# Patient Record
Sex: FEMALE | Race: WHITE | NOT HISPANIC OR LATINO | Employment: OTHER | URBAN - METROPOLITAN AREA
[De-identification: names, ages, dates, MRNs, and addresses within clinical notes are randomized per-mention and may not be internally consistent; named-entity substitution may affect disease eponyms.]

---

## 2024-04-17 ENCOUNTER — HOSPITAL ENCOUNTER (INPATIENT)
Facility: HOSPITAL | Age: 89
LOS: 2 days | Discharge: STILL ADMITTED OR ADMINISTRATIVE | DRG: 242 | End: 2024-04-19
Attending: EMERGENCY MEDICINE | Admitting: INTERNAL MEDICINE
Payer: MEDICARE

## 2024-04-17 ENCOUNTER — APPOINTMENT (OUTPATIENT)
Dept: CARDIOLOGY | Facility: HOSPITAL | Age: 89
DRG: 242 | End: 2024-04-17
Payer: MEDICARE

## 2024-04-17 ENCOUNTER — APPOINTMENT (OUTPATIENT)
Dept: RADIOLOGY | Facility: HOSPITAL | Age: 89
DRG: 242 | End: 2024-04-17
Payer: MEDICARE

## 2024-04-17 DIAGNOSIS — I38 SYSTOLIC CONGESTIVE HEART FAILURE DUE TO VALVULAR DISEASE (MULTI): ICD-10-CM

## 2024-04-17 DIAGNOSIS — I50.9 ACUTE CONGESTIVE HEART FAILURE, UNSPECIFIED HEART FAILURE TYPE (MULTI): ICD-10-CM

## 2024-04-17 DIAGNOSIS — I48.91 ATRIAL FIBRILLATION WITH SLOW VENTRICULAR RESPONSE (MULTI): ICD-10-CM

## 2024-04-17 DIAGNOSIS — I50.20 SYSTOLIC CONGESTIVE HEART FAILURE DUE TO VALVULAR DISEASE (MULTI): ICD-10-CM

## 2024-04-17 DIAGNOSIS — R00.1 BRADYCARDIA: Primary | ICD-10-CM

## 2024-04-17 DIAGNOSIS — I10 HYPERTENSION, UNSPECIFIED TYPE: ICD-10-CM

## 2024-04-17 LAB
ALBUMIN SERPL-MCNC: 4 G/DL (ref 3.5–5)
ALP BLD-CCNC: 80 U/L (ref 35–125)
ALT SERPL-CCNC: 20 U/L (ref 5–40)
ANION GAP SERPL CALC-SCNC: 10 MMOL/L
AORTIC VALVE MEAN GRADIENT: 5 MMHG
AORTIC VALVE PEAK VELOCITY: 1.62 M/S
APPEARANCE UR: CLEAR
APTT PPP: 34.2 SECONDS (ref 22–32.5)
AST SERPL-CCNC: 29 U/L (ref 5–40)
AV PEAK GRADIENT: 10.5 MMHG
AVA (PEAK VEL): 1.98 CM2
AVA (VTI): 2.19 CM2
BACTERIA #/AREA URNS AUTO: ABNORMAL /HPF
BASOPHILS # BLD AUTO: 0.05 X10*3/UL (ref 0–0.1)
BASOPHILS NFR BLD AUTO: 0.6 %
BILIRUB DIRECT SERPL-MCNC: 0.3 MG/DL (ref 0–0.2)
BILIRUB SERPL-MCNC: 0.9 MG/DL (ref 0.1–1.2)
BILIRUB UR STRIP.AUTO-MCNC: NEGATIVE MG/DL
BUN SERPL-MCNC: 26 MG/DL (ref 8–25)
CALCIUM SERPL-MCNC: 9.3 MG/DL (ref 8.5–10.4)
CHLORIDE SERPL-SCNC: 106 MMOL/L (ref 97–107)
CO2 SERPL-SCNC: 25 MMOL/L (ref 24–31)
COLOR UR: ABNORMAL
CREAT SERPL-MCNC: 1.1 MG/DL (ref 0.4–1.6)
EGFRCR SERPLBLD CKD-EPI 2021: 47 ML/MIN/1.73M*2
EJECTION FRACTION APICAL 4 CHAMBER: 56.2
EOSINOPHIL # BLD AUTO: 0.18 X10*3/UL (ref 0–0.4)
EOSINOPHIL NFR BLD AUTO: 2.2 %
ERYTHROCYTE [DISTWIDTH] IN BLOOD BY AUTOMATED COUNT: 14.6 % (ref 11.5–14.5)
GLUCOSE BLD MANUAL STRIP-MCNC: 114 MG/DL (ref 74–99)
GLUCOSE BLD MANUAL STRIP-MCNC: 119 MG/DL (ref 74–99)
GLUCOSE BLD MANUAL STRIP-MCNC: 97 MG/DL (ref 74–99)
GLUCOSE SERPL-MCNC: 103 MG/DL (ref 65–99)
GLUCOSE UR STRIP.AUTO-MCNC: NORMAL MG/DL
HCT VFR BLD AUTO: 37.9 % (ref 36–46)
HGB BLD-MCNC: 12.4 G/DL (ref 12–16)
IMM GRANULOCYTES # BLD AUTO: 0.02 X10*3/UL (ref 0–0.5)
IMM GRANULOCYTES NFR BLD AUTO: 0.2 % (ref 0–0.9)
INR PPP: 2.2 (ref 0.9–1.2)
KETONES UR STRIP.AUTO-MCNC: NEGATIVE MG/DL
LEFT ATRIUM VOLUME AREA LENGTH INDEX BSA: 100.1 ML/M2
LEFT VENTRICLE INTERNAL DIMENSION DIASTOLE: 5.67 CM (ref 3.5–6)
LEFT VENTRICULAR OUTFLOW TRACT DIAMETER: 2 CM
LEUKOCYTE ESTERASE UR QL STRIP.AUTO: ABNORMAL
LYMPHOCYTES # BLD AUTO: 2.58 X10*3/UL (ref 0.8–3)
LYMPHOCYTES NFR BLD AUTO: 31.8 %
MAGNESIUM SERPL-MCNC: 1.7 MG/DL (ref 1.6–3.1)
MCH RBC QN AUTO: 31.6 PG (ref 26–34)
MCHC RBC AUTO-ENTMCNC: 32.7 G/DL (ref 32–36)
MCV RBC AUTO: 97 FL (ref 80–100)
MONOCYTES # BLD AUTO: 0.71 X10*3/UL (ref 0.05–0.8)
MONOCYTES NFR BLD AUTO: 8.8 %
MUCOUS THREADS #/AREA URNS AUTO: ABNORMAL /LPF
NEUTROPHILS # BLD AUTO: 4.57 X10*3/UL (ref 1.6–5.5)
NEUTROPHILS NFR BLD AUTO: 56.4 %
NITRITE UR QL STRIP.AUTO: NEGATIVE
NRBC BLD-RTO: 0 /100 WBCS (ref 0–0)
NT-PROBNP SERPL-MCNC: 2806 PG/ML (ref 0–624)
NT-PROBNP SERPL-MCNC: 2813 PG/ML (ref 0–624)
PH UR STRIP.AUTO: 6 [PH]
PLATELET # BLD AUTO: 186 X10*3/UL (ref 150–450)
POTASSIUM SERPL-SCNC: 3.9 MMOL/L (ref 3.4–5.1)
PROT SERPL-MCNC: 7.3 G/DL (ref 5.9–7.9)
PROT UR STRIP.AUTO-MCNC: ABNORMAL MG/DL
PROTHROMBIN TIME: 21.8 SECONDS (ref 9.3–12.7)
RBC # BLD AUTO: 3.92 X10*6/UL (ref 4–5.2)
RBC # UR STRIP.AUTO: NEGATIVE /UL
RBC #/AREA URNS AUTO: ABNORMAL /HPF
RIGHT VENTRICLE FREE WALL PEAK S': 7.18 CM/S
RIGHT VENTRICLE PEAK SYSTOLIC PRESSURE: 54.6 MMHG
SODIUM SERPL-SCNC: 141 MMOL/L (ref 133–145)
SP GR UR STRIP.AUTO: 1.01
SQUAMOUS #/AREA URNS AUTO: ABNORMAL /HPF
TRICUSPID ANNULAR PLANE SYSTOLIC EXCURSION: 2.1 CM
TROPONIN T SERPL-MCNC: 24 NG/L
TROPONIN T SERPL-MCNC: 24 NG/L
TROPONIN T SERPL-MCNC: 29 NG/L
TSH SERPL DL<=0.05 MIU/L-ACNC: 1.57 MIU/L (ref 0.27–4.2)
UROBILINOGEN UR STRIP.AUTO-MCNC: NORMAL MG/DL
WBC # BLD AUTO: 8.1 X10*3/UL (ref 4.4–11.3)
WBC #/AREA URNS AUTO: ABNORMAL /HPF

## 2024-04-17 PROCEDURE — 36415 COLL VENOUS BLD VENIPUNCTURE: CPT | Performed by: INTERNAL MEDICINE

## 2024-04-17 PROCEDURE — 2500000001 HC RX 250 WO HCPCS SELF ADMINISTERED DRUGS (ALT 637 FOR MEDICARE OP): Performed by: EMERGENCY MEDICINE

## 2024-04-17 PROCEDURE — 71045 X-RAY EXAM CHEST 1 VIEW: CPT

## 2024-04-17 PROCEDURE — 81001 URINALYSIS AUTO W/SCOPE: CPT | Performed by: EMERGENCY MEDICINE

## 2024-04-17 PROCEDURE — 2500000004 HC RX 250 GENERAL PHARMACY W/ HCPCS (ALT 636 FOR OP/ED): Performed by: EMERGENCY MEDICINE

## 2024-04-17 PROCEDURE — 99291 CRITICAL CARE FIRST HOUR: CPT

## 2024-04-17 PROCEDURE — 96374 THER/PROPH/DIAG INJ IV PUSH: CPT

## 2024-04-17 PROCEDURE — 83735 ASSAY OF MAGNESIUM: CPT | Performed by: EMERGENCY MEDICINE

## 2024-04-17 PROCEDURE — 2500000006 HC RX 250 W HCPCS SELF ADMINISTERED DRUGS (ALT 637 FOR ALL PAYERS): Performed by: INTERNAL MEDICINE

## 2024-04-17 PROCEDURE — 2500000006 HC RX 250 W HCPCS SELF ADMINISTERED DRUGS (ALT 637 FOR ALL PAYERS): Mod: MUE | Performed by: INTERNAL MEDICINE

## 2024-04-17 PROCEDURE — 93005 ELECTROCARDIOGRAM TRACING: CPT

## 2024-04-17 PROCEDURE — 85025 COMPLETE CBC W/AUTO DIFF WBC: CPT | Performed by: EMERGENCY MEDICINE

## 2024-04-17 PROCEDURE — 87086 URINE CULTURE/COLONY COUNT: CPT | Mod: TRILAB,WESLAB,91 | Performed by: INTERNAL MEDICINE

## 2024-04-17 PROCEDURE — 2500000001 HC RX 250 WO HCPCS SELF ADMINISTERED DRUGS (ALT 637 FOR MEDICARE OP): Performed by: INTERNAL MEDICINE

## 2024-04-17 PROCEDURE — 36415 COLL VENOUS BLD VENIPUNCTURE: CPT | Performed by: EMERGENCY MEDICINE

## 2024-04-17 PROCEDURE — 2500000004 HC RX 250 GENERAL PHARMACY W/ HCPCS (ALT 636 FOR OP/ED): Performed by: INTERNAL MEDICINE

## 2024-04-17 PROCEDURE — 2500000001 HC RX 250 WO HCPCS SELF ADMINISTERED DRUGS (ALT 637 FOR MEDICARE OP)

## 2024-04-17 PROCEDURE — 84484 ASSAY OF TROPONIN QUANT: CPT | Performed by: EMERGENCY MEDICINE

## 2024-04-17 PROCEDURE — 83880 ASSAY OF NATRIURETIC PEPTIDE: CPT | Mod: 91 | Performed by: INTERNAL MEDICINE

## 2024-04-17 PROCEDURE — 80053 COMPREHEN METABOLIC PANEL: CPT | Performed by: EMERGENCY MEDICINE

## 2024-04-17 PROCEDURE — 83880 ASSAY OF NATRIURETIC PEPTIDE: CPT | Performed by: EMERGENCY MEDICINE

## 2024-04-17 PROCEDURE — 87086 URINE CULTURE/COLONY COUNT: CPT | Mod: TRILAB,WESLAB | Performed by: EMERGENCY MEDICINE

## 2024-04-17 PROCEDURE — 85610 PROTHROMBIN TIME: CPT | Performed by: EMERGENCY MEDICINE

## 2024-04-17 PROCEDURE — 82248 BILIRUBIN DIRECT: CPT | Performed by: EMERGENCY MEDICINE

## 2024-04-17 PROCEDURE — 85730 THROMBOPLASTIN TIME PARTIAL: CPT | Performed by: EMERGENCY MEDICINE

## 2024-04-17 PROCEDURE — 2060000001 HC INTERMEDIATE ICU ROOM DAILY

## 2024-04-17 PROCEDURE — 96375 TX/PRO/DX INJ NEW DRUG ADDON: CPT

## 2024-04-17 PROCEDURE — 93306 TTE W/DOPPLER COMPLETE: CPT

## 2024-04-17 PROCEDURE — 84443 ASSAY THYROID STIM HORMONE: CPT | Performed by: EMERGENCY MEDICINE

## 2024-04-17 PROCEDURE — 82947 ASSAY GLUCOSE BLOOD QUANT: CPT | Mod: 91

## 2024-04-17 PROCEDURE — 71045 X-RAY EXAM CHEST 1 VIEW: CPT | Performed by: STUDENT IN AN ORGANIZED HEALTH CARE EDUCATION/TRAINING PROGRAM

## 2024-04-17 PROCEDURE — 97162 PT EVAL MOD COMPLEX 30 MIN: CPT | Mod: GP

## 2024-04-17 PROCEDURE — 97165 OT EVAL LOW COMPLEX 30 MIN: CPT | Mod: GO

## 2024-04-17 RX ORDER — LOSARTAN POTASSIUM 50 MG/1
50 TABLET ORAL ONCE
Status: COMPLETED | OUTPATIENT
Start: 2024-04-17 | End: 2024-04-17

## 2024-04-17 RX ORDER — METFORMIN HYDROCHLORIDE 500 MG/1
500 TABLET ORAL
COMMUNITY
End: 2024-04-19 | Stop reason: HOSPADM

## 2024-04-17 RX ORDER — CEFTRIAXONE 1 G/50ML
1 INJECTION, SOLUTION INTRAVENOUS EVERY 24 HOURS
Status: DISCONTINUED | OUTPATIENT
Start: 2024-04-17 | End: 2024-04-19 | Stop reason: HOSPADM

## 2024-04-17 RX ORDER — FERROUS SULFATE, DRIED 160(50) MG
1 TABLET, EXTENDED RELEASE ORAL DAILY
Status: DISCONTINUED | OUTPATIENT
Start: 2024-04-17 | End: 2024-04-18

## 2024-04-17 RX ORDER — ALLOPURINOL 100 MG/1
100 TABLET ORAL DAILY
COMMUNITY

## 2024-04-17 RX ORDER — METOPROLOL SUCCINATE 100 MG/1
100 TABLET, EXTENDED RELEASE ORAL DAILY
COMMUNITY
End: 2024-04-19 | Stop reason: HOSPADM

## 2024-04-17 RX ORDER — ACETAMINOPHEN 325 MG/1
650 TABLET ORAL EVERY 4 HOURS PRN
Status: DISCONTINUED | OUTPATIENT
Start: 2024-04-17 | End: 2024-04-19 | Stop reason: HOSPADM

## 2024-04-17 RX ORDER — TALC
3 POWDER (GRAM) TOPICAL NIGHTLY PRN
Status: DISCONTINUED | OUTPATIENT
Start: 2024-04-17 | End: 2024-04-19 | Stop reason: HOSPADM

## 2024-04-17 RX ORDER — PANTOPRAZOLE SODIUM 40 MG/10ML
40 INJECTION, POWDER, LYOPHILIZED, FOR SOLUTION INTRAVENOUS
Status: DISCONTINUED | OUTPATIENT
Start: 2024-04-17 | End: 2024-04-19 | Stop reason: HOSPADM

## 2024-04-17 RX ORDER — POLYETHYLENE GLYCOL 3350 17 G/17G
17 POWDER, FOR SOLUTION ORAL DAILY
Status: DISCONTINUED | OUTPATIENT
Start: 2024-04-17 | End: 2024-04-18

## 2024-04-17 RX ORDER — WARFARIN SODIUM 5 MG/1
5 TABLET ORAL DAILY
Status: DISCONTINUED | OUTPATIENT
Start: 2024-04-17 | End: 2024-04-19 | Stop reason: HOSPADM

## 2024-04-17 RX ORDER — ACETAMINOPHEN 160 MG/5ML
650 SOLUTION ORAL EVERY 4 HOURS PRN
Status: DISCONTINUED | OUTPATIENT
Start: 2024-04-17 | End: 2024-04-19 | Stop reason: HOSPADM

## 2024-04-17 RX ORDER — DEXTROSE 50 % IN WATER (D50W) INTRAVENOUS SYRINGE
12.5
Status: DISCONTINUED | OUTPATIENT
Start: 2024-04-17 | End: 2024-04-19 | Stop reason: HOSPADM

## 2024-04-17 RX ORDER — ALLOPURINOL 100 MG/1
100 TABLET ORAL DAILY
Status: DISCONTINUED | OUTPATIENT
Start: 2024-04-17 | End: 2024-04-17

## 2024-04-17 RX ORDER — WARFARIN SODIUM 5 MG/1
5 TABLET ORAL
COMMUNITY
End: 2024-04-19 | Stop reason: HOSPADM

## 2024-04-17 RX ORDER — DEXTROSE 50 % IN WATER (D50W) INTRAVENOUS SYRINGE
25
Status: DISCONTINUED | OUTPATIENT
Start: 2024-04-17 | End: 2024-04-19 | Stop reason: HOSPADM

## 2024-04-17 RX ORDER — PANTOPRAZOLE SODIUM 40 MG/1
40 TABLET, DELAYED RELEASE ORAL
Status: DISCONTINUED | OUTPATIENT
Start: 2024-04-17 | End: 2024-04-19 | Stop reason: HOSPADM

## 2024-04-17 RX ORDER — INSULIN LISPRO 100 [IU]/ML
0-10 INJECTION, SOLUTION INTRAVENOUS; SUBCUTANEOUS
Status: DISCONTINUED | OUTPATIENT
Start: 2024-04-17 | End: 2024-04-19 | Stop reason: HOSPADM

## 2024-04-17 RX ORDER — FERROUS SULFATE, DRIED 160(50) MG
1 TABLET, EXTENDED RELEASE ORAL DAILY
COMMUNITY

## 2024-04-17 RX ORDER — POTASSIUM CHLORIDE 20 MEQ/1
20 TABLET, EXTENDED RELEASE ORAL ONCE
Status: COMPLETED | OUTPATIENT
Start: 2024-04-17 | End: 2024-04-17

## 2024-04-17 RX ORDER — ATROPINE SULFATE 0.1 MG/ML
0.5 INJECTION INTRAVENOUS ONCE
Status: DISCONTINUED | OUTPATIENT
Start: 2024-04-17 | End: 2024-04-19 | Stop reason: HOSPADM

## 2024-04-17 RX ORDER — ALLOPURINOL 100 MG/1
100 TABLET ORAL DAILY
Status: DISCONTINUED | OUTPATIENT
Start: 2024-04-17 | End: 2024-04-19 | Stop reason: HOSPADM

## 2024-04-17 RX ORDER — FUROSEMIDE 10 MG/ML
20 INJECTION INTRAMUSCULAR; INTRAVENOUS ONCE
Status: COMPLETED | OUTPATIENT
Start: 2024-04-17 | End: 2024-04-17

## 2024-04-17 RX ORDER — ATROPINE SULFATE 0.4 MG/ML
1 INJECTION, SOLUTION ENDOTRACHEAL; INTRAMEDULLARY; INTRAMUSCULAR; INTRAVENOUS; SUBCUTANEOUS ONCE
Status: COMPLETED | OUTPATIENT
Start: 2024-04-17 | End: 2024-04-17

## 2024-04-17 RX ORDER — LOSARTAN POTASSIUM AND HYDROCHLOROTHIAZIDE 12.5; 5 MG/1; MG/1
1 TABLET ORAL DAILY
COMMUNITY
End: 2024-04-19 | Stop reason: HOSPADM

## 2024-04-17 RX ORDER — ACETAMINOPHEN 650 MG/1
650 SUPPOSITORY RECTAL EVERY 4 HOURS PRN
Status: DISCONTINUED | OUTPATIENT
Start: 2024-04-17 | End: 2024-04-19 | Stop reason: HOSPADM

## 2024-04-17 RX ORDER — ACETAMINOPHEN 325 MG/1
975 TABLET ORAL ONCE
Status: COMPLETED | OUTPATIENT
Start: 2024-04-17 | End: 2024-04-17

## 2024-04-17 RX ORDER — LOSARTAN POTASSIUM 50 MG/1
50 TABLET ORAL DAILY
Status: DISCONTINUED | OUTPATIENT
Start: 2024-04-18 | End: 2024-04-19 | Stop reason: HOSPADM

## 2024-04-17 RX ORDER — MAGNESIUM SULFATE HEPTAHYDRATE 40 MG/ML
2 INJECTION, SOLUTION INTRAVENOUS ONCE
Status: COMPLETED | OUTPATIENT
Start: 2024-04-17 | End: 2024-04-17

## 2024-04-17 RX ORDER — FUROSEMIDE 20 MG/1
20 TABLET ORAL ONCE
Status: COMPLETED | OUTPATIENT
Start: 2024-04-17 | End: 2024-04-17

## 2024-04-17 RX ORDER — LOSARTAN POTASSIUM AND HYDROCHLOROTHIAZIDE 12.5; 1 MG/1; MG/1
1 TABLET ORAL DAILY
COMMUNITY
End: 2024-04-19 | Stop reason: HOSPADM

## 2024-04-17 RX ORDER — DOPAMINE HYDROCHLORIDE 160 MG/100ML
1 INJECTION, SOLUTION INTRAVENOUS CONTINUOUS
Status: DISCONTINUED | OUTPATIENT
Start: 2024-04-18 | End: 2024-04-19

## 2024-04-17 RX ADMIN — LOSARTAN POTASSIUM 50 MG: 50 TABLET, FILM COATED ORAL at 07:06

## 2024-04-17 RX ADMIN — Medication 3 MG: at 21:28

## 2024-04-17 RX ADMIN — FUROSEMIDE 20 MG: 20 TABLET ORAL at 21:27

## 2024-04-17 RX ADMIN — CEFTRIAXONE SODIUM 1 G: 1 INJECTION, SOLUTION INTRAVENOUS at 14:18

## 2024-04-17 RX ADMIN — POTASSIUM CHLORIDE 20 MEQ: 1500 TABLET, EXTENDED RELEASE ORAL at 21:27

## 2024-04-17 RX ADMIN — WARFARIN SODIUM 5 MG: 5 TABLET ORAL at 21:28

## 2024-04-17 RX ADMIN — ATROPINE SULFATE 1 MG: 0.4 INJECTION INTRAMUSCULAR; INTRAVENOUS; SUBCUTANEOUS at 07:00

## 2024-04-17 RX ADMIN — Medication 1 TABLET: at 10:57

## 2024-04-17 RX ADMIN — FUROSEMIDE 20 MG: 10 INJECTION, SOLUTION INTRAMUSCULAR; INTRAVENOUS at 07:18

## 2024-04-17 RX ADMIN — ACETAMINOPHEN 975 MG: 325 TABLET ORAL at 07:57

## 2024-04-17 RX ADMIN — LOSARTAN POTASSIUM: 100 TABLET, FILM COATED ORAL at 10:56

## 2024-04-17 RX ADMIN — MAGNESIUM SULFATE HEPTAHYDRATE 2 G: 40 INJECTION, SOLUTION INTRAVENOUS at 21:45

## 2024-04-17 RX ADMIN — ACETAMINOPHEN 650 MG: 325 TABLET ORAL at 12:35

## 2024-04-17 RX ADMIN — ALLOPURINOL 100 MG: 100 TABLET ORAL at 21:28

## 2024-04-17 RX ADMIN — DOPAMINE HYDROCHLORIDE 2 MCG/KG/MIN: 160 INJECTION, SOLUTION INTRAVENOUS at 23:53

## 2024-04-17 SDOH — ECONOMIC STABILITY: INCOME INSECURITY: IN THE LAST 12 MONTHS, WAS THERE A TIME WHEN YOU WERE NOT ABLE TO PAY THE MORTGAGE OR RENT ON TIME?: NO

## 2024-04-17 SDOH — SOCIAL STABILITY: SOCIAL NETWORK: HOW OFTEN DO YOU GET TOGETHER WITH FRIENDS OR RELATIVES?: MORE THAN THREE TIMES A WEEK

## 2024-04-17 SDOH — SOCIAL STABILITY: SOCIAL NETWORK: HOW OFTEN DO YOU ATTEND CHURCH OR RELIGIOUS SERVICES?: NEVER

## 2024-04-17 SDOH — SOCIAL STABILITY: SOCIAL INSECURITY: HAVE YOU HAD ANY THOUGHTS OF HARMING ANYONE ELSE?: NO

## 2024-04-17 SDOH — SOCIAL STABILITY: SOCIAL INSECURITY: WERE YOU ABLE TO COMPLETE ALL THE BEHAVIORAL HEALTH SCREENINGS?: YES

## 2024-04-17 SDOH — SOCIAL STABILITY: SOCIAL INSECURITY: WITHIN THE LAST YEAR, HAVE YOU BEEN HUMILIATED OR EMOTIONALLY ABUSED IN OTHER WAYS BY YOUR PARTNER OR EX-PARTNER?: NO

## 2024-04-17 SDOH — HEALTH STABILITY: MENTAL HEALTH: HOW OFTEN DO YOU HAVE A DRINK CONTAINING ALCOHOL?: NEVER

## 2024-04-17 SDOH — SOCIAL STABILITY: SOCIAL INSECURITY
WITHIN THE LAST YEAR, HAVE TO BEEN RAPED OR FORCED TO HAVE ANY KIND OF SEXUAL ACTIVITY BY YOUR PARTNER OR EX-PARTNER?: NO

## 2024-04-17 SDOH — ECONOMIC STABILITY: FOOD INSECURITY: WITHIN THE PAST 12 MONTHS, THE FOOD YOU BOUGHT JUST DIDN'T LAST AND YOU DIDN'T HAVE MONEY TO GET MORE.: NEVER TRUE

## 2024-04-17 SDOH — SOCIAL STABILITY: SOCIAL NETWORK
DO YOU BELONG TO ANY CLUBS OR ORGANIZATIONS SUCH AS CHURCH GROUPS UNIONS, FRATERNAL OR ATHLETIC GROUPS, OR SCHOOL GROUPS?: YES

## 2024-04-17 SDOH — ECONOMIC STABILITY: INCOME INSECURITY: HOW HARD IS IT FOR YOU TO PAY FOR THE VERY BASICS LIKE FOOD, HOUSING, MEDICAL CARE, AND HEATING?: NOT HARD AT ALL

## 2024-04-17 SDOH — HEALTH STABILITY: MENTAL HEALTH: HOW OFTEN DO YOU HAVE 6 OR MORE DRINKS ON ONE OCCASION?: NEVER

## 2024-04-17 SDOH — ECONOMIC STABILITY: HOUSING INSECURITY
IN THE LAST 12 MONTHS, WAS THERE A TIME WHEN YOU DID NOT HAVE A STEADY PLACE TO SLEEP OR SLEPT IN A SHELTER (INCLUDING NOW)?: NO

## 2024-04-17 SDOH — ECONOMIC STABILITY: FOOD INSECURITY: WITHIN THE PAST 12 MONTHS, YOU WORRIED THAT YOUR FOOD WOULD RUN OUT BEFORE YOU GOT MONEY TO BUY MORE.: NEVER TRUE

## 2024-04-17 SDOH — ECONOMIC STABILITY: TRANSPORTATION INSECURITY
IN THE PAST 12 MONTHS, HAS LACK OF TRANSPORTATION KEPT YOU FROM MEETINGS, WORK, OR FROM GETTING THINGS NEEDED FOR DAILY LIVING?: NO

## 2024-04-17 SDOH — SOCIAL STABILITY: SOCIAL INSECURITY: HAS ANYONE EVER THREATENED TO HURT YOUR FAMILY OR YOUR PETS?: NO

## 2024-04-17 SDOH — SOCIAL STABILITY: SOCIAL INSECURITY: DO YOU FEEL ANYONE HAS EXPLOITED OR TAKEN ADVANTAGE OF YOU FINANCIALLY OR OF YOUR PERSONAL PROPERTY?: NO

## 2024-04-17 SDOH — HEALTH STABILITY: PHYSICAL HEALTH: ON AVERAGE, HOW MANY DAYS PER WEEK DO YOU ENGAGE IN MODERATE TO STRENUOUS EXERCISE (LIKE A BRISK WALK)?: 7 DAYS

## 2024-04-17 SDOH — HEALTH STABILITY: MENTAL HEALTH
STRESS IS WHEN SOMEONE FEELS TENSE, NERVOUS, ANXIOUS, OR CAN'T SLEEP AT NIGHT BECAUSE THEIR MIND IS TROUBLED. HOW STRESSED ARE YOU?: NOT AT ALL

## 2024-04-17 SDOH — SOCIAL STABILITY: SOCIAL INSECURITY: WITHIN THE LAST YEAR, HAVE YOU BEEN AFRAID OF YOUR PARTNER OR EX-PARTNER?: NO

## 2024-04-17 SDOH — ECONOMIC STABILITY: INCOME INSECURITY: IN THE PAST 12 MONTHS, HAS THE ELECTRIC, GAS, OIL, OR WATER COMPANY THREATENED TO SHUT OFF SERVICE IN YOUR HOME?: NO

## 2024-04-17 SDOH — SOCIAL STABILITY: SOCIAL INSECURITY: HAVE YOU HAD THOUGHTS OF HARMING ANYONE ELSE?: NO

## 2024-04-17 SDOH — HEALTH STABILITY: MENTAL HEALTH
HOW OFTEN DO YOU NEED TO HAVE SOMEONE HELP YOU WHEN YOU READ INSTRUCTIONS, PAMPHLETS, OR OTHER WRITTEN MATERIAL FROM YOUR DOCTOR OR PHARMACY?: NEVER

## 2024-04-17 SDOH — HEALTH STABILITY: MENTAL HEALTH: HOW MANY STANDARD DRINKS CONTAINING ALCOHOL DO YOU HAVE ON A TYPICAL DAY?: PATIENT DOES NOT DRINK

## 2024-04-17 SDOH — SOCIAL STABILITY: SOCIAL NETWORK: HOW OFTEN DO YOU ATTENT MEETINGS OF THE CLUB OR ORGANIZATION YOU BELONG TO?: MORE THAN 4 TIMES PER YEAR

## 2024-04-17 SDOH — SOCIAL STABILITY: SOCIAL NETWORK
IN A TYPICAL WEEK, HOW MANY TIMES DO YOU TALK ON THE PHONE WITH FAMILY, FRIENDS, OR NEIGHBORS?: MORE THAN THREE TIMES A WEEK

## 2024-04-17 SDOH — SOCIAL STABILITY: SOCIAL INSECURITY: DO YOU FEEL UNSAFE GOING BACK TO THE PLACE WHERE YOU ARE LIVING?: NO

## 2024-04-17 SDOH — SOCIAL STABILITY: SOCIAL INSECURITY
WITHIN THE LAST YEAR, HAVE YOU BEEN KICKED, HIT, SLAPPED, OR OTHERWISE PHYSICALLY HURT BY YOUR PARTNER OR EX-PARTNER?: NO

## 2024-04-17 SDOH — SOCIAL STABILITY: SOCIAL NETWORK: ARE YOU MARRIED, WIDOWED, DIVORCED, SEPARATED, NEVER MARRIED, OR LIVING WITH A PARTNER?: WIDOWED

## 2024-04-17 SDOH — SOCIAL STABILITY: SOCIAL INSECURITY: ABUSE: ADULT

## 2024-04-17 SDOH — SOCIAL STABILITY: SOCIAL INSECURITY: ARE THERE ANY APPARENT SIGNS OF INJURIES/BEHAVIORS THAT COULD BE RELATED TO ABUSE/NEGLECT?: NO

## 2024-04-17 SDOH — ECONOMIC STABILITY: TRANSPORTATION INSECURITY
IN THE PAST 12 MONTHS, HAS THE LACK OF TRANSPORTATION KEPT YOU FROM MEDICAL APPOINTMENTS OR FROM GETTING MEDICATIONS?: NO

## 2024-04-17 SDOH — SOCIAL STABILITY: SOCIAL INSECURITY: ARE YOU OR HAVE YOU BEEN THREATENED OR ABUSED PHYSICALLY, EMOTIONALLY, OR SEXUALLY BY ANYONE?: NO

## 2024-04-17 SDOH — SOCIAL STABILITY: SOCIAL INSECURITY: DOES ANYONE TRY TO KEEP YOU FROM HAVING/CONTACTING OTHER FRIENDS OR DOING THINGS OUTSIDE YOUR HOME?: NO

## 2024-04-17 ASSESSMENT — PAIN - FUNCTIONAL ASSESSMENT
PAIN_FUNCTIONAL_ASSESSMENT: 0-10

## 2024-04-17 ASSESSMENT — COGNITIVE AND FUNCTIONAL STATUS - GENERAL
TOILETING: A LOT
CLIMB 3 TO 5 STEPS WITH RAILING: A LITTLE
STANDING UP FROM CHAIR USING ARMS: A LITTLE
DRESSING REGULAR LOWER BODY CLOTHING: A LOT
MOVING TO AND FROM BED TO CHAIR: TOTAL
EATING MEALS: A LITTLE
TOILETING: A LOT
DRESSING REGULAR LOWER BODY CLOTHING: A LOT
MOBILITY SCORE: 10
DRESSING REGULAR UPPER BODY CLOTHING: A LITTLE
STANDING UP FROM CHAIR USING ARMS: TOTAL
MOVING FROM LYING ON BACK TO SITTING ON SIDE OF FLAT BED WITH BEDRAILS: A LITTLE
DAILY ACTIVITIY SCORE: 24
WALKING IN HOSPITAL ROOM: TOTAL
TURNING FROM BACK TO SIDE WHILE IN FLAT BAD: A LITTLE
EATING MEALS: A LITTLE
WALKING IN HOSPITAL ROOM: TOTAL
CLIMB 3 TO 5 STEPS WITH RAILING: TOTAL
PATIENT BASELINE BEDBOUND: NO
WALKING IN HOSPITAL ROOM: A LITTLE
TURNING FROM BACK TO SIDE WHILE IN FLAT BAD: A LITTLE
DAILY ACTIVITIY SCORE: 15
CLIMB 3 TO 5 STEPS WITH RAILING: TOTAL
MOVING TO AND FROM BED TO CHAIR: TOTAL
DAILY ACTIVITIY SCORE: 15
MOBILITY SCORE: 21
PERSONAL GROOMING: A LITTLE
PERSONAL GROOMING: A LITTLE
DRESSING REGULAR UPPER BODY CLOTHING: A LITTLE
MOBILITY SCORE: 10
STANDING UP FROM CHAIR USING ARMS: TOTAL
MOVING FROM LYING ON BACK TO SITTING ON SIDE OF FLAT BED WITH BEDRAILS: A LITTLE
HELP NEEDED FOR BATHING: A LOT
HELP NEEDED FOR BATHING: A LOT

## 2024-04-17 ASSESSMENT — COLUMBIA-SUICIDE SEVERITY RATING SCALE - C-SSRS
6. HAVE YOU EVER DONE ANYTHING, STARTED TO DO ANYTHING, OR PREPARED TO DO ANYTHING TO END YOUR LIFE?: NO
2. HAVE YOU ACTUALLY HAD ANY THOUGHTS OF KILLING YOURSELF?: NO
1. IN THE PAST MONTH, HAVE YOU WISHED YOU WERE DEAD OR WISHED YOU COULD GO TO SLEEP AND NOT WAKE UP?: NO

## 2024-04-17 ASSESSMENT — PAIN SCALES - GENERAL
PAINLEVEL_OUTOF10: 0 - NO PAIN
PAINLEVEL_OUTOF10: 0 - NO PAIN
PAINLEVEL_OUTOF10: 2
PAINLEVEL_OUTOF10: 3
PAINLEVEL_OUTOF10: 0 - NO PAIN
PAINLEVEL_OUTOF10: 0 - NO PAIN

## 2024-04-17 ASSESSMENT — ENCOUNTER SYMPTOMS
RESPIRATORY NEGATIVE: 1
GASTROINTESTINAL NEGATIVE: 1
CARDIOVASCULAR NEGATIVE: 1
ALLERGIC/IMMUNOLOGIC NEGATIVE: 1
MUSCULOSKELETAL NEGATIVE: 1
ENDOCRINE NEGATIVE: 1
NEUROLOGICAL NEGATIVE: 1
EYES NEGATIVE: 1
CONSTITUTIONAL NEGATIVE: 1
PSYCHIATRIC NEGATIVE: 1
HEMATOLOGIC/LYMPHATIC NEGATIVE: 1

## 2024-04-17 ASSESSMENT — PAIN DESCRIPTION - LOCATION
LOCATION: BACK
LOCATION: BACK

## 2024-04-17 ASSESSMENT — PAIN DESCRIPTION - PAIN TYPE: TYPE: ACUTE PAIN

## 2024-04-17 ASSESSMENT — ACTIVITIES OF DAILY LIVING (ADL)
PATIENT'S MEMORY ADEQUATE TO SAFELY COMPLETE DAILY ACTIVITIES?: YES
DRESSING YOURSELF: INDEPENDENT
ADEQUATE_TO_COMPLETE_ADL: YES
JUDGMENT_ADEQUATE_SAFELY_COMPLETE_DAILY_ACTIVITIES: YES
GROOMING: INDEPENDENT
WALKS IN HOME: INDEPENDENT
BATHING: INDEPENDENT
LACK_OF_TRANSPORTATION: NO
ADL_ASSISTANCE: INDEPENDENT
FEEDING YOURSELF: INDEPENDENT
TOILETING: INDEPENDENT
BATHING_ASSISTANCE: MODERATE
LACK_OF_TRANSPORTATION: NO
HEARING - RIGHT EAR: HEARING AID
ADL_ASSISTANCE: INDEPENDENT
HEARING - LEFT EAR: HEARING AID

## 2024-04-17 ASSESSMENT — LIFESTYLE VARIABLES
SKIP TO QUESTIONS 9-10: 1
HOW OFTEN DO YOU HAVE 6 OR MORE DRINKS ON ONE OCCASION: NEVER
AUDIT-C TOTAL SCORE: 0
HOW OFTEN DO YOU HAVE A DRINK CONTAINING ALCOHOL: NEVER
SKIP TO QUESTIONS 9-10: 1
AUDIT-C TOTAL SCORE: 0
HOW MANY STANDARD DRINKS CONTAINING ALCOHOL DO YOU HAVE ON A TYPICAL DAY: PATIENT DOES NOT DRINK
AUDIT-C TOTAL SCORE: 0

## 2024-04-17 ASSESSMENT — PATIENT HEALTH QUESTIONNAIRE - PHQ9
2. FEELING DOWN, DEPRESSED OR HOPELESS: NOT AT ALL
SUM OF ALL RESPONSES TO PHQ9 QUESTIONS 1 & 2: 1
1. LITTLE INTEREST OR PLEASURE IN DOING THINGS: SEVERAL DAYS

## 2024-04-17 NOTE — ED PROVIDER NOTES
"HPI   Chief Complaint   Patient presents with    Illness     Patient called EMS after waking up and stating she is not feeling right. Patient family states she has had a fever over the past week and has not been feeling good        89-year-old female presents for generalized malaise and fatigue.  States that she has felt unwell over the past 1 week.  States that she feels \"agitated\" at night making it difficult for her to sleep.  States this initially started about 1 week ago and she has never had it before.  States that she only gets about 2 hours of sleep due to this and then feels tired throughout the day.  States that she would like to do things but feels \"in capable.\"  Denies any other ill symptoms such as cough, URI symptoms, abdominal pain, vomiting diarrhea.  No chest pain or shortness of breath.  Does have a history of A-fib on metoprolol and Coumadin but does not have a cardiologist.  States that she saw her PCP about 1 month ago no medication changes and everything was fine at that time.  She does report 1 week ago when symptoms started that she may have had a temperature.  She does note she has had some increased ankle swelling that is unusual for her in the mornings.      History provided by:  EMS personnel, relative and patient                      Juan Coma Scale Score: 15                     Patient History   No past medical history on file.  No past surgical history on file.  No family history on file.  Social History     Tobacco Use    Smoking status: Not on file    Smokeless tobacco: Not on file   Substance Use Topics    Alcohol use: Not on file    Drug use: Not on file       Physical Exam   ED Triage Vitals [04/17/24 0534]   Temperature Heart Rate Respirations BP   36.6 °C (97.9 °F) 70 18 (!) 177/114      Pulse Ox Temp Source Heart Rate Source Patient Position   (!) 90 % Oral Monitor Lying      BP Location FiO2 (%)     Right arm --       Physical Exam  Vitals and nursing note reviewed. "     General: Vitals reviewed. Awake, alert, well-developed, well-nourished, NAD  HEENT: NC/AT, PERRL, MMM  Neck: Supple, trachea midline  Respiratory: No respiratory distress, speaking full sentences but 90% on room air lungs clear to auscultation bilaterally, no wheezes, rhonchi, or rales  CV: Bradycardic rate and irregularly irregular rhythm, no murmur/gallop/rubs  Abdomen/GI: Soft, non-tender, non-distended, no rebound, guarding, or rigidity, normal bowel sounds  Extremities: Moving all extremities, no deformities, 1+ bilateral ankle edema, no calf tenderness or palpable cords  Neuro: AAOx3, cranial nerves intact, strength 5/5 x 4 extremities, sensation diffusely intact, no ataxia  Skin: Warm, dry. No rashes identified    ED Course & MDM   ED Course as of 04/17/24 0749   Wed Apr 17, 2024   0530 My EKG interpretation:  Atrial fibrillation 40 bpm QTc 383 no ectopy or acute ischemic changes noted [KW]   0552 I SAW THIS PATIENT VERY BRIEFLY TO ESTABLISH ACUITY AND DEVELOPED A BASIC PLAN OF CARE.  A MORE THOROUGH HISTORY AND PHYSICAL EXAM WILL BE DOCUMENTED BY THE TREATING PHYSICIAN AND OR ANGIE.    HPI:  89-year-old female fairly poor historian presents because she did not feel well.  She is not very specific as to any other complaints.  She does deny any pain.    Physical exam:  General:  Awake, alert, no acute distress.  Head: Normocephalic, Atraumatic  Neck: Supple, trachea midline, no stridor  Skin: Warm and dry, no rashes   Lungs: Clear to auscultation bilaterally no acute respiratory distress, speaking in full sentences without difficulty  CV: Bradycardic rate, irregularly irregular rhythm with no obvious murmurs gallops rubs noted, no jugular venous distention, no pedal edema   Abdomen: Soft, nontender, nondistended, positive bowel sounds, no peritoneal signs  Neuro:  No gross focal neurologic deficits, NIH is 0  Musculoskeletal:  Full range of motion in all 4 extremities  Psychiatric:  Alert oriented x 3, Good  insight into condition.    MDM:  I evaluated the patient because her heart rate was fluctuating between the 30s to 50s she is in atrial fibrillation.  Her blood pressure is stable.  Appropriate laboratory studies are ordered.  The patient was placed on the monitor and pacer pads.  At this point was not administered any medication to improve her heart rate as her blood pressure is stable and her heart rate is fluctuating.  Patient will be endorsed to the oncoming physician. [KW]   0600 EKG on my independent interpretation: Atrial fibrillation with slow ventricular response at 40 bpm, left axis deviation, other than OH normal intervals, somewhat low voltage with diffuse T wave flattening which is new compaired to prior EKG, atrial fibrillation is unchanged now with slow ventricular response [JACINTO]   0724 Consulted Dr. Begum, cardiology, who feels holding metoprolol appropriate and ok to stay here.   [JACINTO]   0726 EKG on my independent interpretation: Atrial fibrillation with slow ventricular response at 54 bpm, left axis deviation, other than OH normal intervals, nonspecific diffuse T wave flattening [JACINTO]   0737 Discussed presentation, workup, plan of care with Dr. Medel, hospitalist who accepts to his service for admission [JACINTO]      ED Course User Index  [JACINTO] Chelly Suero MD  [KW] Young Blankenship, DO         Diagnoses as of 04/17/24 0749   Atrial fibrillation with slow ventricular response (Multi)   Acute congestive heart failure, unspecified heart failure type (Multi)   Hypertension, unspecified type       Medical Decision Making  89-year-old female presents for generalized malaise and fatigue.  No other specific symptoms.  Found to be in A-fib with slow ventricular response heart rate dipping into the 30s at times suspect this may be contributory however also considered underlying etiologies such as infectious, metabolic abnormality among others.  Consider CHF due to low heart rate as patient  borderline hypoxic at 90% on room air and has mild ankle edema.  No history or physical exam findings to strongly suggest pulmonary embolism as she denies feeling short of breath or having any chest pain, no calf pain or tenderness, recent travel, surgery, or immobilization and she is fully anticoagulated on Coumadin.  Patient given dose of atropine heart rate improved to the 50s.  She is on metoprolol which may be contributory.  She has hypertensive but did not take her antihypertensive today given dose of losartan.  Labs obtained without significant abnormality.  Electrolytes normal.  BNP is elevated at 2806 and troponin mildly elevated at 24 will trend somewhat suggestive of type II demand/CHF.  Considered ACS and again will trend troponins although patient not having any chest pain or ACS equivalent symptoms.  Chest x-ray on my independent interpretation with small bilateral pleural effusions and pulmonary edema suggestive of CHF therefore patient given low-dose of IV Lasix.  Consult cardiologist will hold metoprolol and admit for further workup and management.    CRITICAL CARE NOTE:    Upon my evaluation, this patient had a high probability of imminent or life-threatening deterioration due to acute bradycardia arrhythmia, which required my direct attention, intervention, and personal management    31 total minutes of critical care were personally provided which excludes and was non concurrent with other providers and all other billable procedures.  This was for time at the bedside, re-evaluations, interpretation of lab and imaging results, discussions with consultants, and monitoring for potential decompensation.  Intervention were performed as documented above.    Amount and/or Complexity of Data Reviewed  External Data Reviewed: notes.     Details: 3/6/2024 office notes reviewed from internal medicine  Labs: ordered. Decision-making details documented in ED Course.  Radiology: ordered and independent  interpretation performed. Decision-making details documented in ED Course.  ECG/medicine tests: ordered and independent interpretation performed. Decision-making details documented in ED Course.  Discussion of management or test interpretation with external provider(s): Discussed with cardiologist, Dr. Pickett as well as hospitalist, Dr. Medel        Procedure  Procedures     hCelly Suero MD  04/17/24 0725

## 2024-04-17 NOTE — PROGRESS NOTES
Physical Therapy Evaluation    Patient Name: Audelia Virgen  MRN: [id number]  Today's Date: 4/17/2024   Time Calculation  Start Time: 0946  Stop Time: 1005  Time Calculation (min): 19 min    89 year old female admits from home with bradycardia, CHF, Malaise, DB, and HTN. Per Pt and family x2 present, is very active and ind at home prior to admission. BP is found to be 192/71 today--RN notified and further activity deferred. Strength checked at EOB only. Family+Pt present do not anticipate significant PT needs upon discharge however PT was given report by nursing staff that Pt has required assist x2 for transfers while here.  With this, PT will initiate an acute care POC to continue to follow up with patient. As medical status allows, will plan to make DC recommendations at next follow up session. For  now, DC recommendations are TBD    Assessment/Plan   PT Assessment  PT Assessment Results: Decreased strength, Decreased endurance, Decreased mobility, Impaired balance, Decreased cognition, Impaired judgement, Decreased safety awareness  Rehab Prognosis: Good  Evaluation/Treatment Tolerance: Treatment limited secondary to medical complications (Comment) (Vitals)  Medical Staff Made Aware: Yes  End of Session Communication: Bedside nurse, PCT/NA/CTA  End of Session Patient Position: Bed, 3 rail up, Alarm on  IP OR SWING BED PT PLAN  Inpatient or Swing Bed: Inpatient  PT Plan  Treatment/Interventions: Bed mobility, Transfer training, Gait training, Stair training, Balance training, Strengthening, Endurance training, Range of motion, Therapeutic exercise, Therapeutic activity, Home exercise program  PT Plan: Skilled PT  PT Frequency: 5 times per week  PT Discharge Recommendations:  (TBD)  Equipment Recommended upon Discharge:  (TBD)  PT Recommended Transfer Status:  (TBD)  PT - OK to Discharge: Yes      Subjective   General Visit Information:  General  Reason for Referral: Impaired Mobility  Referred By:   Lizy  Past Medical History Relevant to Rehab: CHF, DB, HTN  Family/Caregiver Present: Yes  Caregiver Feedback: Family x2 present  Prior to Session Communication: Bedside nurse, PCT/NA/CTA  Patient Position Received: Bed, 3 rail up, Alarm on  Preferred Learning Style: verbal  General Comment: 89 year old female admits with Bradycardia, CHF, Malaise, DB, and HTN. By nursing report, has needed assist x2 while here for transfers.  Home Living:  Home Living  Type of Home: [place]  Lives With: Alone  Home Adaptive Equipment: Cane  Home Layout: One level  Home Access: Stairs to enter with rails  Entrance Stairs-Rails: Both  Entrance Stairs-Number of Steps: 2  Bathroom Shower/Tub: Walk-in shower  Bathroom Equipment: Grab bars in shower, Built-in shower seat  Prior Level of Function:  Prior Function Per Pt/Caregiver Report  Level of [place]: Independent with ADLs and functional transfers, Independent with homemaking with ambulation  Receives Help From:  (Family very involved as needed)  ADL Assistance: Independent  Homemaking Assistance: Independent  Ambulatory Assistance: Independent  Prior Function Comments: +Drives, denies falls, + Furniture walking at home  Precautions:  Precautions  Hearing/Visual Limitations: Very [place]--has hearing aids but not in at the moment. + Glasses  Medical Precautions: Fall precautions  Vital Signs:  Vital Signs  BP: (!) 192/71  BP Location: Right arm  BP Method: Automatic  Patient Position: Lying    Objective   Pain:  Pain Assessment  Pain Assessment: 0-10  Pain Score: 0 - No pain  Cognition:  Cognition  Overall Cognitive Status: Within Functional Limits  Orientation Level: Oriented X4    General Assessments:  Activity Tolerance  Endurance: Decreased tolerance for upright activites (See vitals)    Sensation  Light Touch: No apparent deficits    Strength  Strength Comments: Grossly at least 4/5 on BLEs  Functional Assessments:  Bed Mobility  Bed Mobility: Yes  Bed Mobility 1  Bed  Mobility 1: Supine to sitting  Level of Assistance 1: Close supervision  Bed Mobility Comments 1: slow, laborous with HOB elevated but self completed  Bed Mobility 2  Bed Mobility  2: Sitting to supine  Level of Assistance 2: Close supervision  Bed Mobility Comments 2: slow, laborous with HOB elevated but self completed    Transfers  Transfer: No (Defer at this time due to vitals)    Outcome Measures:  [facility] Basic Mobility  Turning from your back to your side while in a flat bed without using bedrails: A little  Moving from lying on your back to sitting on the side of a flat bed without using bedrails: A little  Moving to and from bed to chair (including a wheelchair): Total  Standing up from a chair using your arms (e.g. wheelchair or bedside chair): Total  To walk in hospital room: Total  Climbing 3-5 steps with railing: Total  Basic Mobility - Total Score: 10    Encounter Problems       Encounter Problems (Active)       Balance       Sitting Balance (Progressing)       Start:  04/17/24    Expected End:  05/01/24       Pt will demonstrate good sitting balance to promote safe engagement with out of bed activities           Standing Balance (Progressing)       Start:  04/17/24    Expected End:  05/01/24       Pt will demonstrate good static standing balance to promote safe participation with out of bed activity, transfers, and mobility              Mobility       Ambulation (Progressing)       Start:  04/17/24    Expected End:  05/01/24       Pt will ambulate 50' independent assist with no device to promote safe home mobility           Entry Stair Negotiation (Progressing)       Start:  04/17/24    Expected End:  05/01/24       Pt will ascend/descend 2 stairs with rail(s) on B and modified independent assist to promote safe entry and exit in home environment                PT Transfers       Supine to sit (Progressing)       Start:  04/17/24    Expected End:  05/01/24       Pt will transfer supine to sitting at edge  of bed with independent assist to promote acute care out of bed activity           Sit to stand (Progressing)       Start:  04/17/24    Expected End:  05/01/24       Pt will transfer sit to standing position with independent assist and no device to promote safe out of bed activity           Bed to chair (Progressing)       Start:  04/17/24    Expected End:  05/01/24       Pt will transfer from sitting edge of bed to the chair with independent assist and no device to promote out of bed activity and reduce the risks of prolonged acute care bedrest              Pain - Adult          Safety       Safe Mobility Techniques (Progressing)       Start:  04/17/24    Expected End:  05/01/24       Pt will correctly identify and demonstrate safe mobility techniques to reduce their risks for falls during their acute care stay                   Education Documentation  Mobility Training, taught by Isidro Silva, PT at 4/17/2024 11:44 AM.  Learner: Family, Patient  Readiness: Acceptance  Method: Explanation, Demonstration  Response: Needs Reinforcement  Comment: safe mobility techniques    Education Comments  No comments found.

## 2024-04-17 NOTE — PROGRESS NOTES
04/17/24 1733   Discharge Planning   Living Arrangements Alone   Support Systems Children;Family members   Assistance Needed independent with care   Type of Residence Private residence  (one level condo)   Number of Stairs to Enter Residence 2   Number of Stairs Within Residence 0   Do you have animals or pets at home? No   Who is requesting discharge planning? Patient   Home or Post Acute Services None   Patient expects to be discharged to: home with no needs, daughter will stay with patient for awhile   Does the patient need discharge transport arranged? No   Financial Resource Strain   How hard is it for you to pay for the very basics like food, housing, medical care, and heating? Not hard   Housing Stability   In the last 12 months, was there a time when you were not able to pay the mortgage or rent on time? N   In the last 12 months, was there a time when you did not have a steady place to sleep or slept in a shelter (including now)? N   Transportation Needs   In the past 12 months, has lack of transportation kept you from medical appointments or from getting medications? no  (patient drives short distance s)   In the past 12 months, has lack of transportation kept you from meetings, work, or from getting things needed for daily living? No     Audelia Virgen is a 89 y.o. female presenting with not feeling well.  This patient woke up in the middle of the night not feeling well without being able to really explain.  After debating her mind between calling her daughter and 911 she called the ambulance.  Emergency room she was found to be in mild pulmonary edema and bradycardic.      Patient resides at home alone, in a one level condo, still able to drive short distance. Independent with care, able to do iADLs. Still able to exercise and walks daily. Has no outside services.   Patient will return home with no needs, daughter will be checking up on her.

## 2024-04-17 NOTE — PROGRESS NOTES
04/17/24 9478   Physical Activity   On average, how many days per week do you engage in moderate to strenuous exercise (like a brisk walk)? 7 days  (does walking, leg and arm exercises)   Financial Resource Strain   How hard is it for you to pay for the very basics like food, housing, medical care, and heating? Not hard   Housing Stability   In the last 12 months, was there a time when you were not able to pay the mortgage or rent on time? N   In the last 12 months, was there a time when you did not have a steady place to sleep or slept in a shelter (including now)? N   Transportation Needs   In the past 12 months, has lack of transportation kept you from medical appointments or from getting medications? no   In the past 12 months, has lack of transportation kept you from meetings, work, or from getting things needed for daily living? No   Food Insecurity   Within the past 12 months, you worried that your food would run out before you got the money to buy more. Never true   Within the past 12 months, the food you bought just didn't last and you didn't have money to get more. Never true   Stress   Do you feel stress - tense, restless, nervous, or anxious, or unable to sleep at night because your mind is troubled all the time - these days? Not at all   Social Connections   In a typical week, how many times do you talk on the phone with family, friends, or neighbors? More than 3   How often do you get together with friends or relatives? More than 3   How often do you attend Scientology or Shinto services? Never   Do you belong to any clubs or organizations such as Scientology groups, unions, fraternal or athletic groups, or school groups? Yes  (belongs to [employer] clubs)   How often do you attend meetings of the clubs or organizations you belong to? More than 4   Are you , , , , never , or living with a partner?    Intimate Partner Violence   Within the last year, have you been  afraid of your partner or ex-partner? No   Within the last year, have you been humiliated or emotionally abused in other ways by your partner or ex-partner? No   Within the last year, have you been kicked, hit, slapped, or otherwise physically hurt by your partner or ex-partner? No   Within the last year, have you been raped or forced to have any kind of sexual activity by your partner or ex-partner? No   Alcohol Use   Q1: How often do you have a drink containing alcohol? Never   Q2: How many drinks containing alcohol do you have on a typical day when you are drinking? None   Q3: How often do you have six or more drinks on one occasion? Never   Utilities   In the past 12 months has the electric, gas, oil, or water company threatened to shut off services in your home? No   Health Literacy   How often do you need to have someone help you when you read instructions, pamphlets, or other written material from your doctor or pharmacy? Never

## 2024-04-17 NOTE — NURSING NOTE
Patient heart rate down to 35, hospitalist notified, put in atropine orders and call cardiologist. Cardiologist notified states hold off on atropine, patient asymptomatic will be in this evening.

## 2024-04-17 NOTE — CARE PLAN
The patient's goals for the shift include  monitor heart rate    The clinical goals for the shift include monitor heart rate and bp    Problem: Safety - Adult  Goal: Free from fall injury  Outcome: Progressing

## 2024-04-17 NOTE — PROGRESS NOTES
Evaluation    Patient Name: Audelia Virgen  MRN: [id number]  Today's Date: 4/17/2024  Time Calculation  Start Time: 1139  Stop Time: 1154  Time Calculation (min): 15 min    Assessment  IP OT Assessment  OT Assessment: 89 yo female who presented to the ED with generalized weakness and fatigue demonstrates a decline in function d/t medical status restricting mobility and negatively impacting activity tolerance as well as general strength.  Pt would benefit from OT services in-house in order to maximize functional capacity and safety with mobility, potentially after discharge as well pending progress.  Will monitor same and update recommendations upon further evaluation.  Prognosis: Good  Barriers to Discharge: None  End of Session Communication: Bedside nurse  End of Session Patient Position: Bed, 3 rail up, Alarm on    Plan:  Treatment Interventions: ADL retraining, Functional transfer training, UE strengthening/ROM, Endurance training, Equipment evaluation/education, Compensatory technique education, Patient/family training, Continued evaluation  OT Frequency: 3 times per week  OT Discharge Recommendations: Other (Comment) (TBD)        Subjective   Current Problem:  1. Acute congestive heart failure, unspecified heart failure type (Multi)  Transthoracic Echo (TTE) Complete    Transthoracic Echo (TTE) Complete      2. Atrial fibrillation with slow ventricular response (Multi)        3. Hypertension, unspecified type        4. Systolic congestive heart failure due to valvular disease (Multi)  Transthoracic Echo (TTE) Complete    Transthoracic Echo (TTE) Complete        General:  General  Reason for Referral: impaired ADLs  Referred By: Dr Ibarra  Past Medical History Relevant to Rehab: HTN, DM  Family/Caregiver Present: No  Prior to Session Communication: Bedside nurse  Patient Position Received: Bed, 3 rail up, Alarm on  Preferred Learning Style: verbal, visual  General Comment: 89 yo female admited with a-fib and  "possible CHF was cleared by nursing for bedside eval only d/t hypertension and agreeable to same.     Precautions:  Hearing/Visual Limitations: +corrective lenses and bilateral hearing aides  Medical Precautions: Fall precautions, Oxygen therapy device and L/min, Other (comment) (2L via NC)    Pain:  Pain Assessment  Pain Assessment: 0-10  Pain Score: 0 - No pain        Objective     Cognition:  Overall Cognitive Status: Within Functional Limits  Orientation Level: Oriented X4    Home Living:  Type of Home: [place]  Lives With: Alone  Home Adaptive Equipment: Cane  Home Layout: One level  Home Access: Stairs to enter with rails  Entrance Stairs-Rails: Both  Entrance Stairs-Number of Steps: 2  Bathroom Shower/Tub: Walk-in shower  Bathroom Toilet: Standard  Bathroom Equipment: Grab bars in shower, Built-in shower seat, Long handled sponge     Prior Function:  Level of [place]: Independent with ADLs and functional transfers, Independent with homemaking with ambulation  Receives Help From: Family (Children local)  ADL Assistance: Independent  Homemaking Assistance: Independent (no outside assist but states \"I cook when I want to cook\" and \"I don't clean, other than my kitchen\")  Ambulatory Assistance: Independent (furniture walker)  Leisure: enjoys going to the [employer] and playing bridge  Prior Function Comments:  (+drives)    ADL:  Eating Assistance: Stand by  Eating Deficit: Setup  Grooming Assistance: Stand by  Grooming Deficit: Setup  Bathing Assistance: Moderate  Bathing Deficit: Buttocks, Right lower leg including foot, Left lower leg including foot  UE Dressing Assistance: Stand by  UE Dressing Deficit: Setup  LE Dressing Assistance: Moderate  LE Dressing Deficit: Don/doff R sock, Don/doff L sock, Don/doff R shoe, Don/doff L shoe  Toileting Assistance with Device: Maximal  ADL Comments: all ADLs projected this date d/t medical issues    Activity Tolerance:  Endurance: Decreased tolerance for " "upright activites    Bed Mobility/Transfers: Bed Mobility  Bed Mobility: No (deferred per nursing's request:  supervision supine to/from sit with PT earlier this date)    Transfers  Transfer: No (2 person assist bed to/from BSC per nursing this date)    Sensation:  Light Touch: No apparent deficits  Sensation Comment: denies tingling/numbness    Strength:  Strength Comments: BUEs = ~4-/5 grossly    Hand Function:  Hand Function  Gross Grasp: Functional  Coordination: Functional    Extremities: RUE   RUE : Within Functional Limits (difficulty with shoulder flexion bilaterally but WFL:  \"I'm working on it\") and MARJORIE AMADOR: Within Functional Limits    Outcome Measures: [facility] Daily Activity  Putting on and taking off regular lower body clothing: A lot  Bathing (including washing, rinsing, drying): A lot  Putting on and taking off regular upper body clothing: A little  Toileting, which includes using toilet, bedpan or urinal: A lot  Taking care of personal grooming such as brushing teeth: A little  Eating Meals: A little  Daily Activity - Total Score: 15    Goals:   Encounter Problems       Encounter Problems (Active)       OT Goals       ADLs (Progressing)       Start:  04/17/24    Expected End:  05/01/24       Pt will complete bathing, dressing, and toileting tasks independently using adaptive aides and with increased time as needed.         Functional transfers (Progressing)       Start:  04/17/24    Expected End:  05/01/24       Pt will complete toilet, bed, and chair transfers independently from elevated seat heights and using bilateral arm supports as needed.         Activity tolerance (Progressing)       Start:  04/17/24    Expected End:  05/01/24       Pt will tolerate 25 minutes of therapeutic activity in order to increase functional endurance needed for I/ADLs.                   "

## 2024-04-17 NOTE — PROGRESS NOTES
04/17/24 1733   [facility] Disability Status   Are you deaf or do you have serious difficulty hearing? Y   Are you blind or do you have serious difficulty seeing, even when wearing glasses? N   Because of a physical, mental, or emotional condition, do you have serious difficulty concentrating, remembering, or making decisions? (5 years old or older) N   Do you have serious difficulty walking or climbing stairs? N   Do you have serious difficulty dressing or bathing? N   Because of a physical, mental, or emotional condition, do you have serious difficulty doing errands alone such as visiting the doctor? N

## 2024-04-17 NOTE — H&P
History Of Present Illness  Audelia Virgen is a 89 y.o. female presenting with not feeling well.  This patient woke up in the middle of the night not feeling well without being able to really explain.  After debating her mind between calling her daughter and 911 she called the ambulance.  Emergency room she was found to be in mild pulmonary edema and bradycardic.  Her metoprolol was held she was given atropine Lasix ER physician discussed the case with cardiology Dr. Woodall who recommended admission here and hold metoprolol.  The patient feels much better now that feeling of not feeling well has resolved she denies currently any shortness of breath chest pain abdominal pain or any other acute symptoms.  Her daughter was at bedside tells me the patient lives alone and she is very active and independent with daily activities she even drives     Past Medical History  She has no past medical history on file.  Atrial fibrillation on warfarin diabetes hypertension  Surgical History  She has no past surgical history on file.  Reviewed  Social History  She has no history on file for tobacco use, alcohol use, and drug use.  Reviewed  Family History  No family history on file.     Allergies  Patient has no known allergies.    ROS  Review of Systems   Constitutional: Negative.    HENT: Negative.     Eyes: Negative.    Respiratory: Negative.     Cardiovascular: Negative.    Gastrointestinal: Negative.    Endocrine: Negative.    Genitourinary: Negative.    Musculoskeletal: Negative.    Skin: Negative.    Allergic/Immunologic: Negative.    Neurological: Negative.    Hematological: Negative.    Psychiatric/Behavioral: Negative.     All other systems reviewed and are negative.       Last Recorded Vitals  BP (!) 188/74   Pulse 52   Temp 36.6 °C (97.9 °F) (Oral)   Resp (!) 24   Wt 62.3 kg (137 lb 5.6 oz)   SpO2 95%     Physical Exam  Assessed patient emergency room 14 the presence of her daughter and her son-in-law  Alert oriented  x 3 cooperative no distress  Normocephalic atraumatic EOMI PERRLA  Chest fairly clear  Heart irregular heart rate is currently 60 blood pressure is 180/90  Abdomen soft nontender benign exam no rebound no guarding positive bowel sounds  Extremities nonperforating good pedal pulses  Neurologic examination gross muscles are nonfocal  Skin warm dry no rash    Relevant Results  Chest x-ray showing CHF troponin 24 INR 2.2 proBNP 2000    ASSESSMENT/PLAN  Assessment/Plan   Principal Problem:    Atrial fibrillation with slow ventricular response (Multi)    Impression is for:  1.  Bradycardia A-fib hold metoprolol monitor in stepdown cardiology consultation May need repeat atropine  2.  Congestive heart failure likely related to bradycardic A-fib agree with Lasix and atropine monitor response  3.  Feeling of malaise now resolved most likely related to congestive heart failure but will observe for any additional issues  4.  Diabetes mellitus type 2  5.  Continue warfarin  6.  Hypertension monitor for now adjust meds if needed  7.  CODE STATUS will complete discussion when able       Sergo Medel MD

## 2024-04-18 ENCOUNTER — APPOINTMENT (OUTPATIENT)
Dept: CARDIOLOGY | Facility: HOSPITAL | Age: 89
DRG: 242 | End: 2024-04-18
Payer: MEDICARE

## 2024-04-18 PROBLEM — R00.1 BRADYCARDIA: Status: ACTIVE | Noted: 2024-04-17

## 2024-04-18 LAB
ALBUMIN SERPL-MCNC: 3.5 G/DL (ref 3.5–5)
ALP BLD-CCNC: 75 U/L (ref 35–125)
ALT SERPL-CCNC: 18 U/L (ref 5–40)
ANION GAP SERPL CALC-SCNC: 10 MMOL/L
AST SERPL-CCNC: 24 U/L (ref 5–40)
BACTERIA UR CULT: NORMAL
BILIRUB SERPL-MCNC: 0.7 MG/DL (ref 0.1–1.2)
BUN SERPL-MCNC: 30 MG/DL (ref 8–25)
CALCIUM SERPL-MCNC: 9.4 MG/DL (ref 8.5–10.4)
CHLORIDE SERPL-SCNC: 103 MMOL/L (ref 97–107)
CO2 SERPL-SCNC: 27 MMOL/L (ref 24–31)
CREAT SERPL-MCNC: 1.3 MG/DL (ref 0.4–1.6)
EGFRCR SERPLBLD CKD-EPI 2021: 38 ML/MIN/1.73M*2
ERYTHROCYTE [DISTWIDTH] IN BLOOD BY AUTOMATED COUNT: 14.5 % (ref 11.5–14.5)
GLUCOSE BLD MANUAL STRIP-MCNC: 101 MG/DL (ref 74–99)
GLUCOSE BLD MANUAL STRIP-MCNC: 106 MG/DL (ref 74–99)
GLUCOSE BLD MANUAL STRIP-MCNC: 124 MG/DL (ref 74–99)
GLUCOSE BLD MANUAL STRIP-MCNC: 146 MG/DL (ref 74–99)
GLUCOSE SERPL-MCNC: 116 MG/DL (ref 65–99)
HCT VFR BLD AUTO: 37.4 % (ref 36–46)
HGB BLD-MCNC: 12.4 G/DL (ref 12–16)
INR PPP: 2.1 (ref 0.9–1.2)
MCH RBC QN AUTO: 32.3 PG (ref 26–34)
MCHC RBC AUTO-ENTMCNC: 33.2 G/DL (ref 32–36)
MCV RBC AUTO: 97 FL (ref 80–100)
NRBC BLD-RTO: ABNORMAL /100{WBCS}
PLATELET # BLD AUTO: 173 X10*3/UL (ref 150–450)
POTASSIUM SERPL-SCNC: 3.9 MMOL/L (ref 3.4–5.1)
PROT SERPL-MCNC: 6.7 G/DL (ref 5.9–7.9)
PROTHROMBIN TIME: 21.2 SECONDS (ref 9.3–12.7)
RBC # BLD AUTO: 3.84 X10*6/UL (ref 4–5.2)
SODIUM SERPL-SCNC: 140 MMOL/L (ref 133–145)
WBC # BLD AUTO: 10.8 X10*3/UL (ref 4.4–11.3)

## 2024-04-18 PROCEDURE — 97110 THERAPEUTIC EXERCISES: CPT | Mod: GP,CQ

## 2024-04-18 PROCEDURE — 93010 ELECTROCARDIOGRAM REPORT: CPT | Performed by: INTERNAL MEDICINE

## 2024-04-18 PROCEDURE — 2500000004 HC RX 250 GENERAL PHARMACY W/ HCPCS (ALT 636 FOR OP/ED): Performed by: INTERNAL MEDICINE

## 2024-04-18 PROCEDURE — 85610 PROTHROMBIN TIME: CPT | Performed by: INTERNAL MEDICINE

## 2024-04-18 PROCEDURE — 2500000001 HC RX 250 WO HCPCS SELF ADMINISTERED DRUGS (ALT 637 FOR MEDICARE OP): Performed by: INTERNAL MEDICINE

## 2024-04-18 PROCEDURE — 97535 SELF CARE MNGMENT TRAINING: CPT | Mod: GO,CO

## 2024-04-18 PROCEDURE — 85027 COMPLETE CBC AUTOMATED: CPT | Performed by: INTERNAL MEDICINE

## 2024-04-18 PROCEDURE — 82947 ASSAY GLUCOSE BLOOD QUANT: CPT

## 2024-04-18 PROCEDURE — 36415 COLL VENOUS BLD VENIPUNCTURE: CPT | Performed by: INTERNAL MEDICINE

## 2024-04-18 PROCEDURE — 80053 COMPREHEN METABOLIC PANEL: CPT | Performed by: INTERNAL MEDICINE

## 2024-04-18 PROCEDURE — C9113 INJ PANTOPRAZOLE SODIUM, VIA: HCPCS | Performed by: INTERNAL MEDICINE

## 2024-04-18 PROCEDURE — 97116 GAIT TRAINING THERAPY: CPT | Mod: GP,CQ

## 2024-04-18 PROCEDURE — 93005 ELECTROCARDIOGRAM TRACING: CPT

## 2024-04-18 PROCEDURE — 2060000001 HC INTERMEDIATE ICU ROOM DAILY

## 2024-04-18 PROCEDURE — 2500000001 HC RX 250 WO HCPCS SELF ADMINISTERED DRUGS (ALT 637 FOR MEDICARE OP)

## 2024-04-18 RX ORDER — POLYETHYLENE GLYCOL 3350 17 G/17G
17 POWDER, FOR SOLUTION ORAL DAILY
Status: DISCONTINUED | OUTPATIENT
Start: 2024-04-18 | End: 2024-04-19 | Stop reason: HOSPADM

## 2024-04-18 RX ORDER — LOSARTAN POTASSIUM 50 MG/1
50 TABLET ORAL DAILY
Start: 2024-04-19

## 2024-04-18 RX ORDER — FERROUS SULFATE, DRIED 160(50) MG
1 TABLET, EXTENDED RELEASE ORAL DAILY
Status: DISCONTINUED | OUTPATIENT
Start: 2024-04-18 | End: 2024-04-19 | Stop reason: HOSPADM

## 2024-04-18 RX ORDER — PANTOPRAZOLE SODIUM 40 MG/1
40 TABLET, DELAYED RELEASE ORAL
Start: 2024-04-19

## 2024-04-18 RX ORDER — CEFTRIAXONE 1 G/50ML
1 INJECTION, SOLUTION INTRAVENOUS EVERY 24 HOURS
Start: 2024-04-19 | End: 2024-04-22 | Stop reason: HOSPADM

## 2024-04-18 RX ORDER — DOPAMINE HYDROCHLORIDE 160 MG/100ML
3 INJECTION, SOLUTION INTRAVENOUS CONTINUOUS
Start: 2024-04-18 | End: 2024-04-22 | Stop reason: HOSPADM

## 2024-04-18 RX ORDER — TALC
3 POWDER (GRAM) TOPICAL NIGHTLY PRN
Start: 2024-04-18

## 2024-04-18 RX ORDER — INSULIN LISPRO 100 [IU]/ML
0-10 INJECTION, SOLUTION INTRAVENOUS; SUBCUTANEOUS
Start: 2024-04-18 | End: 2024-04-22 | Stop reason: HOSPADM

## 2024-04-18 RX ADMIN — ACETAMINOPHEN 650 MG: 325 TABLET ORAL at 18:45

## 2024-04-18 RX ADMIN — Medication 1 TABLET: at 12:23

## 2024-04-18 RX ADMIN — PANTOPRAZOLE SODIUM 40 MG: 40 INJECTION, POWDER, FOR SOLUTION INTRAVENOUS at 05:08

## 2024-04-18 RX ADMIN — LOSARTAN POTASSIUM 50 MG: 50 TABLET, FILM COATED ORAL at 09:18

## 2024-04-18 RX ADMIN — ALLOPURINOL 100 MG: 100 TABLET ORAL at 21:03

## 2024-04-18 RX ADMIN — CEFTRIAXONE SODIUM 1 G: 1 INJECTION, SOLUTION INTRAVENOUS at 12:24

## 2024-04-18 ASSESSMENT — COGNITIVE AND FUNCTIONAL STATUS - GENERAL
WALKING IN HOSPITAL ROOM: A LITTLE
STANDING UP FROM CHAIR USING ARMS: A LITTLE
MOBILITY SCORE: 17
DRESSING REGULAR UPPER BODY CLOTHING: A LITTLE
HELP NEEDED FOR BATHING: A LOT
PERSONAL GROOMING: A LITTLE
TOILETING: A LITTLE
MOBILITY SCORE: 17
MOVING FROM LYING ON BACK TO SITTING ON SIDE OF FLAT BED WITH BEDRAILS: A LITTLE
HELP NEEDED FOR BATHING: A LOT
DAILY ACTIVITIY SCORE: 18
TOILETING: A LITTLE
DRESSING REGULAR LOWER BODY CLOTHING: A LITTLE
DRESSING REGULAR LOWER BODY CLOTHING: A LITTLE
MOVING TO AND FROM BED TO CHAIR: A LITTLE
WALKING IN HOSPITAL ROOM: A LITTLE
MOVING TO AND FROM BED TO CHAIR: A LITTLE
PERSONAL GROOMING: A LITTLE
TURNING FROM BACK TO SIDE WHILE IN FLAT BAD: A LITTLE
STANDING UP FROM CHAIR USING ARMS: A LITTLE
CLIMB 3 TO 5 STEPS WITH RAILING: A LOT
DRESSING REGULAR UPPER BODY CLOTHING: A LITTLE
DAILY ACTIVITIY SCORE: 18
MOVING FROM LYING ON BACK TO SITTING ON SIDE OF FLAT BED WITH BEDRAILS: A LITTLE
CLIMB 3 TO 5 STEPS WITH RAILING: A LOT
TURNING FROM BACK TO SIDE WHILE IN FLAT BAD: A LITTLE

## 2024-04-18 ASSESSMENT — PAIN - FUNCTIONAL ASSESSMENT
PAIN_FUNCTIONAL_ASSESSMENT: 0-10

## 2024-04-18 ASSESSMENT — PAIN DESCRIPTION - LOCATION: LOCATION: LEG

## 2024-04-18 ASSESSMENT — ACTIVITIES OF DAILY LIVING (ADL)
BATHING_LEVEL_OF_ASSISTANCE: MAXIMUM ASSISTANCE
BATHING_WHERE_ASSESSED: STANDING SINKSIDE

## 2024-04-18 ASSESSMENT — PAIN SCALES - GENERAL
PAINLEVEL_OUTOF10: 9
PAINLEVEL_OUTOF10: 0 - NO PAIN

## 2024-04-18 ASSESSMENT — PAIN DESCRIPTION - ORIENTATION: ORIENTATION: RIGHT;LEFT

## 2024-04-18 NOTE — NURSING NOTE
Pt heart rate 30s 40s occasionally dropping to 28. Hospitals notified. Dopamine drip rate increased to 5mcg/kg/min

## 2024-04-18 NOTE — CONSULTS
Inpatient consult to Cardiology  Consult performed by: JAIMIE Boateng-CNP  Consult ordered by: Sergo Medel MD  Reason for consult: Atrial Fibrillation slow ventricular response        History Of Present Illness:     Audelia Virgen is a 89 y.o. female who presented with a 3-day history of of worsening fatigue, not feeling well.  The patient herself called 911.  The patient denied any near-syncope or syncope but describes intermittent lightheadedness.  The patient has a history of permanent atrial fibrillation longstanding metoprolol extended release 100 mg nightly, and warfarin for stroke prevention, type 2 diabetes on metformin, gout, and osteoarthritis.  She has no documented history of coronary artery disease, cardiomyopathy, prior incidence of bradycardia or breast cancer.  The daughter states her mother feels well, lives alone, drives and enjoys playing cards.  In the emergency room patient's heart rate was in the 30s.  She was treated with atropine with some improvement with diuretic for mild pulmonary edema.  Laboratory studies were essentially unremarkable her urine was positive and begun on antibiotics so there was no fever or leukocytosis.  Since admission she has been mildly hypertensive with continued rates in the 30s, 4/18 at 4 AM with rates in the 20s started on dopamine, currently at 3 mcg/kg/min with heart rates in the 40s.  She is able to lie flat and only complains of leg restlessness with a history of restless leg syndrome.  The electrophysiology service was asked to evaluate for whether permanent pacing was indicated.      Last Recorded Vitals:  Vitals:    04/18/24 0847 04/18/24 0944 04/18/24 1101 04/18/24 1501   BP: 153/51 157/66 149/56 140/78   BP Location: Right arm Right arm Right arm Right arm   Patient Position: Lying Lying Lying Sitting   Pulse: (!) 47 51 (!) 38 54   Resp: 22 23 22 20   Temp: 36.7 °C (98.1 °F) 36.7 °C (98.1 °F) 36.6 °C (97.9 °F) 36.4 °C (97.5 °F)   TempSrc:  Oral Oral Oral Oral   SpO2: 97% 96% 96% 96%   Weight:       Height:           Last Labs:  CBC - 4/18/2024:  4:35 AM  10.8 12.4 173    37.4      CMP - 4/18/2024:  4:35 AM  9.4 6.7 24 --- 0.7   _ 3.5 18 75      PTT - 4/17/2024:  5:41 AM  2.1   21.2 34.2     Hemoglobin A1C   Date/Time Value Ref Range Status   03/06/2024 10:15 AM 5.9 (H) 4.3 - 5.6 % Final     Comment:     American Diabetes Association guidelines indicate that patients with HgbA1c in the range 5.7-6.4% are at increased risk for development of diabetes, and intervention by lifestyle modification may be beneficial. HgbA1c greater or equal to 6.5% is considered diagnostic of diabetes.   10/10/2023 08:51 AM 6.1 (H) 4.3 - 5.6 % Final     Comment:     American Diabetes Association guidelines indicate that patients with HgbA1c in the range 5.7-6.4% are at increased risk for development of diabetes, and intervention by lifestyle modification may be beneficial. HgbA1c greater or equal to 6.5% is considered diagnostic of diabetes.     LDL Calculated   Date/Time Value Ref Range Status   04/21/2022 11:05 AM 48 (L) 65 - 130 MG/DL Final   12/14/2021 10:15 AM 55 (L) 65 - 130 MG/DL Final   06/08/2021 11:09 AM 46 (L) 65 - 130 MG/DL Final      Last I/O:  I/O last 3 completed shifts:  In: 175.5 (2.8 mL/kg) [I.V.:75.5 (1.2 mL/kg); IV Piggyback:100]  Out: 350 (5.6 mL/kg) [Urine:350 (0.2 mL/kg/hr)]  Weight: 62.3 kg     Past Cardiology Tests (Last 3 Years):  EKG:  Electrocardiogram, 12-lead PRN ACS symptoms 04/18/2024 (Preliminary)      ECG 12 lead 04/17/2024 (Preliminary)      ECG 12 lead 04/17/2024 (Preliminary)    Echo:  Transthoracic Echo (TTE) Complete 04/17/2024    Ejection Fractions:    Cath:  No results found for this or any previous visit from the past 1095 days.    Stress Test:  No results found for this or any previous visit from the past 1095 days.    Cardiac Imaging:  No results found for this or any previous visit from the past 1095 days.      Past Medical  History:  She has a past medical history of Diabetes mellitus (Multi) and Hypertension.    Past Surgical History:  She has a past surgical history that includes Mastectomy and Hysterectomy.      Social History:  She reports that she has never smoked. She has never used smokeless tobacco. She reports that she does not currently use alcohol. She reports that she does not use drugs.    Family History:  Family History   Problem Relation Name Age of Onset    No Known Problems Mother      No Known Problems Father          Allergies:  Patient has no known allergies.    Inpatient Medications:  Scheduled medications   Medication Dose Route Frequency    allopurinol  100 mg oral Daily    atropine  0.5 mg intravenous Once    calcium carbonate-vitamin D3  1 tablet oral Daily    cefTRIAXone  1 g intravenous q24h    insulin lispro  0-10 Units subcutaneous TID with meals    losartan  50 mg oral Daily    pantoprazole  40 mg oral Daily before breakfast    Or    pantoprazole  40 mg intravenous Daily before breakfast    polyethylene glycol  17 g oral Daily    [Held by provider] warfarin  5 mg oral Daily     PRN medications   Medication    acetaminophen    Or    acetaminophen    Or    acetaminophen    dextrose    dextrose    glucagon    glucagon    melatonin     Continuous Medications   Medication Dose Last Rate    DOPamine  3 mcg/kg/min 3 mcg/kg/min (04/18/24 0427)     Outpatient Medications:  Current Outpatient Medications   Medication Instructions    allopurinol (ZYLOPRIM) 100 mg, oral, Daily    calcium carbonate-vitamin D3 500 mg-5 mcg (200 unit) tablet 1 tablet, oral, Daily    [START ON 4/19/2024] cefTRIAXone (Rocephin) 1 gram/50 mL IVPB 1 g, intravenous, Every 24 hours    DOPamine 1,600 mcg/mL infusion 3 mcg/kg/min, intravenous, Continuous    insulin lispro (HUMALOG) 0-10 Units, subcutaneous, 3 times daily with meals, Take as directed per insulin instructions.    [START ON 4/19/2024] losartan (COZAAR) 50 mg, oral, Daily     losartan-hydrochlorothiazide (Hyzaar) 100-12.5 mg tablet 1 tablet, oral, Daily, Family will clarify dose    losartan-hydrochlorothiazide (Hyzaar) 50-12.5 mg tablet 1 tablet, oral, Daily    melatonin 3 mg, oral, Nightly PRN    metFORMIN (GLUCOPHAGE) 500 mg, oral, Daily with breakfast    metoprolol succinate XL (TOPROL-XL) 100 mg, oral, Daily, Do not crush or chew.    [START ON 4/19/2024] pantoprazole (PROTONIX) 40 mg, oral, Daily before breakfast, Do not crush, chew, or split.    warfarin (COUMADIN) 5 mg, oral, Take as directed per After Visit Summary.       Physical Exam:   Gen: A+O x 3, no acute distress  HEENT: Normocephalic/atraumatic pupils equal react light  Neck: No JVD, upstrokes and volumes nl, no bruits   Lung: CTA, nl AP diameter  CV:  nl sounding S1, S2, no murmur, PMI nondisplaced  Abdomen: soft, non tender, + BS x 4   Extremities: warm to touch, palpable pulses bilaterally, no edema   Neuro: no neurologic deficits            Assessment/Plan   89-year-old female with longstanding permanent atrial fibrillation, hypertension maintained on antihypertensive with high dose of metoprolol now with marked symptomatic bradycardia   -the patient was on a large dose of metoprolol  mg every day which could take 48 to 72 hours for complete washout though near 48 hours with continued slow rates, last evening even requiring tiny dose of dopamine to maintain 40 bpm and above   -The patient has been hemodynamically stable and mentating appropriately  -Found to have a UTI initiated on antibiotics, no fever or white count  -Most likely will require device implant would only need to be a single-chamber device either a Micra or single-chamber standard pacemaker though would reevaluate heart rates in the a.m. and assess chronotropic response when up and about  -INR today 2.1 will hold warfarin, n.p.o. after midnight  -Echocardiogram with preserved left ventricular function no elevated RV systolic pressure, MR and  TR  -When resume medications for hypertension would use ARB for afterload reduction along with diuretic for LV stiffness with diabetes and propensity to mild volume overload with A-fib  -Could consider nitrate for right sided evaded pressures    Discussed with Dr. Mason and Dr. LISANDRO Patricia who will place device    Peripheral IV 04/17/24 20 G Proximal;Right;Ventral Forearm (Active)   Site Assessment Clean;Dry;Intact 04/18/24 0900   Dressing Status Clean;Dry 04/18/24 0900   Number of days: 1       Peripheral IV 04/17/24 22 G Distal;Right Forearm (Active)   Site Assessment Clean;Dry;Intact 04/18/24 0900   Dressing Status Clean;Dry 04/18/24 0900   Number of days: 1       Code Status:  Full Code    I spent 60 minutes in the professional and overall care of this patient.        JAIMIE Boategn-CNP

## 2024-04-18 NOTE — PROGRESS NOTES
04/18/24 1538   Discharge Planning   Living Arrangements Alone   Support Systems Children;Family members   Assistance Needed independent with care   Type of Residence Private residence   Patient expects to be discharged to: Home, the patient recommended pacemaker she is being transferred to [facility] for that purpose.   Does the patient need discharge transport arranged? Yes   RoundTrip coordination needed? Yes     Mild UTI  Heart failure echo results pending  She was admitted initially with the plan to stop her metoprolol will improve her heart rates but this did not happen electrophysiology evaluated the patient recommended pacemaker she is being transferred to [facility] for that purpose.

## 2024-04-18 NOTE — PROGRESS NOTES
Occupational Therapy    OT Treatment    Patient Name: Audelia Virgen  MRN: [id number]  Today's Date: 4/18/2024  Time Calculation  Start Time: 1359  Stop Time: 1445  Time Calculation (min): 46 min         Assessment:  OT Assessment: Pt presents with fair balance, fair activity tolerance, good standing tolerance and is functioning at supervision levels, CGA for safety  End of Session Communication: PCT/NA/CTA  End of Session Patient Position: Up in chair (daughter in room, all needs in reach.)  OT Assessment Results: Decreased ADL status, Decreased endurance  Plan:  Treatment Interventions: ADL retraining, Functional transfer training, Endurance training  OT Frequency: 3 times per week  OT Discharge Recommendations: Moderate intensity level of continued care  Equipment Recommended upon Discharge: Wheeled walker  OT - OK to Discharge: Yes  Treatment Interventions: ADL retraining, Functional transfer training, Endurance training    Subjective   Previous Visit Info:  OT Last Visit  OT Received On: 04/18/24  General:  General  Referred By: Dr Ibarra  Past Medical History Relevant to Rehab: HTN, DM  Patient Position Received: Up in chair  Precautions:  Medical Precautions: Fall precautions, Oxygen therapy device and L/min (3Lnc)  Pain:  Pain Assessment  Pain Assessment: 0-10  Pain Score: 0 - No pain    Objective       Activities of Daily Living: Grooming  Grooming Level of Assistance: Close supervision  Grooming Where Assessed: Standing sinkside  Grooming Comments: Pt washed face, brushed teeth, combed hair,  in stance    UE Bathing  UE Bathing Level of Assistance: Close supervision  UE Bathing Where Assessed: Standing sinkside  UE Bathing Comments: sponge bathing    LE Bathing  LE Bathing Level of Assistance: Maximum assistance  LE Bathing Where Assessed: Standing sinkside  LE Bathing Comments: Assist for jessie area in stance, BLE/feet, seated    LE Dressing  Sock Level of Assistance: Maximum assistance  LE Dressing Where  Assessed: Chair  LE Dressing Comments: brief management only during jessie area bathing       Functional Standing Tolerance:  Time: 15 minutes  Activity: self care  Functional Standing Tolerance Comments: fair balance, good standing tolerance  Bed Mobility/Transfers:      Transfer 1  Transfer From 1: Chair with arms to  Transfer to 1: Chair without arms  Technique 1: Sit to stand, Stand to sit  Transfer Device 1: Walker  Transfer Level of Assistance 1: Contact guard  Trials/Comments 1: cues for safe hand placement, backing up fully to chair  Transfers 2  Transfer From 2: Chair without arms to  Transfer to 2: Chair with arms  Technique 2: To right  Transfer Device 2: Walker  Transfer Level of Assistance 2: Contact guard  Trials/Comments 2: cues for safe hand placement.      Functional Mobility:  Functional Mobility 1  Surface 1: Level tile  Device 1: Rolling walker  Assistance 1: Contact guard  Quality of Functional Mobility 1:  (reciprocal gait, steady pace)  Comments 1: 15 feet x2  Outcome Measures:[facility] Daily Activity  Putting on and taking off regular lower body clothing: A little  Bathing (including washing, rinsing, drying): A lot  Putting on and taking off regular upper body clothing: A little  Toileting, which includes using toilet, bedpan or urinal: A little  Taking care of personal grooming such as brushing teeth: A little  Eating Meals: None  Daily Activity - Total Score: 18        Education Documentation  ADL Training, taught by CHERRI Mauricio at 4/18/2024  3:03 PM.  Learner: Patient  Readiness: Acceptance  Method: Explanation, Demonstration  Response: Verbalizes Understanding, Demonstrated Understanding, Needs Reinforcement    Education Comments  No comments found.         Goals:  Encounter Problems       Encounter Problems (Active)       OT Goals       ADLs (Progressing)       Start:  04/17/24    Expected End:  05/01/24       Pt will complete bathing, dressing, and toileting tasks independently using  adaptive aides and with increased time as needed.         Functional transfers (Progressing)       Start:  04/17/24    Expected End:  05/01/24       Pt will complete toilet, bed, and chair transfers independently from elevated seat heights and using bilateral arm supports as needed.         Activity tolerance (Progressing)       Start:  04/17/24    Expected End:  05/01/24       Pt will tolerate 25 minutes of therapeutic activity in order to increase functional endurance needed for I/ADLs.

## 2024-04-18 NOTE — CONSULTS
Consults  History Of Present Illness:    Audelia Virgen is a 89 y.o. female presenting with not feeling well.  This patient woke up in the middle of the night not feeling well without being able to really explain.  After debating her mind between calling her daughter and 911 she called the ambulance.  Emergency room she was found to be in mild pulmonary edema and bradycardic.  Her metoprolol was held she was given atropine Lasix ER physician discussed the case with cardiology Dr. Woodall who recommended admission here and hold metoprolol.  The patient feels much better now that feeling of not feeling well has resolved she denies currently any shortness of breath chest pain abdominal pain or any other acute symptoms.  Her daughter was at bedside tells me the patient lives alone and she is very active and independent with daily activities she even drives.       Last Recorded Vitals:  Vitals:    04/17/24 1100 04/17/24 1423 04/17/24 1614 04/1935   BP: 170/64 159/56 178/60 163/60   BP Location:   Right arm Right arm   Patient Position:   Lying Lying   Pulse: (!) 37 (!) 35 (!) 41    Resp:   14 15   Temp:   37 °C (98.6 °F) 36.8 °C (98.2 °F)   TempSrc:   Oral Oral   SpO2:   94% 94%   Weight:       Height:           Last Labs:  CBC - 4/17/2024:  5:41 AM  8.1 12.4 186    37.9      CMP - 4/17/2024:  5:41 AM  9.3 7.3 29 --- 0.9   _ 4.0 20 80      PTT - 4/17/2024:  5:41 AM  2.2   21.8 34.2     Hemoglobin A1C   Date/Time Value Ref Range Status   03/06/2024 10:15 AM 5.9 (H) 4.3 - 5.6 % Final     Comment:     American Diabetes Association guidelines indicate that patients with HgbA1c in the range 5.7-6.4% are at increased risk for development of diabetes, and intervention by lifestyle modification may be beneficial. HgbA1c greater or equal to 6.5% is considered diagnostic of diabetes.   10/10/2023 08:51 AM 6.1 (H) 4.3 - 5.6 % Final     Comment:     American Diabetes Association guidelines indicate that patients with HgbA1c in  "the range 5.7-6.4% are at increased risk for development of diabetes, and intervention by lifestyle modification may be beneficial. HgbA1c greater or equal to 6.5% is considered diagnostic of diabetes.     LDL Calculated   Date/Time Value Ref Range Status   04/21/2022 11:05 AM 48 (L) 65 - 130 MG/DL Final   12/14/2021 10:15 AM 55 (L) 65 - 130 MG/DL Final   06/08/2021 11:09 AM 46 (L) 65 - 130 MG/DL Final      Last I/O:  No intake/output data recorded.    Past Cardiology Tests (Last 3 Years):  EKG:  ECG 12 lead 04/17/2024 (Preliminary)      ECG 12 lead 04/17/2024 (Preliminary)    Echo:  Transthoracic Echo (TTE) Complete 04/17/2024    Ejection Fractions:  No results found for: \"EF\"  Cath:  No results found for this or any previous visit from the past 1095 days.    Stress Test:  No results found for this or any previous visit from the past 1095 days.    Cardiac Imaging:  No results found for this or any previous visit from the past 1095 days.      Past Medical History:  She has a past medical history of Diabetes mellitus (Multi) and Hypertension.    Past Surgical History:  She has a past surgical history that includes Mastectomy and Hysterectomy.      Social History:  She reports that she has never smoked. She has never used smokeless tobacco. She reports that she does not currently use alcohol. She reports that she does not use drugs.    Family History:  Family History   Problem Relation Name Age of Onset    No Known Problems Mother      No Known Problems Father          Allergies:  Patient has no known allergies.    Inpatient Medications:  Scheduled medications   Medication Dose Route Frequency    allopurinol  100 mg oral Daily    atropine  0.5 mg intravenous Once    calcium carbonate-vitamin D3  1 tablet oral Daily    cefTRIAXone  1 g intravenous q24h    insulin lispro  0-10 Units subcutaneous TID with meals    [START ON 4/18/2024] losartan  50 mg oral Daily    pantoprazole  40 mg oral Daily before breakfast    Or    " pantoprazole  40 mg intravenous Daily before breakfast    polyethylene glycol  17 g oral Daily    warfarin  5 mg oral Daily     PRN medications   Medication    acetaminophen    Or    acetaminophen    Or    acetaminophen    dextrose    dextrose    glucagon    glucagon    melatonin     Continuous Medications   Medication Dose Last Rate     Outpatient Medications:  Current Outpatient Medications   Medication Instructions    allopurinol (ZYLOPRIM) 100 mg, oral, Daily    calcium carbonate-vitamin D3 500 mg-5 mcg (200 unit) tablet 1 tablet, oral, Daily    losartan-hydrochlorothiazide (Hyzaar) 100-12.5 mg tablet 1 tablet, oral, Daily, Family will clarify dose    losartan-hydrochlorothiazide (Hyzaar) 50-12.5 mg tablet 1 tablet, oral, Daily    metFORMIN (GLUCOPHAGE) 500 mg, oral, Daily with breakfast    metoprolol succinate XL (TOPROL-XL) 100 mg, oral, Daily, Do not crush or chew.    warfarin (COUMADIN) 5 mg, oral, Take as directed per After Visit Summary.       Physical Exam:  HEENT JADYN neck supple lungs-no rales appreciated at the bases posteriorly heart S1-S2 present with systolic murmur best heard in the mitral area abdomen is soft and nontender extremity shows evidence of trace edema in both lower extremities.  Assessment/Plan   1 symptoms of shortness of breath and dizziness secondary to chronotropic incompetence from underlying sick sinus syndrome superimposed on fact the patient was taking long-acting Toprol-XL prior to the hospitalization for recurrent of atrial fibrillation.  Patient admits that she has taken the Toprol-XL last night before dinner which usually takes and around 4 AM she became short of breath with symptoms of dizziness and then presented to the emergency room.  Will continue to observe over the next 12 hours patient is clearly symptomatic at the time of my evaluation with no symptoms of dizziness.  Her resting heart rate telemetry was 46 bpm.  Informed the patient's daughter Olga over the  telephone.  2. HTN-was on losartan hydrochlorothiazide Prior to the hospitalization which will be continued.  3.  Diabetes mellitus type II for which she remains on metformin  Peripheral IV 04/17/24 20 G Proximal;Right;Ventral Forearm (Active)   Site Assessment Clean;Dry;Intact 04/17/24 0900   Dressing Status Clean;Dry 04/17/24 0900   Number of days: 0       Code Status:  Full Code    I spent 35 minutes in the professional and overall care of this patient.        Jose C Pickett MD

## 2024-04-18 NOTE — PROGRESS NOTES
Physical Therapy    Physical Therapy Treatment    Patient Name: Audelia Virgen  MRN: [id number]  Today's Date: 4/18/2024  Time Calculation  Start Time: 1321  Stop Time: 1354  Time Calculation (min): 33 min       Assessment/Plan   PT Assessment  Rehab Prognosis: Good  Assessment Comment: Pt pleasant and cooperative. Motivated to move and wanting to walk. BP WNL today, SpO2 90's with activity and HR 45-71bpm during visit.  End of Session Patient Position: Up in chair, Alarm off, not on at start of session, Alarm off, caregiver present     PT Plan  Treatment/Interventions: Transfer training, Gait training, Therapeutic exercise, Strengthening, Endurance training  PT Plan: Skilled PT  PT Frequency: 5 times per week  PT Discharge Recommendations: Moderate intensity level of continued care  Equipment Recommended upon Discharge: Wheeled walker  PT Recommended Transfer Status: Assist x1, Assistive device  PT - OK to Discharge: Yes      General Visit Information:   PT  Visit  PT Received On: 04/18/24  General  Family/Caregiver Present: Yes  Caregiver Feedback: Daughter present.  Prior to Session Communication: Bedside nurse  Patient Position Received: Up in chair, Alarm off, not on at start of session, Alarm off, caregiver present  Preferred Learning Style: verbal  General Comment: Pt agreeable to therapy    Subjective   Precautions:  Precautions  Hearing/Visual Limitations: +corrective lenses and bilateral hearing aides  Medical Precautions: Fall precautions, Oxygen therapy device and 3L/min, Other (comment)  Vital Signs:   HR 45-71 during visit  BP WNL      Objective   Pain:  Pain Assessment  Pain Assessment: 0-10  Pain Score: 0 - No pain  Cognition:  Cognition  Overall Cognitive Status: Within Functional Limits  Orientation Level: Oriented X4  Postural Control:     Extremity/Trunk Assessments:    Activity Tolerance:     Treatments:  Therapeutic Exercise  Therapeutic Exercise Performed: Yes  Therapeutic Exercise Activity 1:  Pt performed seated bilat LE ankle pumps, heel raises, LAQ, hip flexion, madeline hip adduction and resisted hip abduction 2x10 reps each.    Ambulation/Gait Training  Ambulation/Gait Training Performed: Yes  Ambulation/Gait Training 1  Surface 1: Level tile  Device 1: Rolling walker  Assistance 1: Minimum assistance  Quality of Gait 1: Inconsistent stride length, Decreased step length  Comments/Distance (ft) 1: Pt ambulated with RW ~40' x1 and 45' x1 min assist of 1. Pt demonstrated slow, reciprocal gait. Decreased bilat step height and length. Mild unsteadiness but no actual LOB.  Transfers  Transfer: Yes  Transfer 1  Transfer From 1: Chair with arms to  Transfer to 1: Stand  Technique 1: Sit to stand  Transfer Level of Assistance 1: Contact guard  Transfers 2  Transfer From 2: Stand to  Transfer to 2: Sit, Chair with arms  Technique 2: Stand to sit  Transfer Level of Assistance 2: Contact guard, Minimal verbal cues  Trials/Comments 2: Verbal cues for fully backing up to chair and to reach back for chair arms.    Outcome Measures:  [facility] Basic Mobility  Turning from your back to your side while in a flat bed without using bedrails: A little  Moving from lying on your back to sitting on the side of a flat bed without using bedrails: A little  Moving to and from bed to chair (including a wheelchair): A little  Standing up from a chair using your arms (e.g. wheelchair or bedside chair): A little  To walk in hospital room: A little  Climbing 3-5 steps with railing: A lot  Basic Mobility - Total Score: 17    Education Documentation  Handouts, taught by Sarah Gutierrez PTA at 4/18/2024  2:41 PM.  Learner: Patient  Readiness: Acceptance  Method: Explanation  Response: Needs Reinforcement    Precautions, taught by Sarah Gtuierrez PTA at 4/18/2024  2:41 PM.  Learner: Patient  Readiness: Acceptance  Method: Explanation  Response: Needs Reinforcement    Body Mechanics, taught by Sarah Gutierrez PTA at 4/18/2024  2:41 PM.  Learner:  Patient  Readiness: Acceptance  Method: Explanation  Response: Needs Reinforcement    Home Exercise Program, taught by Sarah Gutierrez PTA at 4/18/2024  2:41 PM.  Learner: Patient  Readiness: Acceptance  Method: Explanation  Response: Needs Reinforcement    Mobility Training, taught by Sarah Gutierrez PTA at 4/18/2024  2:41 PM.  Learner: Patient  Readiness: Acceptance  Method: Explanation  Response: Needs Reinforcement    Education Comments  No comments found.        OP EDUCATION:       Encounter Problems       Encounter Problems (Active)       Balance       Sitting Balance (Progressing)       Start:  04/17/24    Expected End:  05/01/24       Pt will demonstrate good sitting balance to promote safe engagement with out of bed activities           Standing Balance (Progressing)       Start:  04/17/24    Expected End:  05/01/24       Pt will demonstrate good static standing balance to promote safe participation with out of bed activity, transfers, and mobility              Mobility       Ambulation (Progressing)       Start:  04/17/24    Expected End:  05/01/24       Pt will ambulate 50' independent assist with no device to promote safe home mobility           Entry Stair Negotiation (Progressing)       Start:  04/17/24    Expected End:  05/01/24       Pt will ascend/descend 2 stairs with rail(s) on B and modified independent assist to promote safe entry and exit in home environment                PT Transfers       Supine to sit (Progressing)       Start:  04/17/24    Expected End:  05/01/24       Pt will transfer supine to sitting at edge of bed with independent assist to promote acute care out of bed activity           Sit to stand (Progressing)       Start:  04/17/24    Expected End:  05/01/24       Pt will transfer sit to standing position with independent assist and no device to promote safe out of bed activity           Bed to chair (Progressing)       Start:  04/17/24    Expected End:  05/01/24       Pt will  transfer from sitting edge of bed to the chair with independent assist and no device to promote out of bed activity and reduce the risks of prolonged acute care bedrest              Pain - Adult          Safety       Safe Mobility Techniques (Progressing)       Start:  04/17/24    Expected End:  05/01/24       Pt will correctly identify and demonstrate safe mobility techniques to reduce their risks for falls during their acute care stay

## 2024-04-18 NOTE — PROGRESS NOTES
Audelia Virgen is a 89 y.o. female on day 1 of admission presenting with Atrial fibrillation with slow ventricular response (Multi).      Subjective   She was started on low-dose dopamine drip for excessive bradycardia in the 20s  Currently resting comfortably heart rate is anywhere between upper 20s and 60 blood pressure has been stable she does not have any acute shortness of breath or chest pain she has not been up       Objective     Last Recorded Vitals  /53 (BP Location: Right arm, Patient Position: Lying)   Pulse (!) 37   Temp 36.7 °C (98.1 °F) (Oral)   Resp 19   Wt 62.3 kg (137 lb 5.6 oz)   SpO2 95%   Intake/Output last 3 Shifts:    Intake/Output Summary (Last 24 hours) at 4/18/2024 0754  Last data filed at 4/18/2024 0347  Gross per 24 hour   Intake 175.46 ml   Output 350 ml   Net -174.54 ml       Physical Exam  Alert oriented to self place no distress  Chest clear  Heart irregular bradycardic  Abdomen soft nontender  Extremities no edema  Neurologic exam gross nonfocal  Relevant Results  Reviewed  Assessment/Plan     Principal Problem:    Atrial fibrillation with slow ventricular response (Multi)      April 18:  Discussed with electrophysiology who wants me to keep patient n.p.o. and plan on seeing her shortly to consider permanent pacemaker placement continue dopamine at low-dose for now no need to titrate up  Mild UTI  Heart failure echo results pending  Case was also discussed with Dr. Woodall this morning who recommended electrophysiology evaluation and I spoke with Dr. Mason's nurse practitioner Coleen Medel MD

## 2024-04-18 NOTE — DISCHARGE SUMMARY
Discharge Diagnosis  Patient Active Problem List   Diagnosis    Atrial fibrillation with slow ventricular response (Multi)         Issues Requiring Follow-Up  Transferred to [facility] for pacemaker placement    Discharge Meds     Your medication list        START taking these medications        Instructions Last Dose Given Next Dose Due   cefTRIAXone 1 gram/50 mL IVPB  Commonly known as: Rocephin  Start taking on: April 19, 2024      Infuse 50 mL (1 g) at 100 mL/hr over 30 minutes into a venous catheter once every 24 hours.       DOPamine 1,600 mcg/mL infusion      Infuse 186.9 mcg/min at 7.01 mL/hr into a venous catheter continuously.       insulin lispro 100 unit/mL injection  Commonly known as: HumaLOG      Inject 0-0.1 mL (0-10 Units) under the skin 3 times a day with meals. Take as directed per insulin instructions.       losartan 50 mg tablet  Commonly known as: Cozaar  Start taking on: April 19, 2024      Take 1 tablet (50 mg) by mouth once daily.       melatonin 3 mg tablet      Take 1 tablet (3 mg) by mouth as needed at bedtime for sleep.       pantoprazole 40 mg EC tablet  Commonly known as: ProtoNix  Start taking on: April 19, 2024      Take 1 tablet (40 mg) by mouth once daily in the morning. Take before meals. Do not crush, chew, or split.              CONTINUE taking these medications        Instructions Last Dose Given Next Dose Due   allopurinol 100 mg tablet  Commonly known as: Zyloprim           calcium carbonate-vitamin D3 500 mg-5 mcg (200 unit) tablet                  STOP taking these medications      losartan-hydrochlorothiazide 100-12.5 mg tablet  Commonly known as: Hyzaar        losartan-hydrochlorothiazide 50-12.5 mg tablet  Commonly known as: Hyzaar        metFORMIN 500 mg tablet  Commonly known as: Glucophage        metoprolol succinate  mg 24 hr tablet  Commonly known as: Toprol-XL        warfarin 5 mg tablet  Commonly known as: Coumadin                  Where to Get Your  Medications        Information about where to get these medications is not yet available    Ask your nurse or doctor about these medications  cefTRIAXone 1 gram/50 mL IVPB  DOPamine 1,600 mcg/mL infusion  insulin lispro 100 unit/mL injection  losartan 50 mg tablet  melatonin 3 mg tablet  pantoprazole 40 mg EC tablet         Test Results Pending At Discharge  Pending Labs       Order Current Status    Extra Urine Gray Tube Collected (04/17/24 1597)    Urinalysis with Reflex Culture and Microscopic In process    Urine Culture In process    Urine culture In process            Hospital Course  Patient admitted with poorly described symptoms of anxiety for which she got an ambulance found to be in heart failure with very slow ventricular rate in the setting of chronic A-fib on metoprolol.  She was admitted initially with the plan to stop her metoprolol will improve her heart rates but this did not happen electrophysiology evaluated the patient recommended pacemaker she is being transferred to [facility] for that purpose.  I signed out to Dr. Hinds hospitalist.  Awaiting bed for transfer now    Pertinent Physical Exam At Time of Discharge  See my note from earlier today    Outpatient Follow-Up  To be determined    Time spent on discharge arrangement:  45 minutes    Sergo Medel MD

## 2024-04-18 NOTE — NURSING NOTE
Pt resting in bed EKG completed as ordered Hr in the 40's at this time ,dopamine drip running as ordered

## 2024-04-18 NOTE — PROGRESS NOTES
Music Therapy Note    Audelia Virgen   Therapy Session  Referral Type: New referral this admission  Visit Type: New visit  Session Start Time: 1340  Intervention Delivery: In-person  Conflict of Service: Working with other staff               Treatment/Interventions            Narrative  Follow-up: MT will follow-up if Pt remains admitted.    Education Documentation  No documentation found.

## 2024-04-19 ENCOUNTER — HOSPITAL ENCOUNTER (INPATIENT)
Facility: HOSPITAL | Age: 89
LOS: 3 days | Discharge: HOME | DRG: 242 | End: 2024-04-22
Attending: INTERNAL MEDICINE | Admitting: INTERNAL MEDICINE
Payer: MEDICARE

## 2024-04-19 ENCOUNTER — HOSPITAL ENCOUNTER (OUTPATIENT)
Dept: CARDIOLOGY | Facility: HOSPITAL | Age: 89
Discharge: HOME | End: 2024-04-19
Payer: MEDICARE

## 2024-04-19 VITALS
TEMPERATURE: 98.1 F | BODY MASS INDEX: 23.45 KG/M2 | SYSTOLIC BLOOD PRESSURE: 195 MMHG | RESPIRATION RATE: 17 BRPM | HEART RATE: 62 BPM | WEIGHT: 137.35 LBS | DIASTOLIC BLOOD PRESSURE: 75 MMHG | HEIGHT: 64 IN | OXYGEN SATURATION: 99 %

## 2024-04-19 DIAGNOSIS — R29.898 MUSCULAR DECONDITIONING: ICD-10-CM

## 2024-04-19 DIAGNOSIS — I48.91 ATRIAL FIBRILLATION WITH SLOW VENTRICULAR RESPONSE (MULTI): Primary | ICD-10-CM

## 2024-04-19 DIAGNOSIS — H10.509 BLEPHAROCONJUNCTIVITIS, UNSPECIFIED BLEPHAROCONJUNCTIVITIS TYPE, UNSPECIFIED LATERALITY: ICD-10-CM

## 2024-04-19 DIAGNOSIS — R00.1 BRADYCARDIA: ICD-10-CM

## 2024-04-19 DIAGNOSIS — I48.91 ATRIAL FIBRILLATION BY ELECTROCARDIOGRAM (MULTI): ICD-10-CM

## 2024-04-19 LAB
ALBUMIN SERPL-MCNC: 3.5 G/DL (ref 3.5–5)
ALP BLD-CCNC: 68 U/L (ref 35–125)
ALT SERPL-CCNC: 15 U/L (ref 5–40)
ANION GAP SERPL CALC-SCNC: 6 MMOL/L
AST SERPL-CCNC: 18 U/L (ref 5–40)
ATRIAL RATE: 340 BPM
ATRIAL RATE: 35 BPM
ATRIAL RATE: 36 BPM
BASOPHILS # BLD AUTO: 0.03 X10*3/UL (ref 0–0.1)
BASOPHILS NFR BLD AUTO: 0.4 %
BILIRUB SERPL-MCNC: 0.7 MG/DL (ref 0.1–1.2)
BUN SERPL-MCNC: 29 MG/DL (ref 8–25)
CALCIUM SERPL-MCNC: 9 MG/DL (ref 8.5–10.4)
CHLORIDE SERPL-SCNC: 105 MMOL/L (ref 97–107)
CO2 SERPL-SCNC: 28 MMOL/L (ref 24–31)
CREAT SERPL-MCNC: 1 MG/DL (ref 0.4–1.6)
EGFRCR SERPLBLD CKD-EPI 2021: 52 ML/MIN/1.73M*2
EOSINOPHIL # BLD AUTO: 0.16 X10*3/UL (ref 0–0.4)
EOSINOPHIL NFR BLD AUTO: 2.2 %
ERYTHROCYTE [DISTWIDTH] IN BLOOD BY AUTOMATED COUNT: 14.2 % (ref 11.5–14.5)
GLUCOSE BLD MANUAL STRIP-MCNC: 105 MG/DL (ref 74–99)
GLUCOSE BLD MANUAL STRIP-MCNC: 99 MG/DL (ref 74–99)
GLUCOSE SERPL-MCNC: 107 MG/DL (ref 65–99)
HCT VFR BLD AUTO: 35.5 % (ref 36–46)
HGB BLD-MCNC: 11.5 G/DL (ref 12–16)
IMM GRANULOCYTES # BLD AUTO: 0.02 X10*3/UL (ref 0–0.5)
IMM GRANULOCYTES NFR BLD AUTO: 0.3 % (ref 0–0.9)
INR PPP: 2.2 (ref 0.9–1.2)
LYMPHOCYTES # BLD AUTO: 1.97 X10*3/UL (ref 0.8–3)
LYMPHOCYTES NFR BLD AUTO: 26.9 %
MCH RBC QN AUTO: 31.3 PG (ref 26–34)
MCHC RBC AUTO-ENTMCNC: 32.4 G/DL (ref 32–36)
MCV RBC AUTO: 97 FL (ref 80–100)
MONOCYTES # BLD AUTO: 0.77 X10*3/UL (ref 0.05–0.8)
MONOCYTES NFR BLD AUTO: 10.5 %
NEUTROPHILS # BLD AUTO: 4.37 X10*3/UL (ref 1.6–5.5)
NEUTROPHILS NFR BLD AUTO: 59.7 %
NRBC BLD-RTO: 0 /100 WBCS (ref 0–0)
PLATELET # BLD AUTO: 162 X10*3/UL (ref 150–450)
POTASSIUM SERPL-SCNC: 3.7 MMOL/L (ref 3.4–5.1)
PROT SERPL-MCNC: 6.5 G/DL (ref 5.9–7.9)
PROTHROMBIN TIME: 22 SECONDS (ref 9.3–12.7)
Q ONSET: 205 MS
Q ONSET: 211 MS
Q ONSET: 212 MS
QRS COUNT: 6 BEATS
QRS COUNT: 8 BEATS
QRS COUNT: 9 BEATS
QRS DURATION: 102 MS
QRS DURATION: 102 MS
QRS DURATION: 104 MS
QT INTERVAL: 470 MS
QT INTERVAL: 492 MS
QT INTERVAL: 544 MS
QTC CALCULATION(BAZETT): 383 MS
QTC CALCULATION(BAZETT): 466 MS
QTC CALCULATION(BAZETT): 505 MS
QTC FREDERICIA: 410 MS
QTC FREDERICIA: 474 MS
QTC FREDERICIA: 518 MS
R AXIS: -46 DEGREES
R AXIS: -53 DEGREES
R AXIS: -53 DEGREES
RBC # BLD AUTO: 3.67 X10*6/UL (ref 4–5.2)
SODIUM SERPL-SCNC: 139 MMOL/L (ref 133–145)
T AXIS: 120 DEGREES
T AXIS: 266 DEGREES
T AXIS: 97 DEGREES
T OFFSET: 440 MS
T OFFSET: 458 MS
T OFFSET: 483 MS
VENTRICULAR RATE: 40 BPM
VENTRICULAR RATE: 52 BPM
VENTRICULAR RATE: 54 BPM
WBC # BLD AUTO: 7.3 X10*3/UL (ref 4.4–11.3)

## 2024-04-19 PROCEDURE — 84075 ASSAY ALKALINE PHOSPHATASE: CPT | Performed by: INTERNAL MEDICINE

## 2024-04-19 PROCEDURE — 2500000001 HC RX 250 WO HCPCS SELF ADMINISTERED DRUGS (ALT 637 FOR MEDICARE OP): Performed by: INTERNAL MEDICINE

## 2024-04-19 PROCEDURE — 33207 INSERT HEART PM VENTRICULAR: CPT | Performed by: STUDENT IN AN ORGANIZED HEALTH CARE EDUCATION/TRAINING PROGRAM

## 2024-04-19 PROCEDURE — 2720000007 HC OR 272 NO HCPCS: Performed by: STUDENT IN AN ORGANIZED HEALTH CARE EDUCATION/TRAINING PROGRAM

## 2024-04-19 PROCEDURE — 93010 ELECTROCARDIOGRAM REPORT: CPT | Performed by: INTERNAL MEDICINE

## 2024-04-19 PROCEDURE — C1892 INTRO/SHEATH,FIXED,PEEL-AWAY: HCPCS | Performed by: STUDENT IN AN ORGANIZED HEALTH CARE EDUCATION/TRAINING PROGRAM

## 2024-04-19 PROCEDURE — 82947 ASSAY GLUCOSE BLOOD QUANT: CPT

## 2024-04-19 PROCEDURE — 99152 MOD SED SAME PHYS/QHP 5/>YRS: CPT | Performed by: STUDENT IN AN ORGANIZED HEALTH CARE EDUCATION/TRAINING PROGRAM

## 2024-04-19 PROCEDURE — 2060000001 HC INTERMEDIATE ICU ROOM DAILY

## 2024-04-19 PROCEDURE — 82947 ASSAY GLUCOSE BLOOD QUANT: CPT | Mod: 91

## 2024-04-19 PROCEDURE — 36415 COLL VENOUS BLD VENIPUNCTURE: CPT | Performed by: INTERNAL MEDICINE

## 2024-04-19 PROCEDURE — 2780000003 HC OR 278 NO HCPCS: Performed by: STUDENT IN AN ORGANIZED HEALTH CARE EDUCATION/TRAINING PROGRAM

## 2024-04-19 PROCEDURE — 85610 PROTHROMBIN TIME: CPT | Performed by: INTERNAL MEDICINE

## 2024-04-19 PROCEDURE — 99153 MOD SED SAME PHYS/QHP EA: CPT | Performed by: STUDENT IN AN ORGANIZED HEALTH CARE EDUCATION/TRAINING PROGRAM

## 2024-04-19 PROCEDURE — C1889 IMPLANT/INSERT DEVICE, NOC: HCPCS | Performed by: STUDENT IN AN ORGANIZED HEALTH CARE EDUCATION/TRAINING PROGRAM

## 2024-04-19 PROCEDURE — 85025 COMPLETE CBC W/AUTO DIFF WBC: CPT | Performed by: INTERNAL MEDICINE

## 2024-04-19 PROCEDURE — 2500000006 HC RX 250 W HCPCS SELF ADMINISTERED DRUGS (ALT 637 FOR ALL PAYERS): Mod: MUE | Performed by: REGISTERED NURSE

## 2024-04-19 PROCEDURE — 93005 ELECTROCARDIOGRAM TRACING: CPT

## 2024-04-19 PROCEDURE — C1769 GUIDE WIRE: HCPCS | Performed by: STUDENT IN AN ORGANIZED HEALTH CARE EDUCATION/TRAINING PROGRAM

## 2024-04-19 PROCEDURE — 2500000004 HC RX 250 GENERAL PHARMACY W/ HCPCS (ALT 636 FOR OP/ED): Performed by: STUDENT IN AN ORGANIZED HEALTH CARE EDUCATION/TRAINING PROGRAM

## 2024-04-19 PROCEDURE — 02HK3JZ INSERTION OF PACEMAKER LEAD INTO RIGHT VENTRICLE, PERCUTANEOUS APPROACH: ICD-10-PCS | Performed by: STUDENT IN AN ORGANIZED HEALTH CARE EDUCATION/TRAINING PROGRAM

## 2024-04-19 PROCEDURE — C1898 LEAD, PMKR, OTHER THAN TRANS: HCPCS | Performed by: STUDENT IN AN ORGANIZED HEALTH CARE EDUCATION/TRAINING PROGRAM

## 2024-04-19 PROCEDURE — 0JH604Z INSERTION OF PACEMAKER, SINGLE CHAMBER INTO CHEST SUBCUTANEOUS TISSUE AND FASCIA, OPEN APPROACH: ICD-10-PCS | Performed by: STUDENT IN AN ORGANIZED HEALTH CARE EDUCATION/TRAINING PROGRAM

## 2024-04-19 PROCEDURE — 2500000006 HC RX 250 W HCPCS SELF ADMINISTERED DRUGS (ALT 637 FOR ALL PAYERS): Performed by: REGISTERED NURSE

## 2024-04-19 DEVICE — LEAD 5076-52 CAPSUREFIX NOVUS US EN
Type: IMPLANTABLE DEVICE | Site: CHEST | Status: FUNCTIONAL
Brand: CAPSUREFIX® NOVUS

## 2024-04-19 DEVICE — IPG W3SR01 AZURE S SR MRI WL USA BCP
Type: IMPLANTABLE DEVICE | Site: CHEST | Status: FUNCTIONAL
Brand: AZURE™ S SR MRI SURESCAN™

## 2024-04-19 RX ORDER — DEXTROSE 50 % IN WATER (D50W) INTRAVENOUS SYRINGE
25
Status: DISCONTINUED | OUTPATIENT
Start: 2024-04-19 | End: 2024-04-22 | Stop reason: HOSPADM

## 2024-04-19 RX ORDER — PANTOPRAZOLE SODIUM 40 MG/10ML
40 INJECTION, POWDER, LYOPHILIZED, FOR SOLUTION INTRAVENOUS
Status: DISCONTINUED | OUTPATIENT
Start: 2024-04-20 | End: 2024-04-22 | Stop reason: HOSPADM

## 2024-04-19 RX ORDER — TALC
3 POWDER (GRAM) TOPICAL NIGHTLY PRN
Status: DISCONTINUED | OUTPATIENT
Start: 2024-04-19 | End: 2024-04-22 | Stop reason: HOSPADM

## 2024-04-19 RX ORDER — ACETAMINOPHEN 160 MG/5ML
650 SOLUTION ORAL EVERY 4 HOURS PRN
Status: DISCONTINUED | OUTPATIENT
Start: 2024-04-19 | End: 2024-04-22 | Stop reason: HOSPADM

## 2024-04-19 RX ORDER — MIDAZOLAM HYDROCHLORIDE 1 MG/ML
INJECTION, SOLUTION INTRAMUSCULAR; INTRAVENOUS AS NEEDED
Status: DISCONTINUED | OUTPATIENT
Start: 2024-04-19 | End: 2024-04-19 | Stop reason: HOSPADM

## 2024-04-19 RX ORDER — TALC
3 POWDER (GRAM) TOPICAL NIGHTLY PRN
Status: DISCONTINUED | OUTPATIENT
Start: 2024-04-19 | End: 2024-04-19

## 2024-04-19 RX ORDER — FERROUS SULFATE, DRIED 160(50) MG
1 TABLET, EXTENDED RELEASE ORAL DAILY
Status: DISCONTINUED | OUTPATIENT
Start: 2024-04-19 | End: 2024-04-22 | Stop reason: HOSPADM

## 2024-04-19 RX ORDER — METFORMIN HYDROCHLORIDE EXTENDED-RELEASE TABLETS 500 MG/1
500 TABLET, FILM COATED, EXTENDED RELEASE ORAL
COMMUNITY

## 2024-04-19 RX ORDER — ACETAMINOPHEN 325 MG/1
650 TABLET ORAL EVERY 4 HOURS PRN
Status: DISCONTINUED | OUTPATIENT
Start: 2024-04-19 | End: 2024-04-22 | Stop reason: HOSPADM

## 2024-04-19 RX ORDER — WARFARIN SODIUM 5 MG/1
5 TABLET ORAL
COMMUNITY

## 2024-04-19 RX ORDER — INSULIN LISPRO 100 [IU]/ML
0-5 INJECTION, SOLUTION INTRAVENOUS; SUBCUTANEOUS
Status: DISCONTINUED | OUTPATIENT
Start: 2024-04-19 | End: 2024-04-22 | Stop reason: HOSPADM

## 2024-04-19 RX ORDER — DEXTROSE 50 % IN WATER (D50W) INTRAVENOUS SYRINGE
12.5
Status: DISCONTINUED | OUTPATIENT
Start: 2024-04-19 | End: 2024-04-22 | Stop reason: HOSPADM

## 2024-04-19 RX ORDER — HYDRALAZINE HYDROCHLORIDE 20 MG/ML
INJECTION INTRAMUSCULAR; INTRAVENOUS AS NEEDED
Status: DISCONTINUED | OUTPATIENT
Start: 2024-04-19 | End: 2024-04-19 | Stop reason: HOSPADM

## 2024-04-19 RX ORDER — CEFTRIAXONE 1 G/50ML
1 INJECTION, SOLUTION INTRAVENOUS EVERY 24 HOURS
Status: DISCONTINUED | OUTPATIENT
Start: 2024-04-19 | End: 2024-04-22 | Stop reason: HOSPADM

## 2024-04-19 RX ORDER — POLYETHYLENE GLYCOL 3350 17 G/17G
17 POWDER, FOR SOLUTION ORAL DAILY
Status: DISCONTINUED | OUTPATIENT
Start: 2024-04-19 | End: 2024-04-22 | Stop reason: HOSPADM

## 2024-04-19 RX ORDER — ACETAMINOPHEN 650 MG/1
650 SUPPOSITORY RECTAL EVERY 4 HOURS PRN
Status: DISCONTINUED | OUTPATIENT
Start: 2024-04-19 | End: 2024-04-22 | Stop reason: HOSPADM

## 2024-04-19 RX ORDER — PANTOPRAZOLE SODIUM 40 MG/1
40 TABLET, DELAYED RELEASE ORAL
Status: DISCONTINUED | OUTPATIENT
Start: 2024-04-20 | End: 2024-04-22 | Stop reason: HOSPADM

## 2024-04-19 RX ORDER — PANTOPRAZOLE SODIUM 40 MG/1
40 TABLET, DELAYED RELEASE ORAL
Status: DISCONTINUED | OUTPATIENT
Start: 2024-04-20 | End: 2024-04-19

## 2024-04-19 RX ORDER — ONDANSETRON HYDROCHLORIDE 2 MG/ML
4 INJECTION, SOLUTION INTRAVENOUS EVERY 8 HOURS PRN
Status: DISCONTINUED | OUTPATIENT
Start: 2024-04-19 | End: 2024-04-22 | Stop reason: HOSPADM

## 2024-04-19 RX ORDER — BUPIVACAINE HYDROCHLORIDE 2.5 MG/ML
INJECTION, SOLUTION EPIDURAL; INFILTRATION; INTRACAUDAL AS NEEDED
Status: DISCONTINUED | OUTPATIENT
Start: 2024-04-19 | End: 2024-04-19 | Stop reason: HOSPADM

## 2024-04-19 RX ORDER — PHYTONADIONE 5 MG/1
5 TABLET ORAL ONCE
Status: COMPLETED | OUTPATIENT
Start: 2024-04-19 | End: 2024-04-19

## 2024-04-19 RX ORDER — ONDANSETRON 4 MG/1
4 TABLET, FILM COATED ORAL EVERY 8 HOURS PRN
Status: DISCONTINUED | OUTPATIENT
Start: 2024-04-19 | End: 2024-04-22 | Stop reason: HOSPADM

## 2024-04-19 RX ORDER — LOSARTAN POTASSIUM 50 MG/1
50 TABLET ORAL DAILY
Status: DISCONTINUED | OUTPATIENT
Start: 2024-04-19 | End: 2024-04-21

## 2024-04-19 RX ORDER — ALLOPURINOL 100 MG/1
100 TABLET ORAL DAILY
Status: DISCONTINUED | OUTPATIENT
Start: 2024-04-19 | End: 2024-04-22 | Stop reason: HOSPADM

## 2024-04-19 RX ORDER — FENTANYL CITRATE 50 UG/ML
INJECTION, SOLUTION INTRAMUSCULAR; INTRAVENOUS AS NEEDED
Status: DISCONTINUED | OUTPATIENT
Start: 2024-04-19 | End: 2024-04-19 | Stop reason: HOSPADM

## 2024-04-19 RX ADMIN — PHYTONADIONE 5 MG: 5 TABLET ORAL at 09:41

## 2024-04-19 RX ADMIN — ACETAMINOPHEN 650 MG: 325 TABLET ORAL at 19:51

## 2024-04-19 RX ADMIN — ALLOPURINOL 100 MG: 100 TABLET ORAL at 20:18

## 2024-04-19 SDOH — SOCIAL STABILITY: SOCIAL INSECURITY: HAS ANYONE EVER THREATENED TO HURT YOUR FAMILY OR YOUR PETS?: NO

## 2024-04-19 SDOH — ECONOMIC STABILITY: INCOME INSECURITY: HOW HARD IS IT FOR YOU TO PAY FOR THE VERY BASICS LIKE FOOD, HOUSING, MEDICAL CARE, AND HEATING?: NOT HARD AT ALL

## 2024-04-19 SDOH — ECONOMIC STABILITY: FOOD INSECURITY: WITHIN THE PAST 12 MONTHS, YOU WORRIED THAT YOUR FOOD WOULD RUN OUT BEFORE YOU GOT MONEY TO BUY MORE.: NEVER TRUE

## 2024-04-19 SDOH — SOCIAL STABILITY: SOCIAL INSECURITY: WITHIN THE LAST YEAR, HAVE YOU BEEN HUMILIATED OR EMOTIONALLY ABUSED IN OTHER WAYS BY YOUR PARTNER OR EX-PARTNER?: NO

## 2024-04-19 SDOH — SOCIAL STABILITY: SOCIAL INSECURITY: WITHIN THE LAST YEAR, HAVE YOU BEEN AFRAID OF YOUR PARTNER OR EX-PARTNER?: NO

## 2024-04-19 SDOH — SOCIAL STABILITY: SOCIAL INSECURITY: DO YOU FEEL ANYONE HAS EXPLOITED OR TAKEN ADVANTAGE OF YOU FINANCIALLY OR OF YOUR PERSONAL PROPERTY?: NO

## 2024-04-19 SDOH — SOCIAL STABILITY: SOCIAL INSECURITY: WERE YOU ABLE TO COMPLETE ALL THE BEHAVIORAL HEALTH SCREENINGS?: YES

## 2024-04-19 SDOH — SOCIAL STABILITY: SOCIAL INSECURITY: ABUSE: ADULT

## 2024-04-19 SDOH — SOCIAL STABILITY: SOCIAL INSECURITY: HAVE YOU HAD ANY THOUGHTS OF HARMING ANYONE ELSE?: NO

## 2024-04-19 SDOH — SOCIAL STABILITY: SOCIAL NETWORK: ARE YOU MARRIED, WIDOWED, DIVORCED, SEPARATED, NEVER MARRIED, OR LIVING WITH A PARTNER?: WIDOWED

## 2024-04-19 SDOH — HEALTH STABILITY: PHYSICAL HEALTH: ON AVERAGE, HOW MANY DAYS PER WEEK DO YOU ENGAGE IN MODERATE TO STRENUOUS EXERCISE (LIKE A BRISK WALK)?: 7 DAYS

## 2024-04-19 SDOH — SOCIAL STABILITY: SOCIAL NETWORK: HOW OFTEN DO YOU GET TOGETHER WITH FRIENDS OR RELATIVES?: MORE THAN THREE TIMES A WEEK

## 2024-04-19 SDOH — ECONOMIC STABILITY: INCOME INSECURITY: IN THE PAST 12 MONTHS, HAS THE ELECTRIC, GAS, OIL, OR WATER COMPANY THREATENED TO SHUT OFF SERVICE IN YOUR HOME?: NO

## 2024-04-19 SDOH — ECONOMIC STABILITY: FOOD INSECURITY: WITHIN THE PAST 12 MONTHS, THE FOOD YOU BOUGHT JUST DIDN'T LAST AND YOU DIDN'T HAVE MONEY TO GET MORE.: NEVER TRUE

## 2024-04-19 SDOH — ECONOMIC STABILITY: INCOME INSECURITY: IN THE LAST 12 MONTHS, WAS THERE A TIME WHEN YOU WERE NOT ABLE TO PAY THE MORTGAGE OR RENT ON TIME?: NO

## 2024-04-19 SDOH — ECONOMIC STABILITY: HOUSING INSECURITY: IN THE LAST 12 MONTHS, HOW MANY PLACES HAVE YOU LIVED?: 1

## 2024-04-19 SDOH — SOCIAL STABILITY: SOCIAL NETWORK: HOW OFTEN DO YOU ATTENT MEETINGS OF THE CLUB OR ORGANIZATION YOU BELONG TO?: MORE THAN 4 TIMES PER YEAR

## 2024-04-19 SDOH — SOCIAL STABILITY: SOCIAL INSECURITY: ARE YOU OR HAVE YOU BEEN THREATENED OR ABUSED PHYSICALLY, EMOTIONALLY, OR SEXUALLY BY ANYONE?: NO

## 2024-04-19 SDOH — SOCIAL STABILITY: SOCIAL INSECURITY: DO YOU FEEL UNSAFE GOING BACK TO THE PLACE WHERE YOU ARE LIVING?: NO

## 2024-04-19 SDOH — SOCIAL STABILITY: SOCIAL INSECURITY: DOES ANYONE TRY TO KEEP YOU FROM HAVING/CONTACTING OTHER FRIENDS OR DOING THINGS OUTSIDE YOUR HOME?: NO

## 2024-04-19 SDOH — SOCIAL STABILITY: SOCIAL INSECURITY: ARE THERE ANY APPARENT SIGNS OF INJURIES/BEHAVIORS THAT COULD BE RELATED TO ABUSE/NEGLECT?: NO

## 2024-04-19 SDOH — SOCIAL STABILITY: SOCIAL INSECURITY: HAVE YOU HAD THOUGHTS OF HARMING ANYONE ELSE?: NO

## 2024-04-19 SDOH — SOCIAL STABILITY: SOCIAL NETWORK: HOW OFTEN DO YOU ATTEND CHURCH OR RELIGIOUS SERVICES?: NEVER

## 2024-04-19 SDOH — HEALTH STABILITY: PHYSICAL HEALTH: ON AVERAGE, HOW MANY MINUTES DO YOU ENGAGE IN EXERCISE AT THIS LEVEL?: 30 MIN

## 2024-04-19 ASSESSMENT — COGNITIVE AND FUNCTIONAL STATUS - GENERAL
DAILY ACTIVITIY SCORE: 19
MOVING FROM LYING ON BACK TO SITTING ON SIDE OF FLAT BED WITH BEDRAILS: A LITTLE
CLIMB 3 TO 5 STEPS WITH RAILING: A LOT
WALKING IN HOSPITAL ROOM: A LITTLE
TURNING FROM BACK TO SIDE WHILE IN FLAT BAD: A LITTLE
STANDING UP FROM CHAIR USING ARMS: A LITTLE
HELP NEEDED FOR BATHING: A LITTLE
MOVING TO AND FROM BED TO CHAIR: A LITTLE
MOBILITY SCORE: 17
TOILETING: A LITTLE
MOBILITY SCORE: 22
WALKING IN HOSPITAL ROOM: A LITTLE
PERSONAL GROOMING: A LITTLE
PATIENT BASELINE BEDBOUND: NO
DAILY ACTIVITIY SCORE: 24
DRESSING REGULAR LOWER BODY CLOTHING: A LITTLE
CLIMB 3 TO 5 STEPS WITH RAILING: A LITTLE
DRESSING REGULAR UPPER BODY CLOTHING: A LITTLE

## 2024-04-19 ASSESSMENT — COLUMBIA-SUICIDE SEVERITY RATING SCALE - C-SSRS
6. HAVE YOU EVER DONE ANYTHING, STARTED TO DO ANYTHING, OR PREPARED TO DO ANYTHING TO END YOUR LIFE?: NO
1. IN THE PAST MONTH, HAVE YOU WISHED YOU WERE DEAD OR WISHED YOU COULD GO TO SLEEP AND NOT WAKE UP?: NO
2. HAVE YOU ACTUALLY HAD ANY THOUGHTS OF KILLING YOURSELF?: NO

## 2024-04-19 ASSESSMENT — ACTIVITIES OF DAILY LIVING (ADL)
JUDGMENT_ADEQUATE_SAFELY_COMPLETE_DAILY_ACTIVITIES: YES
DRESSING YOURSELF: INDEPENDENT
TOILETING: INDEPENDENT
WALKS IN HOME: INDEPENDENT
BATHING: INDEPENDENT
HEARING - RIGHT EAR: HEARING AID
WALKS IN HOME: INDEPENDENT
LACK_OF_TRANSPORTATION: NO
FEEDING YOURSELF: INDEPENDENT
TOILETING: INDEPENDENT
HEARING - LEFT EAR: HEARING AID
HEARING - RIGHT EAR: HEARING AID
JUDGMENT_ADEQUATE_SAFELY_COMPLETE_DAILY_ACTIVITIES: YES
ADEQUATE_TO_COMPLETE_ADL: YES
GROOMING: INDEPENDENT
ADEQUATE_TO_COMPLETE_ADL: YES
GROOMING: INDEPENDENT
HEARING - LEFT EAR: HEARING AID
BATHING: INDEPENDENT
DRESSING YOURSELF: INDEPENDENT
PATIENT'S MEMORY ADEQUATE TO SAFELY COMPLETE DAILY ACTIVITIES?: YES
PATIENT'S MEMORY ADEQUATE TO SAFELY COMPLETE DAILY ACTIVITIES?: YES

## 2024-04-19 ASSESSMENT — PAIN DESCRIPTION - ORIENTATION: ORIENTATION: RIGHT;LEFT

## 2024-04-19 ASSESSMENT — LIFESTYLE VARIABLES
SUBSTANCE_ABUSE_PAST_12_MONTHS: NO
HOW OFTEN DO YOU HAVE 6 OR MORE DRINKS ON ONE OCCASION: NEVER
PRESCIPTION_ABUSE_PAST_12_MONTHS: NO
AUDIT-C TOTAL SCORE: 0
HOW MANY STANDARD DRINKS CONTAINING ALCOHOL DO YOU HAVE ON A TYPICAL DAY: PATIENT DOES NOT DRINK
SKIP TO QUESTIONS 9-10: 1
HOW OFTEN DO YOU HAVE A DRINK CONTAINING ALCOHOL: NEVER
AUDIT-C TOTAL SCORE: 0

## 2024-04-19 ASSESSMENT — PAIN - FUNCTIONAL ASSESSMENT
PAIN_FUNCTIONAL_ASSESSMENT: 0-10

## 2024-04-19 ASSESSMENT — PATIENT HEALTH QUESTIONNAIRE - PHQ9
2. FEELING DOWN, DEPRESSED OR HOPELESS: NOT AT ALL
1. LITTLE INTEREST OR PLEASURE IN DOING THINGS: NOT AT ALL
SUM OF ALL RESPONSES TO PHQ9 QUESTIONS 1 & 2: 0

## 2024-04-19 ASSESSMENT — PAIN SCALES - GENERAL
PAINLEVEL_OUTOF10: 2
PAINLEVEL_OUTOF10: 0 - NO PAIN
PAINLEVEL_OUTOF10: 0 - NO PAIN
PAINLEVEL_OUTOF10: 8
PAINLEVEL_OUTOF10: 0 - NO PAIN
PAINLEVEL_OUTOF10: 0 - NO PAIN

## 2024-04-19 ASSESSMENT — PAIN DESCRIPTION - LOCATION: LOCATION: LEG

## 2024-04-19 NOTE — POST-PROCEDURE NOTE
Physician Transition of Care Summary  Invasive Cardiovascular Lab    Procedure Date: 4/19/2024  Attending:    * Charito He - Primary  Resident/Fellow/Other Assistant: Surgeons and Role:  * No surgeons found with a matching role *    Indications:   Pre-op Diagnosis     * Atrial fibrillation with slow ventricular response (Multi) [I48.91]     * Bradycardia [R00.1]    Post-procedure diagnosis:   Post-op Diagnosis     * Atrial fibrillation with slow ventricular response (Multi) [I48.91]     * Bradycardia [R00.1]    Procedure(s):   PPM SINGLE IMPLANT  65758 - MN INS PM PLS GEN W/EXIST SINGLE LEAD    MN INS NEW/RPLC PRM PACEMAKER W/TRANSV ELTRD VENTR [87777]  MN MOD SED SAME PHYS/QHP INITIAL 15 MINS 5/> YRS [24977]  MN MOD SED SAME PHYS/QHP EACH ADDL 15 MINS [76137]    Procedure Findings:   See below    Description of the Procedure:     Single chamber pacemaker implantation (right side)    Patient history:  Please refer to the detailed history and physical on the patient's medical chart.     Procedure narrative:  The patient was in the fasting state. The patient was set up for continuous monitoring of surface 12 lead ECG and pulse oximetry. Blood pressure was monitored. The procedure was performed under IV conscious sedation supplemented with intermittent moderate sedation. The Right upper chest was prepped and draped in the usual sterile fashion. Local anesthesia: After preoperative IV antibiotic was completely infused, subcutaneous tissues just medial to the Right deltopectoral area, were infiltrated with  Bupivacaine  for local anesthesia.   Using a #15 scalpel, an incision was made, which was extended to the right prepectoral fascia using blunt dissection. A pulse generator pocket was created.  The Right cephalic vein was identified, the distal end was tied off, and using Rhoades scissors, a venotomy was created. A wire (0.035 Glidewire) was advanced through the venotomy.   A 7F sheath was placed over of the  guidewires. The RV pacing lead was then advanced to the heart via fluoroscopic guidance. The ventricle was mapped, and the lead was fixed to the right ventricular apex. Lead was re-positioned 3 more time(s) to get optimal lead parameters for its final location. After lead placement, appropriate sensing and thresholds were obtained. The sheath was peeled away.  The lead was sutured in place to the pectoralis muscle using x2 ties. Lead measurements were obtained.  A single chamber pacemaker pulse generator was attached to the lead and implanted.  The device was interrogated and its parameters recorded; telemetered electrograms and pacing and sensing thresholds were measured.   The wound was closed in two layers using #0 and #3-0 Ethibond. The skin was approximated with subcuticular suture and skin adhesive. Steri-strips were applied, and the incision covered with a sterile dressing. Manual pressure was applied.    Final Implant Settings:  : Medtronic  Device: Single chamber pacemaker   Lead Parameters  RVent: 665ohms, R wave 6.0mV, threshold 0.5V@0.4msec      Summary:  Successful implantation of a Medtronic right sided Single chamber pacemaker    Recommendations:    A PA-lateral chest X-ray should be performed and telemetry monitoring continued until discharge.      Patient Instructions:  Please do not lift left arm above shoulder level for 4-5 weeks  No repetitive motion or lifting heavy weight for 4-5 weeks  No driving, alcohol or making legal decisions for 24 hours.  Keep wound completely dry for 7 days; may shower but keep bandage as dry as possible.  No soaking in hot tubs or baths for 10 days. May sponge bath  Keep bandage on incision until seen in the office in 1 week.  Allow steristrips underneath to fall off naturally.    Please call our office if you notice any discharge or swelling around incision or fever.    Follow up:   The patient should be alert for bleeding, swelling, or signs of  infection. The patient should call the electrophysiologist immediately if symptoms recur, or for any problems. The patient and family (with HIPPA consent)  have been instructed accordingly.   Follow up in Clinic for wound check.     See complete procedural log and parameters.     Complications:   None    Stents/Implants:   Implants       Pacemaker    Lead, Capsurefix Novus, 52 Cm - [id number] - Implanted        Inventory item: LEAD, CAPSUREFIX NOVUS, 52 CM Model/Cat number: 5076-52    Serial number: [id number] : [employer]    Lot number: [id number] Device identifier: [id number]    Implant Date: 4/19/2024        As of 4/19/2024       Status: Implanted                      Type Not Specified    [address] S[employer]can Mri Implantable Pulse Generator - Implanted        Model/Cat number: W3SR01 Serial number: [id number]    Lot number: [id number] Device identifier: [id number]      As of 4/19/2024       Status: Implanted                              Estimated Blood Loss:   30 mL    Anesthesia: Moderate Sedation Anesthesia Staff: No anesthesia staff entered.    Any Specimen(s) Removed:   No specimens collected during this procedure.        Electronically signed by: Charito Rubio MD, 4/19/2024 7:17 PM

## 2024-04-19 NOTE — PROGRESS NOTES
Occupational Therapy    OT Treatment    Patient Name: Audelia Virgen  MRN: [id number]  Today's Date: 4/19/2024       General:  General  Missed Visit: Yes  Missed Visit Reason: Cancel  General Comment: Awaiting transfer to [facility] for possible pacer placement

## 2024-04-19 NOTE — H&P
History Of Present Illness  Audelia Virgen is a 89 y.o. female who presented to [facility] complaining of not feeling well.  Patient is a 89 years old  female past medical history of atrial fibrillation, diabetes mellitus type 2, hypertension, on Coumadin.  8 [facility] ER patient was found to be bradycardic in mild pulmonary edema patient has been on metoprolol.  She was given atropine and Lasix.  Dr. Pickett recommended to be admitted and hold metoprolol.  Patient was admitted at [facility] initially.    Her heart her heart rate was fluctuating between 30 and 40.  She was started on dopamine drip.  Dr. Mason was contacted from electrophysiology services.   He recommended patient to get transferred to [facility] for possible pacemaker implant .  Patient was transferred to [facility] today.  He was evaluated by electrophysiology today and she was scheduled to have pacemaker implant today.  Patient denied chest pain but complained of feeling weak.  Metoprolol has been on hold for 2 days.  Patient remain on A-fib.  She has been on Coumadin .Coumadin was discontinued.  Patient was found to have urinary tract infection started on ceftriaxone.  She has diabetes mellitus type 2 on metformin.  Patient denied to have any change in her weight or her appetite.  She denied have any numbness or tingling upper or lower extremities.      Past Medical History  Past Medical History:   Diagnosis Date    Diabetes mellitus (Multi)     Hypertension    Atrial fibrillation    Surgical History  Past Surgical History:   Procedure Laterality Date    HYSTERECTOMY      MASTECTOMY          Social History  She reports that she has never smoked. She has never used smokeless tobacco. She reports that she does not currently use alcohol. She reports that she does not use drugs.    Family History  Family History   Problem Relation Name Age of Onset    No Known Problems Mother      No Known Problems Father    "       Allergies  Patient has no known allergies.    Review of Systems     General: Negative for fever,  chills, positive for fatigue, hard hearing fatigue.    HEENT: Negative for headache, blurring of vision or double vision.    Cardiovascular: Negative for chest pain, palpitations or orthopnea.    Respiratory: Negative for cough, shortness of breath or wheezing.    Gastrointestinal: Negative for nausea, vomiting, hematemesis, abdominal pain or diarrhea.   Genitourinary: Negative for dysuria, hematuria, frequency or nocturia.   Musculoskeletal: Negative for joint pain, joint swelling or deformity.   Skin: Negative for rash, itching, or jaundice.   Hematologic: Negative for bleeding or bruising.   Neurologic: Negative for headache, loss of consciousness. syncope or seizures   Psychological: Negative for anxiety, hallucinations or depression.     Physical Exam     General pleasant, cooperating during physical exam.  HEENT: Pupils are equal and reactive to light and commendation , oral mucosa moist, no JVD oral mucosa is moist.  Cardiovascular: Irregularly irregular heart rhythm.  Lungs: Clear to auscultation bilaterally, no wheezing, no crackles, no dullness to percussion.  Abdomen: No hepatosplenomegaly appreciated, soft , not tender, positive bowel sounds, positive bowel movement.  Neuro: Alert and oriented x 2, strength in upper and lower extremities , sensation intact.  Psych: Patient had great insight was going on  Musculoskeletal: No swelling in lower extremities, no limitation in range of motion.  Vascular: Pulses are intact in upper and lower extremities  Skin: No petechiae, ecchymosis or other stigmata for dermatology disease.   Last Recorded Vitals  Blood pressure (!) 205/90, pulse 77, temperature 37.5 °C (99.5 °F), temperature source Temporal, resp. rate 20, height 1.62 m (5' 3.78\"), weight 62.3 kg (137 lb 5.6 oz), SpO2 96%.         Assessment/Plan   Principal Problem:    Atrial fibrillation with slow " ventricular response (Multi)  Active Problems:    Bradycardia    Atrial fibrillation with bradycardia   Patient been on metoprolol  Metoprolol and Coumadin on hold.  Patient was transferred from [facility] to be evaluated by electrophysiology.  She was evaluated by Dr. Mason team  Patient will be taken to the OR for pacemaker implant by Dr. Gautam   plan for pacemaker implant today  Patient is on a low-dose of dopamine  Heart rate improved.    Urinary tract infection.  Continue with ceftriaxone.    Diabetes mellitus type II.    Patient has been on metformin  Cover with insulin sliding scale  Diabetes mellitus has been controlled.  Hemoglobin A1c 5.3.    Congestive heart failure  Secondary to bradycardia  Managed with Lasix at [facility] ER  Patient is clinically hemodynamically stable euvolemic.    Hypertension   fairly controlled  Discussed with her daughter present in the room.    She will be evaluated by PT OT once she is stable.  I spent 60 minutes in the professional and overall care of this patient.      Abbey Whaley MD

## 2024-04-19 NOTE — NURSING NOTE
Pt arrived to U room 446. Pt is A&Ox3. Respirations even and unlabored on 2L O2 NC. Telemetry in place. Denies any pain at the moment. No fluids running. Vital signs being done. Oriented pt to room. Call light and belongings within reach. Will be continuing to monitor

## 2024-04-19 NOTE — PROGRESS NOTES
04/19/24 1308   Discharge Planning   Living Arrangements Alone   Support Systems Family members;Children   Assistance Needed Independent   Type of Residence Private residence   Patient expects to be discharged to: [facility]   Does the patient need discharge transport arranged? Yes   RoundTrip coordination needed? Yes   Has discharge transport been arranged? Yes   What day is the transport expected? 04/19/24     Heart rates have been between 30 and 50 and dopamine has been decreased to 2 mics during the night. She could not sleep as the bed was uncomfortable.   Discharged to stepdown bed at [facility] for possible pacemaker placement. Daughter aware and present when patient was discharged.

## 2024-04-19 NOTE — NURSING NOTE
Dr. Barrientos contacted due to elevated B/P 160/72 at 2 am and 154/71 now reduce Dopamine drip to 2 mcg/kg/min

## 2024-04-19 NOTE — Clinical Note
Patient Clipped and Prepped: right chest. Prepped with ChloraPrep, a minimum of 3 minute dry time, longer if needed, no pooling noted, patient draped in sterile fashion. Prepped x2

## 2024-04-19 NOTE — PROGRESS NOTES
Audelia Virgen is a 89 y.o. female on day 2 of admission presenting with Atrial fibrillation with slow ventricular response (Multi).      Subjective   Heart rates have been between 30 and 50 and dopamine has been decreased to 2 mics during the night.  She could not sleep as the bed was uncomfortable.  Still waiting for stepdown bed at [facility] for possible pacemaker placement       Objective     Last Recorded Vitals  /52 (BP Location: Right arm, Patient Position: Lying)   Pulse 50   Temp 36.9 °C (98.4 °F) (Oral)   Resp 17   Wt 62.3 kg (137 lb 5.6 oz)   SpO2 97%   Intake/Output last 3 Shifts:    Intake/Output Summary (Last 24 hours) at 4/19/2024 0719  Last data filed at 4/18/2024 0948  Gross per 24 hour   Intake 0 ml   Output --   Net 0 ml       Physical Exam  Alert oriented at baseline no distress  Chest clear  Heart irregular rate occasionally is above 70 as low as 50  Abdomen soft nontender  Extremities no edema  Neurologic exam nonfocal  Relevant Results  Reviewed a.m. labs  Assessment/Plan     Principal Problem:    Atrial fibrillation with slow ventricular response (Multi)      April 19:  #1 A-fib with slow ventricular response, electrophysiology recommended pacemaker will decrease dopamine rate to 1 continue to monitor beta-blocker has been discontinued  Echo results reviewed showing 55 to 60% ejection fraction moderate mitral valve regurgitation mild aortic valve regurgitation  2.  UTI, Rocephin urine culture still in progress  3.  Generalized weakness             Sergo Medel MD

## 2024-04-19 NOTE — NURSING NOTE
Admission HX completed. Dtr gave POA papers and was scanned by Mandy Suero.   Alissa (dtr) gave this RN a list of pt's current med list. This RN changed and completed med rec.  Family and Pt agreed to meds to beds upon DC.  Pt was educated on what to expect for upcoming Pacemaker placement today.     Dr. Charito Mccord was in to see pt, explained surgery and received consent from pt.

## 2024-04-19 NOTE — Clinical Note
The leads were placed into the connector and visually verified to be in correct position. Injury current obtained. [employer] ERICGRISELDA HICKMAN [id number], [id number]

## 2024-04-19 NOTE — NURSING NOTE
MADY Vega called with pt's HR prior to transfer ranges from  40-70  Vitamin K given per order ,dopamine drip stopped at 0913 .Report called to receiving RN at [facility] report also given to Kurt in cath lab . Pt transferred being transported to [facility] by ambulance

## 2024-04-20 ENCOUNTER — APPOINTMENT (OUTPATIENT)
Dept: RADIOLOGY | Facility: HOSPITAL | Age: 89
DRG: 242 | End: 2024-04-20
Payer: MEDICARE

## 2024-04-20 LAB
ANION GAP SERPL CALC-SCNC: 11 MMOL/L
BACTERIA UR CULT: NORMAL
BASOPHILS # BLD AUTO: 0.06 X10*3/UL (ref 0–0.1)
BASOPHILS NFR BLD AUTO: 0.7 %
BUN SERPL-MCNC: 28 MG/DL (ref 8–25)
CALCIUM SERPL-MCNC: 9.2 MG/DL (ref 8.5–10.4)
CHLORIDE SERPL-SCNC: 103 MMOL/L (ref 97–107)
CO2 SERPL-SCNC: 26 MMOL/L (ref 24–31)
CREAT SERPL-MCNC: 0.9 MG/DL (ref 0.4–1.6)
EGFRCR SERPLBLD CKD-EPI 2021: 59 ML/MIN/1.73M*2
EOSINOPHIL # BLD AUTO: 0.16 X10*3/UL (ref 0–0.4)
EOSINOPHIL NFR BLD AUTO: 1.9 %
ERYTHROCYTE [DISTWIDTH] IN BLOOD BY AUTOMATED COUNT: 14.4 % (ref 11.5–14.5)
GLUCOSE BLD MANUAL STRIP-MCNC: 136 MG/DL (ref 74–99)
GLUCOSE BLD MANUAL STRIP-MCNC: 137 MG/DL (ref 74–99)
GLUCOSE BLD MANUAL STRIP-MCNC: 85 MG/DL (ref 74–99)
GLUCOSE BLD MANUAL STRIP-MCNC: 85 MG/DL (ref 74–99)
GLUCOSE BLD MANUAL STRIP-MCNC: 93 MG/DL (ref 74–99)
GLUCOSE SERPL-MCNC: 81 MG/DL (ref 65–99)
HCT VFR BLD AUTO: 37.4 % (ref 36–46)
HGB BLD-MCNC: 12.1 G/DL (ref 12–16)
IMM GRANULOCYTES # BLD AUTO: 0.01 X10*3/UL (ref 0–0.5)
IMM GRANULOCYTES NFR BLD AUTO: 0.1 % (ref 0–0.9)
LYMPHOCYTES # BLD AUTO: 2.16 X10*3/UL (ref 0.8–3)
LYMPHOCYTES NFR BLD AUTO: 25.9 %
MCH RBC QN AUTO: 31.6 PG (ref 26–34)
MCHC RBC AUTO-ENTMCNC: 32.4 G/DL (ref 32–36)
MCV RBC AUTO: 98 FL (ref 80–100)
MONOCYTES # BLD AUTO: 0.92 X10*3/UL (ref 0.05–0.8)
MONOCYTES NFR BLD AUTO: 11 %
NEUTROPHILS # BLD AUTO: 5.03 X10*3/UL (ref 1.6–5.5)
NEUTROPHILS NFR BLD AUTO: 60.4 %
NRBC BLD-RTO: 0 /100 WBCS (ref 0–0)
PLATELET # BLD AUTO: 174 X10*3/UL (ref 150–450)
POTASSIUM SERPL-SCNC: 3.8 MMOL/L (ref 3.4–5.1)
RBC # BLD AUTO: 3.83 X10*6/UL (ref 4–5.2)
SODIUM SERPL-SCNC: 140 MMOL/L (ref 133–145)
WBC # BLD AUTO: 8.3 X10*3/UL (ref 4.4–11.3)

## 2024-04-20 PROCEDURE — 82947 ASSAY GLUCOSE BLOOD QUANT: CPT

## 2024-04-20 PROCEDURE — 2500000004 HC RX 250 GENERAL PHARMACY W/ HCPCS (ALT 636 FOR OP/ED): Performed by: INTERNAL MEDICINE

## 2024-04-20 PROCEDURE — 80048 BASIC METABOLIC PNL TOTAL CA: CPT | Performed by: INTERNAL MEDICINE

## 2024-04-20 PROCEDURE — 71046 X-RAY EXAM CHEST 2 VIEWS: CPT | Performed by: RADIOLOGY

## 2024-04-20 PROCEDURE — 71046 X-RAY EXAM CHEST 2 VIEWS: CPT

## 2024-04-20 PROCEDURE — 2500000001 HC RX 250 WO HCPCS SELF ADMINISTERED DRUGS (ALT 637 FOR MEDICARE OP): Performed by: INTERNAL MEDICINE

## 2024-04-20 PROCEDURE — 2500000001 HC RX 250 WO HCPCS SELF ADMINISTERED DRUGS (ALT 637 FOR MEDICARE OP): Performed by: HOSPITALIST

## 2024-04-20 PROCEDURE — 36415 COLL VENOUS BLD VENIPUNCTURE: CPT | Performed by: INTERNAL MEDICINE

## 2024-04-20 PROCEDURE — 2060000001 HC INTERMEDIATE ICU ROOM DAILY

## 2024-04-20 PROCEDURE — 85025 COMPLETE CBC W/AUTO DIFF WBC: CPT | Performed by: INTERNAL MEDICINE

## 2024-04-20 RX ORDER — ERYTHROMYCIN 5 MG/G
1 OINTMENT OPHTHALMIC EVERY 6 HOURS SCHEDULED
Status: DISCONTINUED | OUTPATIENT
Start: 2024-04-20 | End: 2024-04-22 | Stop reason: HOSPADM

## 2024-04-20 RX ADMIN — ALLOPURINOL 100 MG: 100 TABLET ORAL at 21:18

## 2024-04-20 RX ADMIN — ACETAMINOPHEN 650 MG: 325 TABLET ORAL at 00:24

## 2024-04-20 RX ADMIN — ERYTHROMYCIN 1 CM: 5 OINTMENT OPHTHALMIC at 23:00

## 2024-04-20 RX ADMIN — Medication 3 MG: at 22:16

## 2024-04-20 RX ADMIN — Medication 1 TABLET: at 09:05

## 2024-04-20 RX ADMIN — PANTOPRAZOLE SODIUM 40 MG: 40 TABLET, DELAYED RELEASE ORAL at 09:05

## 2024-04-20 RX ADMIN — CEFTRIAXONE SODIUM 1 G: 1 INJECTION, SOLUTION INTRAVENOUS at 14:54

## 2024-04-20 RX ADMIN — ERYTHROMYCIN 1 CM: 5 OINTMENT OPHTHALMIC at 13:02

## 2024-04-20 RX ADMIN — ERYTHROMYCIN 1 CM: 5 OINTMENT OPHTHALMIC at 06:34

## 2024-04-20 RX ADMIN — ERYTHROMYCIN 1 CM: 5 OINTMENT OPHTHALMIC at 02:23

## 2024-04-20 RX ADMIN — ACETAMINOPHEN 650 MG: 325 TABLET ORAL at 17:43

## 2024-04-20 RX ADMIN — ERYTHROMYCIN 1 CM: 5 OINTMENT OPHTHALMIC at 17:29

## 2024-04-20 ASSESSMENT — COGNITIVE AND FUNCTIONAL STATUS - GENERAL
EATING MEALS: A LITTLE
CLIMB 3 TO 5 STEPS WITH RAILING: A LOT
HELP NEEDED FOR BATHING: A LITTLE
TOILETING: A LITTLE
MOVING TO AND FROM BED TO CHAIR: A LITTLE
DRESSING REGULAR LOWER BODY CLOTHING: A LOT
STANDING UP FROM CHAIR USING ARMS: A LOT
TURNING FROM BACK TO SIDE WHILE IN FLAT BAD: A LITTLE
EATING MEALS: A LITTLE
DAILY ACTIVITIY SCORE: 17
HELP NEEDED FOR BATHING: A LITTLE
WALKING IN HOSPITAL ROOM: A LOT
PERSONAL GROOMING: A LITTLE
DRESSING REGULAR UPPER BODY CLOTHING: A LITTLE
WALKING IN HOSPITAL ROOM: A LOT
MOVING TO AND FROM BED TO CHAIR: A LITTLE
DAILY ACTIVITIY SCORE: 17
MOVING FROM LYING ON BACK TO SITTING ON SIDE OF FLAT BED WITH BEDRAILS: A LITTLE
PERSONAL GROOMING: A LITTLE
CLIMB 3 TO 5 STEPS WITH RAILING: A LOT
TOILETING: A LITTLE
MOVING FROM LYING ON BACK TO SITTING ON SIDE OF FLAT BED WITH BEDRAILS: A LITTLE
MOBILITY SCORE: 15
MOBILITY SCORE: 15
TURNING FROM BACK TO SIDE WHILE IN FLAT BAD: A LITTLE
DRESSING REGULAR UPPER BODY CLOTHING: A LITTLE
STANDING UP FROM CHAIR USING ARMS: A LOT
DRESSING REGULAR LOWER BODY CLOTHING: A LOT

## 2024-04-20 ASSESSMENT — PAIN DESCRIPTION - LOCATION: LOCATION: LEG

## 2024-04-20 ASSESSMENT — PAIN SCALES - GENERAL
PAINLEVEL_OUTOF10: 0 - NO PAIN
PAINLEVEL_OUTOF10: 3
PAINLEVEL_OUTOF10: 2
PAINLEVEL_OUTOF10: 0 - NO PAIN

## 2024-04-20 ASSESSMENT — PAIN - FUNCTIONAL ASSESSMENT
PAIN_FUNCTIONAL_ASSESSMENT: 0-10
PAIN_FUNCTIONAL_ASSESSMENT: WONG-BAKER FACES
PAIN_FUNCTIONAL_ASSESSMENT: FLACC (FACE, LEGS, ACTIVITY, CRY, CONSOLABILITY)
PAIN_FUNCTIONAL_ASSESSMENT: 0-10

## 2024-04-20 ASSESSMENT — PAIN DESCRIPTION - ORIENTATION: ORIENTATION: LEFT

## 2024-04-20 NOTE — CARE PLAN
The patient's goals for the shift include To feel better    The clinical goals for the shift include maintain Heart rate/rhythm between     Over the shift, the patient did not make progress toward the following goals. Barriers to progression include s/p permanent pacemaker. Recommendations to address these barriers include continuous ECG monitoring.

## 2024-04-20 NOTE — PROGRESS NOTES
Audelia Virgen is a 89 y.o. female on day 1 of admission presenting with Atrial fibrillation with slow ventricular response (Multi).      Subjective   Patient complain of feeling weak.  Patient was transferred from [facility].  She she had a pacemaker implant yesterday.       Objective     Last Recorded Vitals  /67   Pulse 60   Temp 36.7 °C (98.1 °F) (Temporal)   Resp 16   Wt 64.2 kg (141 lb 8.6 oz)   SpO2 99%   Intake/Output last 3 Shifts:    Intake/Output Summary (Last 24 hours) at 4/20/2024 1006  Last data filed at 4/20/2024 0824  Gross per 24 hour   Intake 120 ml   Output 10 ml   Net 110 ml       Admission Weight  Weight: 62.3 kg (137 lb 5.6 oz) (04/19/24 1256)    Daily Weight  04/20/24 : 64.2 kg (141 lb 8.6 oz)    Image Results  Electrophysiology procedure  Table formatting from the original result was not included.  Description of the Procedure:     Single chamber pacemaker implantation (right sided)    Patient history:  Please refer to the detailed history and physical on the patient's medical   chart.     Procedure narrative:  The patient was in the fasting state. The patient was set up for   continuous monitoring of surface 12 lead ECG and pulse oximetry. Blood   pressure was monitored. The procedure was performed under IV conscious   sedation supplemented with intermittent moderate sedation. The Right upper   chest was prepped and draped in the usual sterile fashion. Local   anesthesia: After preoperative IV antibiotic was completely infused,   subcutaneous tissues just medial to the Right deltopectoral area, were   infiltrated with Bupivacaine for local anesthesia.   Using a #15 scalpel, an incision was made, which was extended to the right   prepectoral fascia using blunt dissection. A pulse generator pocket was   created.  The Right cephalic vein was identified, the distal end was tied off, and   using Rhoades scissors, a venotomy was created. A wire (0.035 Glidewire) was   advanced  through the venotomy.   A 7F sheath was placed over of the guidewires. The RV pacing lead was then   advanced to the heart via fluoroscopic guidance. The ventricle was mapped,   and the lead was fixed to the right ventricular apex. Lead was   re-positioned 3 more time(s) to get optimal lead parameters for its final   location. After lead placement, appropriate sensing and thresholds were   obtained. The sheath was peeled away.  The lead was sutured in place to the pectoralis muscle using x2 ties. Lead   measurements were obtained.  A single chamber pacemaker pulse generator was attached to the lead and   implanted.  The device was interrogated and its parameters recorded; telemetered   electrograms and pacing and sensing thresholds were measured.   The wound was closed in two layers using #0 and #3-0 Ethibond. The skin   was approximated with subcuticular suture and skin adhesive. Steri-strips   were applied, and the incision covered with a sterile dressing. Manual   pressure was applied.    Final Implant Settings:  : Medtronic  Device: Single chamber pacemaker   Lead Parameters  RVent: 665ohms, R wave 6.0mV, threshold 0.5V@0.4msec    Summary:  Successful implantation of a Medtronic right sided Single chamber   pacemaker    Recommendations:    A PA-lateral chest X-ray should be performed and telemetry monitoring   continued until discharge.      Patient Instructions:  Please do not lift left arm above shoulder level for 4-5 weeks  No repetitive motion or lifting heavy weight for 4-5 weeks  No driving, alcohol or making legal decisions for 24 hours.  Keep wound completely dry for 7 days; may shower but keep bandage as dry   as possible.  No soaking in hot tubs or baths for 10 days. May sponge bath  Keep bandage on incision until seen in the office in 1 week.  Allow steristrips underneath to fall off naturally.    Please call our office if you notice any discharge or swelling around   incision or  fever.    Follow up:   The patient should be alert for bleeding, swelling, or signs of infection.   The patient should call the electrophysiologist immediately if symptoms   recur, or for any problems. The patient and family (with HIPPA consent)    have been instructed accordingly.   Follow up in Clinic for wound check.     See complete procedural log and parameters.     Complications:   None    Stents/Implants:   Implants       Pacemaker    Lead, Capsurefix Novus, 52 Cm - [id number] - Implanted        Inventory item: LEAD, CAPSUREFIX NOVUS, 52 CM Model/Cat number: 5076-52    Serial number: [id number] : [employer]    Lot number: [id number] Device identifier: [id number]    Implant Date: 4/19/2024        As of 4/19/2024       Status: Implanted                      Type Not Specified    Jenniffer S Sr Surescan Mri Implantable Pulse Generator - Implanted        Model/Cat number: W3SR01 Serial number: [id number]    Lot number: [id number] Device identifier: [id number]      As of 4/19/2024       Status: Implanted                            Estimated Blood Loss:   30 mL    Anesthesia: Moderate Sedation Anesthesia Staff: No anesthesia staff   entered.    Any Specimen(s) Removed:   No specimens collected during this procedure.  Electrocardiogram, 12-lead PRN ACS symptoms  Atrial fibrillation with slow ventricular response  Left anterior fascicular block  Anterior infarct (cited on or before 01-JUN-2006)  T wave abnormality, consider lateral ischemia  Abnormal ECG  When compared with ECG of 17-APR-2024 07:25, (unconfirmed)  Atrial fibrillation has replaced Junctional rhythm  T wave inversion now evident in Anterolateral leads  Confirmed by Darinel Gifford ([id number]) on 4/19/2024 1:53:06 PM  ECG 12 lead  Junctional rhythm with occasional Premature ventricular complexes  Left anterior fascicular block  Inferior infarct , age undetermined  Anterior infarct (cited on or before 01-JUN-2006)  Abnormal ECG  When  compared with ECG of 17-APR-2024 05:30, (unconfirmed)  Junctional rhythm has replaced Atrial fibrillation  Nonspecific T wave abnormality, improved in Lateral leads  QT has lengthened  Confirmed by Darinel Gifford ([id number]) on 4/19/2024 1:49:02 PM  ECG 12 lead  Atrial fibrillation with slow ventricular response  Left anterior fascicular block  Anterolateral infarct (cited on or before 01-JUN-2006)  Abnormal ECG  When compared with ECG of 08-JUN-2010 12:07,  Vent. rate has decreased BY  36 BPM  Questionable change in initial forces of Lateral leads  QT has shortened  Confirmed by Darinel Gifford ([id number]) on 4/19/2024 1:48:05 PM      Physical Exam    General: cooperating during physical exam.  HEENT: Pupils are equal and reactive to light and commendation , oral mucosa moist, no JVD .  Cardiovascular: Normal sinus rhythm, no MRG.  Lungs: Clear to auscultation bilaterally, no wheezing, no crackles, no dullness to percussion.  Abdomen: No hepatosplenomegaly appreciated, soft , not tender, positive bowel sounds, positive bowel movement.  Neuro: Alert and oriented x3, strength in upper and lower extremities , sensation intact.  Psych: Patient had great insight was going on  Musculoskeletal: No swelling in lower extremities, no limitation in range of motion.  Vascular: Pulses are intact in upper and lower extremities  Skin: No petechiae, ecchymosis or other stigmata for dermatology disease.     Assessment/Plan      Atrial fibrillation with bradycardia   Patient was on metoprolol   Status post pacemaker implant on 4/19/24 by Dr. Patricia  Monitor close.     Urinary tract infection.  Continue with ceftriaxone.     Diabetes mellitus type II.    Patient has been on metformin  Cover with insulin sliding scale  Diabetes mellitus has been controlled.  Hemoglobin A1c 5.3.     Congestive heart failure  Secondary to bradycardia  Managed with Lasix at [facility] ER  Patient is clinically hemodynamically stable euvolemic.     Hypertension    fairly controlled  Discussed with her daughter present in the room.     She will be evaluated by PT OT once she is stable.  Discussed with her daughter present in the room.        Principal Problem:    Atrial fibrillation with slow ventricular response (Multi)  Active Problems:    Bradycardia                  Abbey Whaley MD

## 2024-04-21 LAB
ANION GAP SERPL CALC-SCNC: 8 MMOL/L
BUN SERPL-MCNC: 25 MG/DL (ref 8–25)
CALCIUM SERPL-MCNC: 9 MG/DL (ref 8.5–10.4)
CHLORIDE SERPL-SCNC: 106 MMOL/L (ref 97–107)
CO2 SERPL-SCNC: 27 MMOL/L (ref 24–31)
CREAT SERPL-MCNC: 0.9 MG/DL (ref 0.4–1.6)
EGFRCR SERPLBLD CKD-EPI 2021: 59 ML/MIN/1.73M*2
ERYTHROCYTE [DISTWIDTH] IN BLOOD BY AUTOMATED COUNT: 14.5 % (ref 11.5–14.5)
GLUCOSE BLD MANUAL STRIP-MCNC: 108 MG/DL (ref 74–99)
GLUCOSE BLD MANUAL STRIP-MCNC: 138 MG/DL (ref 74–99)
GLUCOSE BLD MANUAL STRIP-MCNC: 95 MG/DL (ref 74–99)
GLUCOSE BLD MANUAL STRIP-MCNC: 99 MG/DL (ref 74–99)
GLUCOSE SERPL-MCNC: 96 MG/DL (ref 65–99)
HCT VFR BLD AUTO: 37 % (ref 36–46)
HGB BLD-MCNC: 11.9 G/DL (ref 12–16)
INR PPP: 1.2 (ref 0.9–1.2)
INR PPP: 1.2 (ref 0.9–1.2)
MCH RBC QN AUTO: 31.7 PG (ref 26–34)
MCHC RBC AUTO-ENTMCNC: 32.2 G/DL (ref 32–36)
MCV RBC AUTO: 99 FL (ref 80–100)
NRBC BLD-RTO: 0 /100 WBCS (ref 0–0)
PLATELET # BLD AUTO: 164 X10*3/UL (ref 150–450)
POTASSIUM SERPL-SCNC: 3.8 MMOL/L (ref 3.4–5.1)
PROTHROMBIN TIME: 12.4 SECONDS (ref 9.3–12.7)
PROTHROMBIN TIME: 12.4 SECONDS (ref 9.3–12.7)
RBC # BLD AUTO: 3.75 X10*6/UL (ref 4–5.2)
SODIUM SERPL-SCNC: 141 MMOL/L (ref 133–145)
WBC # BLD AUTO: 6.3 X10*3/UL (ref 4.4–11.3)

## 2024-04-21 PROCEDURE — 36415 COLL VENOUS BLD VENIPUNCTURE: CPT | Performed by: INTERNAL MEDICINE

## 2024-04-21 PROCEDURE — 80048 BASIC METABOLIC PNL TOTAL CA: CPT | Performed by: INTERNAL MEDICINE

## 2024-04-21 PROCEDURE — 2500000001 HC RX 250 WO HCPCS SELF ADMINISTERED DRUGS (ALT 637 FOR MEDICARE OP): Performed by: INTERNAL MEDICINE

## 2024-04-21 PROCEDURE — 97162 PT EVAL MOD COMPLEX 30 MIN: CPT | Mod: GP

## 2024-04-21 PROCEDURE — 85027 COMPLETE CBC AUTOMATED: CPT | Performed by: INTERNAL MEDICINE

## 2024-04-21 PROCEDURE — 2500000004 HC RX 250 GENERAL PHARMACY W/ HCPCS (ALT 636 FOR OP/ED): Performed by: INTERNAL MEDICINE

## 2024-04-21 PROCEDURE — 82947 ASSAY GLUCOSE BLOOD QUANT: CPT

## 2024-04-21 PROCEDURE — 85610 PROTHROMBIN TIME: CPT | Performed by: INTERNAL MEDICINE

## 2024-04-21 PROCEDURE — 2500000001 HC RX 250 WO HCPCS SELF ADMINISTERED DRUGS (ALT 637 FOR MEDICARE OP): Performed by: HOSPITALIST

## 2024-04-21 PROCEDURE — 97116 GAIT TRAINING THERAPY: CPT | Mod: GP

## 2024-04-21 PROCEDURE — 2500000006 HC RX 250 W HCPCS SELF ADMINISTERED DRUGS (ALT 637 FOR ALL PAYERS): Performed by: INTERNAL MEDICINE

## 2024-04-21 PROCEDURE — 2060000001 HC INTERMEDIATE ICU ROOM DAILY

## 2024-04-21 RX ORDER — HYDROXYZINE HYDROCHLORIDE 25 MG/1
25 TABLET, FILM COATED ORAL EVERY 8 HOURS PRN
Status: DISCONTINUED | OUTPATIENT
Start: 2024-04-21 | End: 2024-04-22 | Stop reason: HOSPADM

## 2024-04-21 RX ORDER — WARFARIN SODIUM 5 MG/1
5 TABLET ORAL DAILY
Status: DISCONTINUED | OUTPATIENT
Start: 2024-04-21 | End: 2024-04-22 | Stop reason: HOSPADM

## 2024-04-21 RX ORDER — METOPROLOL TARTRATE 25 MG/1
12.5 TABLET, FILM COATED ORAL 2 TIMES DAILY
Status: DISCONTINUED | OUTPATIENT
Start: 2024-04-21 | End: 2024-04-22 | Stop reason: HOSPADM

## 2024-04-21 RX ORDER — LOSARTAN POTASSIUM 50 MG/1
50 TABLET ORAL DAILY
Status: DISCONTINUED | OUTPATIENT
Start: 2024-04-21 | End: 2024-04-22 | Stop reason: HOSPADM

## 2024-04-21 RX ADMIN — CEFTRIAXONE SODIUM 1 G: 1 INJECTION, SOLUTION INTRAVENOUS at 15:48

## 2024-04-21 RX ADMIN — ERYTHROMYCIN 1 CM: 5 OINTMENT OPHTHALMIC at 17:39

## 2024-04-21 RX ADMIN — ALLOPURINOL 100 MG: 100 TABLET ORAL at 21:24

## 2024-04-21 RX ADMIN — WARFARIN SODIUM 5 MG: 5 TABLET ORAL at 17:39

## 2024-04-21 RX ADMIN — ACETAMINOPHEN 650 MG: 325 TABLET ORAL at 21:24

## 2024-04-21 RX ADMIN — HYDROXYZINE HYDROCHLORIDE 25 MG: 25 TABLET, FILM COATED ORAL at 23:07

## 2024-04-21 RX ADMIN — PANTOPRAZOLE SODIUM 40 MG: 40 TABLET, DELAYED RELEASE ORAL at 08:36

## 2024-04-21 RX ADMIN — Medication 3 MG: at 21:24

## 2024-04-21 RX ADMIN — ACETAMINOPHEN 650 MG: 325 TABLET ORAL at 01:54

## 2024-04-21 RX ADMIN — LOSARTAN POTASSIUM 50 MG: 50 TABLET, FILM COATED ORAL at 06:12

## 2024-04-21 RX ADMIN — Medication 1 TABLET: at 08:36

## 2024-04-21 RX ADMIN — ERYTHROMYCIN 1 CM: 5 OINTMENT OPHTHALMIC at 06:12

## 2024-04-21 RX ADMIN — METOPROLOL TARTRATE 12.5 MG: 25 TABLET, FILM COATED ORAL at 21:24

## 2024-04-21 RX ADMIN — METOPROLOL TARTRATE 12.5 MG: 25 TABLET, FILM COATED ORAL at 10:39

## 2024-04-21 RX ADMIN — ERYTHROMYCIN 1 CM: 5 OINTMENT OPHTHALMIC at 12:10

## 2024-04-21 ASSESSMENT — COGNITIVE AND FUNCTIONAL STATUS - GENERAL
CLIMB 3 TO 5 STEPS WITH RAILING: A LOT
TOILETING: A LITTLE
WALKING IN HOSPITAL ROOM: A LOT
STANDING UP FROM CHAIR USING ARMS: A LITTLE
TOILETING: A LITTLE
MOVING FROM LYING ON BACK TO SITTING ON SIDE OF FLAT BED WITH BEDRAILS: A LITTLE
PERSONAL GROOMING: A LITTLE
CLIMB 3 TO 5 STEPS WITH RAILING: A LOT
EATING MEALS: A LITTLE
TURNING FROM BACK TO SIDE WHILE IN FLAT BAD: A LITTLE
DRESSING REGULAR LOWER BODY CLOTHING: A LOT
MOVING TO AND FROM BED TO CHAIR: A LITTLE
STANDING UP FROM CHAIR USING ARMS: A LITTLE
STANDING UP FROM CHAIR USING ARMS: A LOT
HELP NEEDED FOR BATHING: A LITTLE
DRESSING REGULAR LOWER BODY CLOTHING: A LOT
MOVING FROM LYING ON BACK TO SITTING ON SIDE OF FLAT BED WITH BEDRAILS: A LITTLE
MOBILITY SCORE: 15
WALKING IN HOSPITAL ROOM: A LITTLE
CLIMB 3 TO 5 STEPS WITH RAILING: A LOT
MOVING TO AND FROM BED TO CHAIR: A LITTLE
DRESSING REGULAR UPPER BODY CLOTHING: A LITTLE
MOVING FROM LYING ON BACK TO SITTING ON SIDE OF FLAT BED WITH BEDRAILS: A LITTLE
MOVING TO AND FROM BED TO CHAIR: A LITTLE
DAILY ACTIVITIY SCORE: 17
TURNING FROM BACK TO SIDE WHILE IN FLAT BAD: A LITTLE
PERSONAL GROOMING: A LITTLE
DAILY ACTIVITIY SCORE: 17
TURNING FROM BACK TO SIDE WHILE IN FLAT BAD: A LITTLE
EATING MEALS: A LITTLE
HELP NEEDED FOR BATHING: A LITTLE
MOBILITY SCORE: 17
WALKING IN HOSPITAL ROOM: A LITTLE
DRESSING REGULAR UPPER BODY CLOTHING: A LITTLE
MOBILITY SCORE: 17

## 2024-04-21 ASSESSMENT — PAIN SCALES - GENERAL
PAINLEVEL_OUTOF10: 5 - MODERATE PAIN
PAINLEVEL_OUTOF10: 0 - NO PAIN
PAINLEVEL_OUTOF10: 5 - MODERATE PAIN
PAINLEVEL_OUTOF10: 4
PAINLEVEL_OUTOF10: 0 - NO PAIN

## 2024-04-21 ASSESSMENT — PAIN - FUNCTIONAL ASSESSMENT
PAIN_FUNCTIONAL_ASSESSMENT: 0-10

## 2024-04-21 ASSESSMENT — PAIN DESCRIPTION - LOCATION
LOCATION: LEG
LOCATION: LEG

## 2024-04-21 ASSESSMENT — PAIN DESCRIPTION - ORIENTATION
ORIENTATION: RIGHT;LEFT
ORIENTATION: RIGHT;LEFT

## 2024-04-21 ASSESSMENT — ACTIVITIES OF DAILY LIVING (ADL): ADL_ASSISTANCE: INDEPENDENT

## 2024-04-21 NOTE — NURSING NOTE
Assumed patient care. Patient is sitting up in chair. Requests to have a melatonin at 2200 and back into bed. Reminded pt to keep right arm still. Denies feeling any pain

## 2024-04-21 NOTE — PROGRESS NOTES
Physical Therapy    Physical Therapy Evaluation & Treatment    Patient Name: Audelia Virgen  MRN: [id number]  Today's Date: 4/21/2024   Time Calculation  Start Time: 1031  Stop Time: 1051  Time Calculation (min): 20 min    Assessment/Plan   PT Assessment  PT Assessment Results: Decreased strength, Decreased endurance, Impaired balance, Decreased mobility, Impaired judgement, Decreased safety awareness  Rehab Prognosis: Good  Evaluation/Treatment Tolerance: Patient tolerated treatment well  Medical Staff Made Aware: Yes  Strengths: Ability to acquire knowledge, Housing layout, Premorbid level of function, Support of extended family/friends  End of Session Communication: Bedside nurse  Assessment Comment: Pt demonstrates impaired functional mobility from baseline level; pt with mild balance/BLE strength deficits and overall decreased activity tolerance; recommend use of RW upon d/c; would benefit from continued skilled PT services during hospital stay to address above deficits and maximize functional mobility outcomes upon d/c.  End of Session Patient Position: Up in chair, Alarm off, caregiver present (daughter present, RN in room)   IP OR SWING BED PT PLAN  Inpatient or Swing Bed: Inpatient  PT Plan  Treatment/Interventions: Bed mobility, Transfer training, Gait training, Stair training, Balance training, Neuromuscular re-education, Strengthening, Endurance training, Therapeutic exercise, Therapeutic activity  PT Plan: Skilled PT  PT Frequency: 5 times per week  PT Discharge Recommendations: Low intensity level of continued care  Equipment Recommended upon Discharge: Wheeled walker  PT Recommended Transfer Status: Contact guard  PT - OK to Discharge: Yes    Subjective     General Visit Information:  General  Reason for Referral: impaired functional mobility (Pt is a 89 year-old F s/p pacemaker placement on 4/19/24.)  Referred By: Dr. Jovana MD  Past Medical History Relevant to Rehab: CHF, DM, HTN  Missed Visit:  No  Missed Visit Reason: Other (Comment)  Family/Caregiver Present: Yes  Caregiver Feedback: Daughter present.  Prior to Session Communication: Bedside nurse  Patient Position Received: Up in bathroom (in bathroom with PCA)  Preferred Learning Style: verbal  General Comment: Pt cleared for therapy via RN, received in standing in bathroom with PCA, NAD, agreeable to participate in therapy (+) telemetry  Home Living:  Home Living  Type of Home: Condo  Lives With: Alone  Home Adaptive Equipment: Cane  Home Layout: One level  Home Access: Stairs to enter with rails  Entrance Stairs-Rails: Both  Entrance Stairs-Number of Steps: 2  Bathroom Shower/Tub: Walk-in shower  Bathroom Toilet: Standard  Bathroom Equipment: Grab bars in shower, Built-in shower seat, Long handled sponge  Prior Level of Function:  Prior Function Per Pt/Caregiver Report  Level of [place]: Independent with ADLs and functional transfers, Independent with homemaking with ambulation  Receives Help From: Family (family local and supportive; daughter to stay with patient upon d/c)  ADL Assistance: Independent  Homemaking Assistance: Independent  Ambulatory Assistance: Independent  Vocational: Retired  Prior Function Comments: denies h/o falls  Precautions:  Precautions  Hearing/Visual Limitations: glasses; B hearing aids  Medical Precautions: Fall precautions  Post-Surgical Precautions: Other (comment) (pacemaker precautions)  Precautions Comment: s/p pacemaker placement 4/19  Vital Signs:  Vital Signs  Heart Rate: 60  Heart Rate Source: Monitor    Objective   Pain:  Pain Assessment  Pain Assessment: 0-10  Pain Score: 0 - No pain  Cognition:  Cognition  Overall Cognitive Status: Within Functional Limits  Orientation Level: Oriented X4    General Assessments:  General Observation  General Observation: pleasant/cooperative throughout; surgical bandage clean/intact     Activity Tolerance  Endurance: Tolerates 10 - 20 min exercise with multiple  rests    Sensation  Light Touch: No apparent deficits  Sensation Comment: pt denies paresthesias    Strength  Strength Comments: BLE > 4-/5  Strength  Strength Comments: BLE > 4-/5    Coordination  Movements are Fluid and Coordinated: Yes  Coordination Comment: decreased rate of movements    Postural Control  Postural Control: Impaired  Posture Comment: mild forward head, rounded shoulders    Static Sitting Balance  Static Sitting-Balance Support: Bilateral upper extremity supported, Feet supported  Static Sitting-Level of Assistance: Modified independent    Static Standing Balance  Static Standing-Balance Support: Bilateral upper extremity supported  Static Standing-Level of Assistance: Close supervision  Functional Assessments:     Bed Mobility  Bed Mobility: No    Transfers  Transfer: Yes  Transfer 1  Transfer From 1: Sit to  Transfer to 1: Stand  Technique 1: Sit to stand  Transfer Device 1: Walker  Transfer Level of Assistance 1: Contact guard  Trials/Comments 1: cueing for safety/sequencing  Transfers 2  Transfer From 2: Stand to  Transfer to 2: Sit  Technique 2: Stand to sit  Transfer Device 2: Walker  Transfer Level of Assistance 2: Close supervision  Trials/Comments 2: cueing for safe eccentric lowering into chair and proper hand placement    Ambulation/Gait Training  Ambulation/Gait Training Performed: Yes  Ambulation/Gait Training 1  Surface 1: Level tile  Device 1: Rolling walker  Assistance 1: Close supervision  Quality of Gait 1: Decreased step length (decreased tangela, reciprocating pattern, steady gait)  Comments/Distance (ft) 1: 15', 50' x 2    Stairs  Stairs: No     Extremity/Trunk Assessments:  RLE   RLE : Within Functional Limits  LLE   LLE : Within Functional Limits  Treatments:  Ambulation/Gait Training  Ambulation/Gait Training Performed: Yes  Ambulation/Gait Training 1  Surface 1: Level tile  Device 1: Rolling walker  Assistance 1: Close supervision  Quality of Gait 1: Decreased step length  (decreased tangela, reciprocating pattern, steady gait)  Comments/Distance (ft) 1: 15', 50' x 2  Outcome Measures:  [facility] Basic Mobility  Turning from your back to your side while in a flat bed without using bedrails: A little  Moving from lying on your back to sitting on the side of a flat bed without using bedrails: A little  Moving to and from bed to chair (including a wheelchair): A little  Standing up from a chair using your arms (e.g. wheelchair or bedside chair): A little  To walk in hospital room: A little  Climbing 3-5 steps with railing: A lot  Basic Mobility - Total Score: 17    Encounter Problems       Encounter Problems (Active)       Mobility       STG - Patient will ambulate 150' with use of rolling walker and modified independence. (Progressing)       Start:  04/21/24    Expected End:  05/05/24            STG - Patient will ascend and descend two stairs with use of B handrails and supervision for safety. (Not Progressing)       Start:  04/21/24    Expected End:  05/05/24               PT Transfers       STG - Patient to transfer to and from sit to supine with independence. (Progressing)       Start:  04/21/24    Expected End:  05/05/24            STG - Patient will transfer sit to and from stand with use of rolling walker and modified independence. (Progressing)       Start:  04/21/24    Expected End:  05/05/24            STG - Patient will follow R pacemaker precautions during functional mobility. (Progressing)       Start:  04/21/24    Expected End:  05/05/24               Pain - Adult              Education Documentation  Precautions, taught by Karissa Schroeder, PT at 4/21/2024 11:43 AM.  Learner: Patient  Readiness: Acceptance  Method: Explanation, Demonstration  Response: Demonstrated Understanding, Needs Reinforcement    Mobility Training, taught by Karissa Schroeder, PT at 4/21/2024 11:43 AM.  Learner: Patient  Readiness: Acceptance  Method: Explanation, Demonstration  Response: Demonstrated  Understanding, Needs Reinforcement    Education Comments  No comments found.

## 2024-04-21 NOTE — CARE PLAN
Problem: Pain  Goal: My pain/discomfort is manageable  Outcome: Progressing     Problem: Safety  Goal: Patient will be injury free during hospitalization  Outcome: Progressing  Goal: I will remain free of falls  Outcome: Progressing     Problem: Daily Care  Goal: Daily care needs are met  Outcome: Progressing     Problem: Psychosocial Needs  Goal: Demonstrates ability to cope with hospitalization/illness  Outcome: Progressing  Goal: Collaborate with me, my family, and caregiver to identify my specific goals  Outcome: Progressing     Problem: Discharge Barriers  Goal: My discharge needs are met  Outcome: Progressing     Problem: Diabetes  Goal: Achieve decreasing blood glucose levels by end of shift  Outcome: Progressing  Goal: Increase stability of blood glucose readings by end of shift  Outcome: Progressing  Goal: Decrease in ketones present in urine by end of shift  Outcome: Progressing  Goal: Maintain electrolyte levels within acceptable range throughout shift  Outcome: Progressing  Goal: Maintain glucose levels >70mg/dl to <250mg/dl throughout shift  Outcome: Progressing  Goal: Learn about and adhere to nutrition recommendations by end of shift  Outcome: Progressing  Goal: Vital signs within normal range for age by end of shift  Outcome: Progressing  Goal: Increase self care and/or family involovement by end of shift  Outcome: Progressing  Goal: Receive DSME education by end of shift  Outcome: Progressing     Problem: Pain - Adult  Goal: Verbalizes/displays adequate comfort level or baseline comfort level  Outcome: Progressing     Problem: Safety - Adult  Goal: Free from fall injury  Outcome: Progressing     Problem: Discharge Planning  Goal: Discharge to home or other facility with appropriate resources  Outcome: Progressing     Problem: Chronic Conditions and Co-morbidities  Goal: Patient's chronic conditions and co-morbidity symptoms are monitored and maintained or improved  Outcome: Progressing   The  patient's goals for the shift include To feel better    The clinical goals for the shift include manage pain and monitor vitals

## 2024-04-21 NOTE — DISCHARGE INSTRUCTIONS
May remove dressing in 7 days, can get wet but please do not soak, steri strips underneath: allow to dry and fall off naturally. No heaving lifting > 10-12 lbs over affected shoulder for 4-6 wks. No driving for 1 week.   Please call with any new signs of redness, tenderness or ecchymosis or any questions and concerns

## 2024-04-21 NOTE — PROGRESS NOTES
Audelia Virgen is a 89 y.o. female on day 2 of admission presenting with Atrial fibrillation with slow ventricular response (Multi).      Subjective   Patient complained of feeling weak.  She denied chest pain or shortness of breath.  She complained of pain in the place of incision.         Objective     Last Recorded Vitals  /74 (BP Location: Right arm, Patient Position: Lying)   Pulse 60   Temp 36.7 °C (98.1 °F) (Temporal)   Resp 18   Wt 64.3 kg (141 lb 12.1 oz)   SpO2 96%   Intake/Output last 3 Shifts:    Intake/Output Summary (Last 24 hours) at 4/21/2024 0945  Last data filed at 4/21/2024 0700  Gross per 24 hour   Intake 530 ml   Output --   Net 530 ml       Admission Weight  Weight: 62.3 kg (137 lb 5.6 oz) (04/19/24 1256)    Daily Weight  04/21/24 : 64.3 kg (141 lb 12.1 oz)    Image Results  Electrophysiology procedure  Table formatting from the original result was not included.  Description of the Procedure:     Single chamber pacemaker implantation (right sided)    Patient history:  Please refer to the detailed history and physical on the patient's medical   chart.     Procedure narrative:  The patient was in the fasting state. The patient was set up for   continuous monitoring of surface 12 lead ECG and pulse oximetry. Blood   pressure was monitored. The procedure was performed under IV conscious   sedation supplemented with intermittent moderate sedation. The Right upper   chest was prepped and draped in the usual sterile fashion. Local   anesthesia: After preoperative IV antibiotic was completely infused,   subcutaneous tissues just medial to the Right deltopectoral area, were   infiltrated with Bupivacaine for local anesthesia.   Using a #15 scalpel, an incision was made, which was extended to the right   prepectoral fascia using blunt dissection. A pulse generator pocket was   created.  The Right cephalic vein was identified, the distal end was tied off, and   using Rhoades scissors, a venotomy  was created. A wire (0.035 Glidewire) was   advanced through the venotomy.   A 7F sheath was placed over of the guidewires. The RV pacing lead was then   advanced to the heart via fluoroscopic guidance. The ventricle was mapped,   and the lead was fixed to the right ventricular apex. Lead was   re-positioned 3 more time(s) to get optimal lead parameters for its final   location. After lead placement, appropriate sensing and thresholds were   obtained. The sheath was peeled away.  The lead was sutured in place to the pectoralis muscle using x2 ties. Lead   measurements were obtained.  A single chamber pacemaker pulse generator was attached to the lead and   implanted.  The device was interrogated and its parameters recorded; telemetered   electrograms and pacing and sensing thresholds were measured.   The wound was closed in two layers using #0 and #3-0 Ethibond. The skin   was approximated with subcuticular suture and skin adhesive. Steri-strips   were applied, and the incision covered with a sterile dressing. Manual   pressure was applied.    Final Implant Settings:  : Medtronic  Device: Single chamber pacemaker   Lead Parameters  RVent: 665ohms, R wave 6.0mV, threshold 0.5V@0.4msec    Summary:  Successful implantation of a Medtronic right sided Single chamber   pacemaker    Recommendations:    A PA-lateral chest X-ray should be performed and telemetry monitoring   continued until discharge.      Patient Instructions:  Please do not lift left arm above shoulder level for 4-5 weeks  No repetitive motion or lifting heavy weight for 4-5 weeks  No driving, alcohol or making legal decisions for 24 hours.  Keep wound completely dry for 7 days; may shower but keep bandage as dry   as possible.  No soaking in hot tubs or baths for 10 days. May sponge bath  Keep bandage on incision until seen in the office in 1 week.  Allow steristrips underneath to fall off naturally.    Please call our office if you notice any  discharge or swelling around   incision or fever.    Follow up:   The patient should be alert for bleeding, swelling, or signs of infection.   The patient should call the electrophysiologist immediately if symptoms   recur, or for any problems. The patient and family (with HIPPA consent)    have been instructed accordingly.   Follow up in Clinic for wound check.     See complete procedural log and parameters.     Complications:   None    Stents/Implants:   Implants       Pacemaker    Lead, Capsurefix Novus, 52 Cm - [id number] - Implanted        Inventory item: LEAD, CAPSUREFIX NOVUS, 52 CM Model/Cat number: [id number]    Serial number: [id number] : [employer]    Lot number: [id number] Device identifier: [id number]    Implant Date: 4/19/2024        As of 4/19/2024       Status: Implanted                      Type Not Specified    Jenniffer S Sr Surescan Mri Implantable Pulse Generator - Implanted        Model/Cat number: [id number] Serial number: [id number]    Lot number: [id number] Device identifier: [id number]      As of 4/19/2024       Status: Implanted                            Estimated Blood Loss:   30 mL    Anesthesia: Moderate Sedation Anesthesia Staff: No anesthesia staff   entered.    Any Specimen(s) Removed:   No specimens collected during this procedure.  Electrocardiogram, 12-lead PRN ACS symptoms  Atrial fibrillation with slow ventricular response  Left anterior fascicular block  Anterior infarct (cited on or before 01-JUN-2006)  T wave abnormality, consider lateral ischemia  Abnormal ECG  When compared with ECG of 17-APR-2024 07:25, (unconfirmed)  Atrial fibrillation has replaced Junctional rhythm  T wave inversion now evident in Anterolateral leads  Confirmed by Darinel Gifford ([id number]) on 4/19/2024 1:53:06 PM  ECG 12 lead  Junctional rhythm with occasional Premature ventricular complexes  Left anterior fascicular block  Inferior infarct , age undetermined  Anterior infarct (cited on or  before 01-JUN-2006)  Abnormal ECG  When compared with ECG of 17-APR-2024 05:30, (unconfirmed)  Junctional rhythm has replaced Atrial fibrillation  Nonspecific T wave abnormality, improved in Lateral leads  QT has lengthened  Confirmed by Darinel Gifford ([id number]) on 4/19/2024 1:49:02 PM  ECG 12 lead  Atrial fibrillation with slow ventricular response  Left anterior fascicular block  Anterolateral infarct (cited on or before 01-JUN-2006)  Abnormal ECG  When compared with ECG of 08-JUN-2010 12:07,  Vent. rate has decreased BY  36 BPM  Questionable change in initial forces of Lateral leads  QT has shortened  Confirmed by Darinel Gifford ([id number]) on 4/19/2024 1:48:05 PM      Physical Exam    General: cooperating during physical exam.  HEENT: Pupils are equal and reactive to light and commendation , oral mucosa moist, no JVD .  Slight erythema in right conjunctiva, vision intact  Cardiovascular: Normal sinus rhythm  Lungs: Clear to auscultation bilaterally, no wheezing, no crackles, no dullness to percussion.  Abdomen: No hepatosplenomegaly appreciated, soft , not tender, positive bowel sounds, positive bowel movement.  Neuro: Alert and oriented x2, strength in upper and lower extremities , sensation intact.  Psych: Patient had great insight was going on  Musculoskeletal: No swelling in lower extremities, no limitation in range of motion.  Vascular: Pulses are intact in upper and lower extremities  Skin: No petechiae, ecchymosis or other stigmata for dermatology disease.     Assessment/Plan      Atrial fibrillation with bradycardia   Patient was on metoprolol   Status post pacemaker implant on 4/19/24 by Dr. Patricia  Discussed in detail with Dr. Mason group.    Start low-dose of metoprolol  Resume Coumadin.    Urinary tract infection.  Continue with ceftriaxone.     Diabetes mellitus type II.    Patient has been on metformin  Cover with insulin sliding scale  Diabetes mellitus has been controlled.  Hemoglobin A1c 5.3.      Congestive heart failure  Secondary to bradycardia  Managed with Lasix at [facility]  Patient is clinically hemodynamically stable euvolemic.     Hypertension  Not well-controlled.  Patient was started on losartan  Discussed with Dr. Mason team   start low-dose of beta-blockers    Discussed with her daughter present in the room.     PT/ OT to see her  Discussed with her daughter present in the room.  Plan to discharge in a.m.      Principal Problem:    Atrial fibrillation with slow ventricular response (Multi)  Active Problems:    Bradycardia                  Abbey Whaley MD

## 2024-04-22 ENCOUNTER — PHARMACY VISIT (OUTPATIENT)
Dept: PHARMACY | Facility: CLINIC | Age: 89
End: 2024-04-22
Payer: MEDICARE

## 2024-04-22 ENCOUNTER — DOCUMENTATION (OUTPATIENT)
Dept: CARDIOLOGY | Facility: HOSPITAL | Age: 89
End: 2024-04-22
Payer: MEDICARE

## 2024-04-22 VITALS
SYSTOLIC BLOOD PRESSURE: 159 MMHG | TEMPERATURE: 98.4 F | BODY MASS INDEX: 23.9 KG/M2 | RESPIRATION RATE: 18 BRPM | WEIGHT: 140 LBS | OXYGEN SATURATION: 96 % | DIASTOLIC BLOOD PRESSURE: 59 MMHG | HEART RATE: 60 BPM | HEIGHT: 64 IN

## 2024-04-22 PROBLEM — R29.898 MUSCULAR DECONDITIONING: Status: ACTIVE | Noted: 2024-04-22

## 2024-04-22 PROBLEM — H10.9 CONJUNCTIVITIS: Status: ACTIVE | Noted: 2024-04-22

## 2024-04-22 LAB
ANION GAP SERPL CALC-SCNC: 10 MMOL/L
BUN SERPL-MCNC: 25 MG/DL (ref 8–25)
CALCIUM SERPL-MCNC: 8.9 MG/DL (ref 8.5–10.4)
CHLORIDE SERPL-SCNC: 106 MMOL/L (ref 97–107)
CO2 SERPL-SCNC: 26 MMOL/L (ref 24–31)
CREAT SERPL-MCNC: 1 MG/DL (ref 0.4–1.6)
EGFRCR SERPLBLD CKD-EPI 2021: 52 ML/MIN/1.73M*2
ERYTHROCYTE [DISTWIDTH] IN BLOOD BY AUTOMATED COUNT: 14.5 % (ref 11.5–14.5)
GLUCOSE BLD MANUAL STRIP-MCNC: 103 MG/DL (ref 74–99)
GLUCOSE BLD MANUAL STRIP-MCNC: 93 MG/DL (ref 74–99)
GLUCOSE SERPL-MCNC: 91 MG/DL (ref 65–99)
HCT VFR BLD AUTO: 35.9 % (ref 36–46)
HGB BLD-MCNC: 11.6 G/DL (ref 12–16)
INR PPP: 1.2 (ref 0.9–1.2)
MCH RBC QN AUTO: 31.6 PG (ref 26–34)
MCHC RBC AUTO-ENTMCNC: 32.3 G/DL (ref 32–36)
MCV RBC AUTO: 98 FL (ref 80–100)
NRBC BLD-RTO: 0 /100 WBCS (ref 0–0)
PLATELET # BLD AUTO: 156 X10*3/UL (ref 150–450)
POTASSIUM SERPL-SCNC: 3.7 MMOL/L (ref 3.4–5.1)
PROTHROMBIN TIME: 12.2 SECONDS (ref 9.3–12.7)
RBC # BLD AUTO: 3.67 X10*6/UL (ref 4–5.2)
SODIUM SERPL-SCNC: 142 MMOL/L (ref 133–145)
WBC # BLD AUTO: 5.9 X10*3/UL (ref 4.4–11.3)

## 2024-04-22 PROCEDURE — 82947 ASSAY GLUCOSE BLOOD QUANT: CPT

## 2024-04-22 PROCEDURE — 97116 GAIT TRAINING THERAPY: CPT | Mod: GP,CQ

## 2024-04-22 PROCEDURE — 97166 OT EVAL MOD COMPLEX 45 MIN: CPT | Mod: GO

## 2024-04-22 PROCEDURE — 2500000001 HC RX 250 WO HCPCS SELF ADMINISTERED DRUGS (ALT 637 FOR MEDICARE OP): Performed by: INTERNAL MEDICINE

## 2024-04-22 PROCEDURE — 97535 SELF CARE MNGMENT TRAINING: CPT | Mod: GO

## 2024-04-22 PROCEDURE — 2500000001 HC RX 250 WO HCPCS SELF ADMINISTERED DRUGS (ALT 637 FOR MEDICARE OP): Performed by: HOSPITALIST

## 2024-04-22 PROCEDURE — 85610 PROTHROMBIN TIME: CPT | Performed by: INTERNAL MEDICINE

## 2024-04-22 PROCEDURE — 80048 BASIC METABOLIC PNL TOTAL CA: CPT | Performed by: INTERNAL MEDICINE

## 2024-04-22 PROCEDURE — RXMED WILLOW AMBULATORY MEDICATION CHARGE

## 2024-04-22 PROCEDURE — 2500000004 HC RX 250 GENERAL PHARMACY W/ HCPCS (ALT 636 FOR OP/ED): Performed by: INTERNAL MEDICINE

## 2024-04-22 PROCEDURE — 97530 THERAPEUTIC ACTIVITIES: CPT | Mod: GP,CQ

## 2024-04-22 PROCEDURE — 85027 COMPLETE CBC AUTOMATED: CPT | Performed by: INTERNAL MEDICINE

## 2024-04-22 PROCEDURE — 36415 COLL VENOUS BLD VENIPUNCTURE: CPT | Performed by: INTERNAL MEDICINE

## 2024-04-22 RX ORDER — ERYTHROMYCIN 5 MG/G
OINTMENT OPHTHALMIC EVERY 6 HOURS SCHEDULED
Qty: 3.5 G | Refills: 0 | Status: SHIPPED | OUTPATIENT
Start: 2024-04-22

## 2024-04-22 RX ORDER — METOPROLOL TARTRATE 25 MG/1
12.5 TABLET, FILM COATED ORAL 2 TIMES DAILY
Qty: 90 TABLET | Refills: 0 | Status: SHIPPED | OUTPATIENT
Start: 2024-04-22

## 2024-04-22 RX ADMIN — ERYTHROMYCIN 1 CM: 5 OINTMENT OPHTHALMIC at 00:59

## 2024-04-22 RX ADMIN — PANTOPRAZOLE SODIUM 40 MG: 40 TABLET, DELAYED RELEASE ORAL at 09:47

## 2024-04-22 RX ADMIN — POLYETHYLENE GLYCOL 3350 17 G: 17 POWDER, FOR SOLUTION ORAL at 09:46

## 2024-04-22 RX ADMIN — ERYTHROMYCIN 1 CM: 5 OINTMENT OPHTHALMIC at 13:16

## 2024-04-22 RX ADMIN — METOPROLOL TARTRATE 12.5 MG: 25 TABLET, FILM COATED ORAL at 09:46

## 2024-04-22 RX ADMIN — LOSARTAN POTASSIUM 50 MG: 50 TABLET, FILM COATED ORAL at 09:46

## 2024-04-22 RX ADMIN — Medication 1 TABLET: at 09:46

## 2024-04-22 RX ADMIN — ERYTHROMYCIN 1 CM: 5 OINTMENT OPHTHALMIC at 05:33

## 2024-04-22 ASSESSMENT — COGNITIVE AND FUNCTIONAL STATUS - GENERAL
MOBILITY SCORE: 18
TOILETING: A LITTLE
HELP NEEDED FOR BATHING: A LITTLE
CLIMB 3 TO 5 STEPS WITH RAILING: A LITTLE
STANDING UP FROM CHAIR USING ARMS: A LITTLE
EATING MEALS: A LITTLE
PERSONAL GROOMING: A LITTLE
PERSONAL GROOMING: A LITTLE
HELP NEEDED FOR BATHING: A LITTLE
CLIMB 3 TO 5 STEPS WITH RAILING: A LITTLE
WALKING IN HOSPITAL ROOM: A LITTLE
DRESSING REGULAR UPPER BODY CLOTHING: A LITTLE
DRESSING REGULAR LOWER BODY CLOTHING: A LITTLE
DAILY ACTIVITIY SCORE: 19
MOBILITY SCORE: 21
MOVING TO AND FROM BED TO CHAIR: A LITTLE
DAILY ACTIVITIY SCORE: 18
DRESSING REGULAR LOWER BODY CLOTHING: A LITTLE
TURNING FROM BACK TO SIDE WHILE IN FLAT BAD: A LITTLE
MOVING FROM LYING ON BACK TO SITTING ON SIDE OF FLAT BED WITH BEDRAILS: A LITTLE
DRESSING REGULAR UPPER BODY CLOTHING: A LITTLE
TOILETING: A LITTLE
TURNING FROM BACK TO SIDE WHILE IN FLAT BAD: A LITTLE
MOVING TO AND FROM BED TO CHAIR: A LITTLE

## 2024-04-22 ASSESSMENT — PAIN SCALES - GENERAL
PAINLEVEL_OUTOF10: 0 - NO PAIN

## 2024-04-22 ASSESSMENT — ACTIVITIES OF DAILY LIVING (ADL)
HOME_MANAGEMENT_TIME_ENTRY: 14
BATHING_ASSISTANCE: MINIMAL
ADL_ASSISTANCE: INDEPENDENT

## 2024-04-22 ASSESSMENT — PAIN - FUNCTIONAL ASSESSMENT
PAIN_FUNCTIONAL_ASSESSMENT: 0-10
PAIN_FUNCTIONAL_ASSESSMENT: 0-10
PAIN_FUNCTIONAL_ASSESSMENT: WONG-BAKER FACES

## 2024-04-22 NOTE — PROGRESS NOTES
"Audelia Virgen is a 89 y.o. female on day 3 of admission presenting with Atrial fibrillation by electrocardiogram (Multi).    Subjective   Looks great       Objective     Physical Exam  Gen: A+O x 3, no acute distress  HEENT: Normocephalic/atraumatic pupils equal react light  Neck: No JVD, upstrokes and volumes nl, no bruits   Lung: CTA, nl AP diameter  CV:  nl sounding S1, S2, + murmur, PMI nondisplaced, pectoral incision site intact with dressing, hematuria,  Abdomen: soft, non tender, + BS x 4   Extremities: warm to touch, palpable pulses bilaterally, no edema   Neuro: no neurologic deficits  Last Recorded Vitals  Blood pressure 159/59, pulse 60, temperature 36.9 °C (98.4 °F), temperature source Temporal, resp. rate 18, height 1.62 m (5' 3.78\"), weight 63.5 kg (140 lb), SpO2 96%.  Intake/Output last 3 Shifts:  I/O last 3 completed shifts:  In: 480 (7.6 mL/kg) [P.O.:480]  Out: - (0 mL/kg)   Weight: 63.5 kg     Relevant Results  Results for orders placed or performed during the hospital encounter of 04/19/24 (from the past 24 hour(s))   POCT GLUCOSE   Result Value Ref Range    POCT Glucose 99 74 - 99 mg/dL   POCT GLUCOSE   Result Value Ref Range    POCT Glucose 138 (H) 74 - 99 mg/dL   Basic metabolic panel   Result Value Ref Range    Glucose 91 65 - 99 mg/dL    Sodium 142 133 - 145 mmol/L    Potassium 3.7 3.4 - 5.1 mmol/L    Chloride 106 97 - 107 mmol/L    Bicarbonate 26 24 - 31 mmol/L    Urea Nitrogen 25 8 - 25 mg/dL    Creatinine 1.00 0.40 - 1.60 mg/dL    eGFR 52 (L) >60 mL/min/1.73m*2    Calcium 8.9 8.5 - 10.4 mg/dL    Anion Gap 10 <=19 mmol/L   CBC   Result Value Ref Range    WBC 5.9 4.4 - 11.3 x10*3/uL    nRBC 0.0 0.0 - 0.0 /100 WBCs    RBC 3.67 (L) 4.00 - 5.20 x10*6/uL    Hemoglobin 11.6 (L) 12.0 - 16.0 g/dL    Hematocrit 35.9 (L) 36.0 - 46.0 %    MCV 98 80 - 100 fL    MCH 31.6 26.0 - 34.0 pg    MCHC 32.3 32.0 - 36.0 g/dL    RDW 14.5 11.5 - 14.5 %    Platelets 156 150 - 450 x10*3/uL   Protime-INR   Result " Value Ref Range    Protime 12.2 9.3 - 12.7 seconds    INR 1.2 0.9 - 1.2   POCT GLUCOSE   Result Value Ref Range    POCT Glucose 93 74 - 99 mg/dL   POCT GLUCOSE   Result Value Ref Range    POCT Glucose 103 (H) 74 - 99 mg/dL       Assessment/Plan   Principal Problem:    Atrial fibrillation by electrocardiogram (Multi)  Active Problems:    Bradycardia    Conjunctivitis    Muscular deconditioning  4/22:  Status post single-chamber device for permanent atrial fibrillation and marked bradycardia, doing well site intact mental status very clear V pacing  Resumed on her metoprolol at a lesser dose, suspect will need to increase as an outpatient  Warfarin resumed  UTI treated  Appointment made for device follow-up       30 min spent in professional and overall care of this patient.  Coleen Vega, APRN-CNP

## 2024-04-22 NOTE — PROGRESS NOTES
Physical Therapy    Physical Therapy Treatment    Patient Name: Audelia Virgen  MRN: [id number]  Today's Date: 4/22/2024  Time Calculation  Start Time: 1230  Stop Time: 1253  Time Calculation (min): 23 min       Assessment/Plan   PT Assessment  PT Assessment Results: Decreased strength, Decreased endurance, Impaired balance, Decreased mobility, Impaired judgement, Decreased safety awareness  Rehab Prognosis: Good  Evaluation/Treatment Tolerance: Patient tolerated treatment well  Strengths: Ability to acquire knowledge, Support of Caregivers, Support of extended family/friends  End of Session Communication: Bedside nurse  Assessment Comment: Pt demonstrates impaired functional mobility from baseline level; pt with mild balance/BLE strength deficits and overall decreased activity tolerance; recommend use of RW upon d/c; would benefit from continued skilled PT services during hospital stay to address above deficits and maximize functional mobility outcomes upon d/c. 4/21   AD issued and education provided for home going needs 4/22  End of Session Patient Position: Up in chair, Alarm off, not on at start of session  PT Plan  Inpatient/Swing Bed or Outpatient: Inpatient  PT Plan  Treatment/Interventions: Bed mobility, Transfer training, Gait training, Stair training, Balance training, Strengthening, Endurance training, Range of motion, Therapeutic exercise, Therapeutic activity  PT Plan: Skilled PT  PT Frequency: 5 times per week  PT Discharge Recommendations: Low intensity level of continued care  Equipment Recommended upon Discharge: Wheeled walker  PT Recommended Transfer Status: Contact guard  PT - OK to Discharge: Yes      General Visit Information:   PT  Visit  PT Received On: 04/22/24  Response to Previous Treatment: Patient with no complaints from previous session.  General  Reason for Referral: Pt is a 89 year-old F s/p pacemaker placement on 4/19/24  Referred By: Dr. Jovana MD  Past Medical History Relevant to  Rehab: CHF, DM, HTN  Missed Visit: No  Family/Caregiver Present: Yes  Caregiver Feedback: Daughter present  Prior to Session Communication: Bedside nurse  Patient Position Received: Up in chair, Alarm off, not on at start of session  Preferred Learning Style: verbal, visual  General Comment: Cleared by RN, NAD, agreeable to PT    Subjective   Precautions:  Precautions  Hearing/Visual Limitations: glasses; B hearing aids  Medical Precautions: Fall precautions  Post-Surgical Precautions: Other (comment) (Pacemaker precautions)  Precautions Comment: silver bandage at right sided pacemaker site  Vital Signs:     Objective   Pain:  Pain Assessment  Pain Score: 0 - No pain  Cognition:  Cognition  Overall Cognitive Status: Within Functional Limits  Orientation Level: Oriented X4  Postural Control:  Postural Control  Postural Control: Impaired  Posture Comment: mild forward head, rounded shoulders  Static Sitting Balance  Static Sitting-Balance Support: Feet supported  Static Sitting-Level of Assistance: Modified independent  Static Sitting-Comment/Number of Minutes: Good  Dynamic Sitting Balance  Dynamic Sitting-Balance Support: Feet supported  Dynamic Sitting-Balance: Forward lean  Dynamic Sitting-Comments: Good  Static Standing Balance  Static Standing-Balance Support: No upper extremity supported  Static Standing-Level of Assistance: Close supervision  Static Standing-Comment/Number of Minutes: Good  Dynamic Standing Balance  Dynamic Standing-Balance Support: Bilateral upper extremity supported  Dynamic Standing-Balance: Turning  Dynamic Standing-Comments: +RW for ambulation.     Activity Tolerance:  Activity Tolerance  Endurance: Decreased tolerance for upright activites  Treatments:  Therapeutic Exercise  Therapeutic Exercise Performed: No    Therapeutic Activity  Therapeutic Activity Performed: Yes  Therapeutic Activity 1: Functioanl transfers, ambulation, home going education.    Bed Mobility  Bed Mobility:  No    Ambulation/Gait Training  Ambulation/Gait Training Performed: Yes  Ambulation/Gait Training 1  Surface 1: Level tile  Device 1: Rolling walker  Assistance 1: Close supervision  Quality of Gait 1: Decreased step length  Comments/Distance (ft) 1: 30'x1  Transfers  Transfer: Yes  Transfer 1  Transfer From 1: Chair with arms to  Transfer to 1: Chair with arms  Technique 1: Sit to stand, Stand to sit  Transfer Device 1: Walker  Transfer Level of Assistance 1: Contact guard  Trials/Comments 1: Good safety demonstrated after education provided    Stairs  Stairs: No (Education provided)    Outcome Measures:  [facility] Basic Mobility  Turning from your back to your side while in a flat bed without using bedrails: None  Moving from lying on your back to sitting on the side of a flat bed without using bedrails: A little  Moving to and from bed to chair (including a wheelchair): A little  Standing up from a chair using your arms (e.g. wheelchair or bedside chair): None  To walk in hospital room: None  Climbing 3-5 steps with railing: A little  Basic Mobility - Total Score: 21    OP EDUCATION:  Outpatient Education  Individual(s) Educated: Patient, Child  Education Provided: Anatomy, Body Mechanics, Fall Risk, Home Safety, Posture, Other (Energy conservation)  Patient/Caregiver Demonstrated Understanding: yes  Patient Response to Education: Patient/Caregiver Verbalized Understanding of Information, Patient/Caregiver Performed Return Demonstration of Exercises/Activities, Patient/Caregiver Asked Appropriate Questions    Encounter Problems       Encounter Problems (Active)       Mobility       STG - Patient will ambulate 150' with use of rolling walker and modified independence. (Progressing)       Start:  04/21/24    Expected End:  05/05/24            STG - Patient will ascend and descend two stairs with use of B handrails and supervision for safety. (Not Progressing)       Start:  04/21/24    Expected End:  05/05/24                PT Transfers       STG - Patient to transfer to and from sit to supine with independence. (Progressing)       Start:  04/21/24    Expected End:  05/05/24            STG - Patient will transfer sit to and from stand with use of rolling walker and modified independence. (Progressing)       Start:  04/21/24    Expected End:  05/05/24            STG - Patient will follow R pacemaker precautions during functional mobility. (Progressing)       Start:  04/21/24    Expected End:  05/05/24               Pain - Adult

## 2024-04-22 NOTE — CARE PLAN
The patient's goals for the shift include To feel better    The clinical goals for the shift include monitor vitals and anxiety control    Over the shift, the patient did not make progress toward the following goals. Barriers to progression include patient feeling anxious and restless. Recommendations to address these barriers include medication repositioning and reassurance.

## 2024-04-22 NOTE — PROGRESS NOTES
Occupational Therapy    Evaluation/Treatment    Patient Name: Audelia Virgen  MRN: [id number]  : 3/24/1930  Today's Date: 24  Time Calculation  Start Time: 822  Stop Time: 851  Time Calculation (min): 29 min       Assessment:  OT Assessment: Pt would benefit from acute OT services  End of Session Communication: Bedside nurse  End of Session Patient Position: Up in chair, Alarm off, not on at start of session (all needs in reach, RN aware)  OT Assessment Results: Decreased ADL status, Decreased upper extremity range of motion, Decreased upper extremity strength, Decreased endurance, Decreased functional mobility  Strengths: Support of extended family/friends  Plan:  Treatment Interventions: ADL retraining, Functional transfer training, UE strengthening/ROM, Equipment evaluation/education, Patient/family training  OT Frequency: 4 times per week  OT Discharge Recommendations: Low intensity level of continued care (recommend assist/supervision upon d/c)  OT - OK to Discharge: Yes  Treatment Interventions: ADL retraining, Functional transfer training, UE strengthening/ROM, Equipment evaluation/education, Patient/family training    Subjective     General:   OT Received On: 24  General  Reason for Referral: Pt is a 89 year-old F s/p pacemaker placement on 24  Past Medical History Relevant to Rehab: CHF, DM, HTN  Missed Visit: No  Missed Visit Reason: Other (Comment)  Family/Caregiver Present: No  Prior to Session Communication: Bedside nurse  Patient Position Received: Up in chair, Alarm off, not on at start of session  General Comment: Cleared by nursing. Pt pleasant and agreeable to therapy  Precautions:  Hearing/Visual Limitations: glasses; B hearing aids  Medical Precautions: Fall precautions  Post-Surgical Precautions: Other (comment) (pacemaker precautions)  Precautions Comment: silver bandage at right sided pacemaker site  Vital Signs:  Heart Rate:  (60-67)  Heart Rate Source:  Monitor  Pain:  Pain Assessment  Pain Assessment: 0-10  Pain Score: 0 - No pain    Objective   Cognition:  Overall Cognitive Status: Within Functional Limits           Home Living:  Type of Home: Condo  Lives With: Alone  Home Adaptive Equipment: Cane  Home Layout: One level  Home Access: Stairs to enter with rails  Entrance Stairs-Rails: Both  Entrance Stairs-Number of Steps: 2  Bathroom Shower/Tub: Walk-in shower  Bathroom Toilet: Standard  Bathroom Equipment: Grab bars in shower, Built-in shower seat, Long handled sponge  Prior Function:  Level of [place]: Independent with ADLs and functional transfers, Independent with homemaking with ambulation  Receives Help From: Family (family local and supportive; daughter to stay with patient upon d/c)  ADL Assistance: Independent  Homemaking Assistance: Independent  Ambulatory Assistance: Independent  Vocational: Retired  Hand Dominance: Right  Prior Function Comments: Pt denies h/o falls       ADL:  Eating Assistance: Independent  Grooming Assistance:  (set up)  Bathing Assistance: Minimal  UE Dressing Assistance:  (set up)  LE Dressing Assistance: Minimal  Toileting Assistance with Device: Minimal  Activities of Daily Living:      LE Dressing  LE Dressing: Yes  Sock Level of Assistance: Minimum assistance  Adult Briefs Level of Assistance: Minimum assistance (min A for donning/doffing briefs and pad)  LE Dressing Where Assessed: Chair  LE Dressing Comments: donning/doffing socks seated in chair    Toileting  Toileting Level of Assistance: Minimum assistance  Where Assessed: Toilet  Toileting Comments: min A for balance to perform jessie care in standing  Activity Tolerance:  Endurance: Decreased tolerance for upright activites  Functional Standing Tolerance:     Bed Mobility/Transfers: Bed Mobility  Bed Mobility: No    Transfers  Transfer:  (min A for balance and controlled descent sit<>stand x2 trials chair level, VCs for safe hand and leg placement)    Functional  Mobility:  Functional Mobility  Functional Mobility Performed:  (pt CGA for functional mobility short household distance with use of RW)       Standing Balance:  Static Standing Balance  Static Standing-Level of Assistance: Contact guard       Vision:Vision - Basic Assessment  Current Vision: Wears glasses all the time  Sensation:  Sensation Comment: pt denied numbness/paresthesia BUEs  Strength:  Strength Comments:  (LUE WFL; RUE NT formally)    Hand Function:  Hand Function  Gross Grasp: Functional  Coordination: Functional  Extremities: RUE   RUE :  (shoulder flexion at least 90 degreess; WFL distally) and LUE   LUE: Within Functional Limits    Outcome Measures: [facility] Daily Activity  Putting on and taking off regular lower body clothing: A little  Bathing (including washing, rinsing, drying): A little  Putting on and taking off regular upper body clothing: A little  Toileting, which includes using toilet, bedpan or urinal: A little  Taking care of personal grooming such as brushing teeth: A little  Eating Meals: None  Daily Activity - Total Score: 19    Education Documentation  ADL Training, taught by Luci Steinberg OT at 4/22/2024 11:13 AM.  Learner: Patient  Readiness: Acceptance  Method: Explanation, Demonstration  Response: Needs Reinforcement, Verbalizes Understanding    Education Comments  No comments found.      Goals:  Encounter Problems       Encounter Problems (Active)       ADLs       Pt will complete ADL tasks at mod I with use of AE prn  (Progressing)       Start:  04/22/24    Expected End:  05/13/24               Functional Mobility       Pt will perform functional mobility household distances at mod I with use of RW.    (Progressing)       Start:  04/22/24    Expected End:  05/13/24               OT Transfers       Pt will perform functional transfers at [facility] I (Progressing)       Start:  04/22/24    Expected End:  05/13/24

## 2024-04-22 NOTE — NURSING NOTE
Discharge completed with pt and dtr. Pacemaker Post care instructions reinforced and understood New meds. Pt received meds to beds. Also received walker ordered for home use.

## 2024-04-22 NOTE — NURSING NOTE
"Assumed care of patient.  Awakens easily, states feels \"weird\" and had a \"wild dream\" last night.  Previous nurse discussed that patient had elevated anxiety and high BP overnight and was given vistaril to help with that.  Patient assisted with ordering breakfast tray.  Assessed surgical site to R chest, surgical dressing in place.  Call light and commonly used items in reach.  Bed locked and in low position.   "

## 2024-04-22 NOTE — PROGRESS NOTES
[facility]. Pt may dc today. Pt declining HHC, daughter to stay with her and make sure she gets up and moves around as needed. Pt requesting walker at dc, [facility] made attending  aware of need for script for this. DC IM explained and electronically signed, copy given to pt.      04/22/24 0011   Discharge Planning   Patient expects to be discharged to: Home

## 2024-04-22 NOTE — DISCHARGE SUMMARY
Discharge Diagnosis  Atrial fibrillation with slow ventricular response (Multi)    Issues Requiring Follow-Up    Continue follow-up with electrophysiology.      Discharge Meds     Your medication list        START taking these medications        Instructions Last Dose Given Next Dose Due   erythromycin 5 mg/gram (0.5 %) ophthalmic ointment  Commonly known as: Romycin      Apply 1/2 inch to right eye every 6 hours.              CONTINUE taking these medications        Instructions Last Dose Given Next Dose Due   allopurinol 100 mg tablet  Commonly known as: Zyloprim           calcium carbonate-vitamin D3 500 mg-5 mcg (200 unit) tablet           losartan 50 mg tablet  Commonly known as: Cozaar      Take 1 tablet (50 mg) by mouth once daily.       melatonin 3 mg tablet      Take 1 tablet (3 mg) by mouth as needed at bedtime for sleep.       metFORMIN (OSM) 500 mg 24 hr tablet  Commonly known as: Fortamet           pantoprazole 40 mg EC tablet  Commonly known as: ProtoNix      Take 1 tablet (40 mg) by mouth once daily in the morning. Take before meals. Do not crush, chew, or split.       warfarin 5 mg tablet  Commonly known as: Coumadin                  STOP taking these medications      cefTRIAXone 1 gram/50 mL IVPB  Commonly known as: Rocephin        DOPamine 1,600 mcg/mL infusion        insulin lispro 100 unit/mL injection  Commonly known as: HumaLOG                  Where to Get Your Medications        These medications were sent to [facility]  [id number] [address] [id number]      Hours: 9 AM to 6 PM Mon-Fri, 9 AM to 1 PM Sat Phone: [phone]   erythromycin 5 mg/gram (0.5 %) ophthalmic ointment         Test Results Pending At Discharge  Pending Labs       No current pending labs.            Hospital Course       89-year-old female past medical history atrial fibrillation on beta-blocker presenting with bradycardia.  Was placed on dopamine drip.  Evaluated by electrophysiology and a  pacemaker was recommended the patient was transferred from [facility] to [facility].  Pacemaker successfully placed.  There was a question of a urinary tract infection, started ceftriaxone, and course was finished.  Urine culture eventually grew normal shruthi.  Right-sided conjunctivitis treated with erythromycin ointment and to finish course of total 7 days -end date April 27.  Home health care was declined by patient and family and she is discharged home with a prescription for a walker in the after mentioned erythromycin.  Patient was also started on a lower dose of metoprolol 12.5 mg twice a day for the atrial fibrillation.    Pertinent Physical Exam At Time of Discharge  Physical Exam    General: Elderly female, nontoxic, appears younger than chronologic age, sitting up in chair  Eyes: Clear sclera.  Some erythema of the right eyelid without major drainage  Cardio: Regular rate rhythm  Respiratory: Clear to auscultation      Outpatient Follow-Up  Future Appointments   Date Time Provider Department Center   5/16/2024 11:45 AM [id number] [id number] BERKLEY Rene DO

## 2024-04-23 ENCOUNTER — PATIENT OUTREACH (OUTPATIENT)
Dept: CARE COORDINATION | Facility: CLINIC | Age: 89
End: 2024-04-23
Payer: MEDICARE

## 2024-04-23 NOTE — PROGRESS NOTES
[facility]  Admitted 4/19/2024  Discharged 4/22/2024  Dx: Afib with bradycardia, UTI, right conjunctivitis  S/p: pacemaker    Female answered phone  CM introduced self and reason for call  Female voice stated, she was not available   Female voice stated, everything is wonderful  Female voice stated, she has a follow scheduled  Female voice ended call  CM unable to complete post discharge assessment    Mavis Barber RN/CM   [facility]  [phone]

## 2024-04-27 NOTE — DOCUMENTATION CLARIFICATION NOTE
"    PATIENT:               KOLBY PHAN  ACCT #:                  [id number]  MRN:                       [id number]  :                       3/24/1930  ADMIT DATE:       2024 5:26 AM  DISCH DATE:        2024 9:52 AM  RESPONDING PROVIDER #:        [id number]          PROVIDER RESPONSE TEXT:    Acute Diastolic Congestive Heart Failure    CDI QUERY TEXT:    Clarification        Instruction:    Based on your assessment of the patient and the clinical information, please provide the requested documentation by clicking on the appropriate radio button and enter any additional information if prompted.    Question: Please further clarify the type and acuity of congestive heart failure and troponin elevation relationship if any exists    When answering this query, please exercise your independent professional judgment. The fact that a question is being asked, does not imply that any particular answer is desired or expected.    The patient's clinical indicators include:  Clinical Information:  Patient admitted with poorly described symptoms of anxiety for which she got an ambulance found to be in heart failure with very slow ventricular rate in the setting of chronic A-fib on metoprolol.  She was admitted initially with the plan to stop her metoprolol will improve her heart rates but this did not happen electrophysiology evaluated the patient recommended pacemaker she is being transferred to [facility] for that purpose    Clinical Indicators:   ED PN:  \"BNP is elevated at 2806 and troponin mildly elevated at 24 will trend somewhat suggestive of type II demand/CHF.\"  \" Consider CHF due to low heart rate as patient borderline hypoxic at 90% on room air and has mild ankle edema.\"  \"Chest x-ray on my independent interpretation with small bilateral pleural effusions and pulmonary edema suggestive of CHF therefore patient given low-dose of IV Lasix.\"  -BNP 2806  -EKG   Atrial fibrillation with slow ventricular response at " "40 bpm,  CXR:  \"IMPRESSION:  1.Cardiomegaly with small bilateral pleural effusions and pulmonary  edema.  TTE: \"1.Left ventricular systolic function is normal with a 55-60% estimated ejection fraction.\"  \"There are no regional wall motion abnormalities\"    -Troponin:  24/24/29 4/17 H/P \"Chest x-ray showing CHF troponin 24 INR 2.2 proBNP 2000\"    4/17  Cards Consult:  \"1 symptoms of shortness of breath and dizziness secondary to chronotropic incompetence from underlying sick sinus syndrome superimposed on fact the patient was taking long-acting Toprol-XL prior to the hospitalization for recurrent of atrial fibrillation.  \"    Treatment:  Transfer to [facility] for insertion of pacemaker; Telemetry; Lasix; Dopamine IV-renal dose; Stopped Metoprolol; Cards Consult; EKG; CXR; TTE; 3L NC    Risk Factors:  Age; Afib; DM; Female; Bradyarrthymia  Options provided:  -- Acute Diastolic Congestive Heart Failure  -- Acute Type II MI 2/2 Acute Diastolic Congestive Heart Failure  -- Non-ischemic MI 2/2 Acute Diastolic Congestive Heart Failure  -- Other - I will add my own diagnosis  -- Refer to Clinical Documentation Reviewer    Query created by: Jocelyn Cano on 4/22/2024 10:29 AM      Electronically signed by:  DIONNA MERCADO MD 4/27/2024 8:29 AM          "

## 2024-04-30 LAB
ATRIAL RATE: 250 BPM
Q ONSET: 212 MS
QRS COUNT: 8 BEATS
QRS DURATION: 104 MS
QT INTERVAL: 472 MS
QTC CALCULATION(BAZETT): 426 MS
QTC FREDERICIA: 441 MS
R AXIS: -50 DEGREES
T AXIS: 134 DEGREES
T OFFSET: 448 MS
VENTRICULAR RATE: 49 BPM

## 2024-05-04 ENCOUNTER — HOSPITAL ENCOUNTER (INPATIENT)
Facility: HOSPITAL | Age: 89
LOS: 6 days | Discharge: NURSING, REHAB OR LONG-TERM CARE | DRG: 200 | End: 2024-05-10
Attending: ANESTHESIOLOGY | Admitting: ANESTHESIOLOGY
Payer: MEDICARE

## 2024-05-04 ENCOUNTER — APPOINTMENT (OUTPATIENT)
Dept: RADIOLOGY | Facility: HOSPITAL | Age: 89
DRG: 200 | End: 2024-05-04
Payer: MEDICARE

## 2024-05-04 ENCOUNTER — HOSPITAL ENCOUNTER (INPATIENT)
Facility: HOSPITAL | Age: 89
LOS: 1 days | Discharge: OTHER INSTITUTION | DRG: 200 | End: 2024-05-04
Attending: EMERGENCY MEDICINE | Admitting: INTERNAL MEDICINE
Payer: MEDICARE

## 2024-05-04 VITALS
SYSTOLIC BLOOD PRESSURE: 135 MMHG | HEART RATE: 73 BPM | RESPIRATION RATE: 17 BRPM | BODY MASS INDEX: 23.52 KG/M2 | DIASTOLIC BLOOD PRESSURE: 75 MMHG | HEIGHT: 64 IN | TEMPERATURE: 98.4 F | WEIGHT: 137.79 LBS | OXYGEN SATURATION: 97 %

## 2024-05-04 DIAGNOSIS — S27.0XXA TRAUMATIC PNEUMOTHORAX, INITIAL ENCOUNTER: ICD-10-CM

## 2024-05-04 DIAGNOSIS — S27.0XXD TRAUMATIC PNEUMOTHORAX, SUBSEQUENT ENCOUNTER: ICD-10-CM

## 2024-05-04 DIAGNOSIS — J93.9 PNEUMOTHORAX: Primary | ICD-10-CM
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
## 2024-05-04 DIAGNOSIS — S22.41XA CLOSED FRACTURE OF MULTIPLE RIBS OF RIGHT SIDE, INITIAL ENCOUNTER: ICD-10-CM

## 2024-05-04 DIAGNOSIS — S22.49XG: Primary | ICD-10-CM

## 2024-05-04 DIAGNOSIS — S42.301G: Primary | ICD-10-CM

## 2024-05-04 DIAGNOSIS — I48.91 ATRIAL FIBRILLATION, UNSPECIFIED TYPE (MULTI): ICD-10-CM
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
## 2024-05-04 LAB
ALBUMIN SERPL-MCNC: 3.8 G/DL (ref 3.5–5)
ALP BLD-CCNC: 61 U/L (ref 35–125)
ALT SERPL-CCNC: 15 U/L (ref 5–40)
ANION GAP SERPL CALC-SCNC: 11 MMOL/L
ANION GAP SERPL CALC-SCNC: 12 MMOL/L
APTT PPP: 29.3 SECONDS (ref 22–32.5)
APTT PPP: 31.3 SECONDS (ref 22–32.5)
AST SERPL-CCNC: 23 U/L (ref 5–40)
BASOPHILS # BLD AUTO: 0.08 X10*3/UL (ref 0–0.1)
BASOPHILS NFR BLD AUTO: 0.5 %
BILIRUB SERPL-MCNC: 0.6 MG/DL (ref 0.1–1.2)
BUN SERPL-MCNC: 34 MG/DL (ref 8–25)
BUN SERPL-MCNC: 34 MG/DL (ref 8–25)
CALCIUM SERPL-MCNC: 9.5 MG/DL (ref 8.5–10.4)
CALCIUM SERPL-MCNC: 9.8 MG/DL (ref 8.5–10.4)
CHLORIDE SERPL-SCNC: 102 MMOL/L (ref 97–107)
CHLORIDE SERPL-SCNC: 104 MMOL/L (ref 97–107)
CO2 SERPL-SCNC: 26 MMOL/L (ref 24–31)
CO2 SERPL-SCNC: 28 MMOL/L (ref 24–31)
CREAT SERPL-MCNC: 1.1 MG/DL (ref 0.4–1.6)
CREAT SERPL-MCNC: 1.3 MG/DL (ref 0.4–1.6)
EGFRCR SERPLBLD CKD-EPI 2021: 38 ML/MIN/1.73M*2
EGFRCR SERPLBLD CKD-EPI 2021: 47 ML/MIN/1.73M*2
EOSINOPHIL # BLD AUTO: 0.05 X10*3/UL (ref 0–0.4)
EOSINOPHIL NFR BLD AUTO: 0.3 %
ERYTHROCYTE [DISTWIDTH] IN BLOOD BY AUTOMATED COUNT: 14.3 % (ref 11.5–14.5)
FIBRINOGEN PPP-MCNC: 335 MG/DL (ref 200–400)
GLUCOSE BLD MANUAL STRIP-MCNC: 105 MG/DL (ref 74–99)
GLUCOSE BLD MANUAL STRIP-MCNC: 111 MG/DL (ref 74–99)
GLUCOSE BLD MANUAL STRIP-MCNC: 147 MG/DL (ref 74–99)
GLUCOSE SERPL-MCNC: 120 MG/DL (ref 65–99)
GLUCOSE SERPL-MCNC: 120 MG/DL (ref 65–99)
HCT VFR BLD AUTO: 39.7 % (ref 36–46)
HGB BLD-MCNC: 12.8 G/DL (ref 12–16)
IMM GRANULOCYTES # BLD AUTO: 0.07 X10*3/UL (ref 0–0.5)
IMM GRANULOCYTES NFR BLD AUTO: 0.5 % (ref 0–0.9)
INR PPP: 1.8 (ref 0.9–1.2)
INR PPP: 1.9 (ref 0.9–1.2)
LYMPHOCYTES # BLD AUTO: 1.56 X10*3/UL (ref 0.8–3)
LYMPHOCYTES NFR BLD AUTO: 10.5 %
MAGNESIUM SERPL-MCNC: 1.6 MG/DL (ref 1.6–3.1)
MCH RBC QN AUTO: 31.1 PG (ref 26–34)
MCHC RBC AUTO-ENTMCNC: 32.2 G/DL (ref 32–36)
MCV RBC AUTO: 97 FL (ref 80–100)
MONOCYTES # BLD AUTO: 0.98 X10*3/UL (ref 0.05–0.8)
MONOCYTES NFR BLD AUTO: 6.6 %
NEUTROPHILS # BLD AUTO: 12.16 X10*3/UL (ref 1.6–5.5)
NEUTROPHILS NFR BLD AUTO: 81.6 %
NRBC BLD-RTO: 0 /100 WBCS (ref 0–0)
PHOSPHATE SERPL-MCNC: 3.5 MG/DL (ref 2.5–4.5)
PLATELET # BLD AUTO: 188 X10*3/UL (ref 150–450)
POTASSIUM SERPL-SCNC: 4.1 MMOL/L (ref 3.4–5.1)
POTASSIUM SERPL-SCNC: 4.1 MMOL/L (ref 3.4–5.1)
PROT SERPL-MCNC: 6.8 G/DL (ref 5.9–7.9)
PROTHROMBIN TIME: 18.4 SECONDS (ref 9.3–12.7)
PROTHROMBIN TIME: 19 SECONDS (ref 9.3–12.7)
RBC # BLD AUTO: 4.11 X10*6/UL (ref 4–5.2)
SODIUM SERPL-SCNC: 141 MMOL/L (ref 133–145)
SODIUM SERPL-SCNC: 142 MMOL/L (ref 133–145)
WBC # BLD AUTO: 14.9 X10*3/UL (ref 4.4–11.3)

## 2024-05-04 PROCEDURE — 2500000004 HC RX 250 GENERAL PHARMACY W/ HCPCS (ALT 636 FOR OP/ED): Performed by: INTERNAL MEDICINE

## 2024-05-04 PROCEDURE — 70450 CT HEAD/BRAIN W/O DYE: CPT | Performed by: SURGERY

## 2024-05-04 PROCEDURE — 82947 ASSAY GLUCOSE BLOOD QUANT: CPT

## 2024-05-04 PROCEDURE — 80048 BASIC METABOLIC PNL TOTAL CA: CPT

## 2024-05-04 PROCEDURE — 71250 CT THORAX DX C-: CPT

## 2024-05-04 PROCEDURE — 71101 X-RAY EXAM UNILAT RIBS/CHEST: CPT | Mod: RT

## 2024-05-04 PROCEDURE — 99222 1ST HOSP IP/OBS MODERATE 55: CPT | Performed by: SURGERY

## 2024-05-04 PROCEDURE — 9420000001 HC RT PATIENT EDUCATION 5 MIN

## 2024-05-04 PROCEDURE — 72125 CT NECK SPINE W/O DYE: CPT

## 2024-05-04 PROCEDURE — 85025 COMPLETE CBC W/AUTO DIFF WBC: CPT | Performed by: EMERGENCY MEDICINE

## 2024-05-04 PROCEDURE — 99285 EMERGENCY DEPT VISIT HI MDM: CPT | Mod: 25

## 2024-05-04 PROCEDURE — 85730 THROMBOPLASTIN TIME PARTIAL: CPT | Performed by: EMERGENCY MEDICINE

## 2024-05-04 PROCEDURE — 1210000001 HC SEMI-PRIVATE ROOM DAILY

## 2024-05-04 PROCEDURE — 71101 X-RAY EXAM UNILAT RIBS/CHEST: CPT | Mod: RIGHT SIDE | Performed by: RADIOLOGY

## 2024-05-04 PROCEDURE — 85610 PROTHROMBIN TIME: CPT

## 2024-05-04 PROCEDURE — 36415 COLL VENOUS BLD VENIPUNCTURE: CPT | Performed by: EMERGENCY MEDICINE

## 2024-05-04 PROCEDURE — 72125 CT NECK SPINE W/O DYE: CPT | Performed by: SURGERY

## 2024-05-04 PROCEDURE — 2020000001 HC ICU ROOM DAILY

## 2024-05-04 PROCEDURE — 99291 CRITICAL CARE FIRST HOUR: CPT

## 2024-05-04 PROCEDURE — 2500000001 HC RX 250 WO HCPCS SELF ADMINISTERED DRUGS (ALT 637 FOR MEDICARE OP)

## 2024-05-04 PROCEDURE — 2500000005 HC RX 250 GENERAL PHARMACY W/O HCPCS

## 2024-05-04 PROCEDURE — 71045 X-RAY EXAM CHEST 1 VIEW: CPT | Performed by: RADIOLOGY

## 2024-05-04 PROCEDURE — 71045 X-RAY EXAM CHEST 1 VIEW: CPT

## 2024-05-04 PROCEDURE — 83735 ASSAY OF MAGNESIUM: CPT

## 2024-05-04 PROCEDURE — 0W9930Z DRAINAGE OF RIGHT PLEURAL CAVITY WITH DRAINAGE DEVICE, PERCUTANEOUS APPROACH: ICD-10-PCS | Performed by: EMERGENCY MEDICINE

## 2024-05-04 PROCEDURE — 36415 COLL VENOUS BLD VENIPUNCTURE: CPT

## 2024-05-04 PROCEDURE — 2500000004 HC RX 250 GENERAL PHARMACY W/ HCPCS (ALT 636 FOR OP/ED): Performed by: EMERGENCY MEDICINE

## 2024-05-04 PROCEDURE — 2500000004 HC RX 250 GENERAL PHARMACY W/ HCPCS (ALT 636 FOR OP/ED)

## 2024-05-04 PROCEDURE — 84100 ASSAY OF PHOSPHORUS: CPT

## 2024-05-04 PROCEDURE — 32551 INSERTION OF CHEST TUBE: CPT | Performed by: EMERGENCY MEDICINE

## 2024-05-04 PROCEDURE — 85610 PROTHROMBIN TIME: CPT | Performed by: EMERGENCY MEDICINE

## 2024-05-04 PROCEDURE — 80048 BASIC METABOLIC PNL TOTAL CA: CPT | Performed by: EMERGENCY MEDICINE

## 2024-05-04 PROCEDURE — 70450 CT HEAD/BRAIN W/O DYE: CPT

## 2024-05-04 PROCEDURE — 85384 FIBRINOGEN ACTIVITY: CPT

## 2024-05-04 PROCEDURE — 71250 CT THORAX DX C-: CPT | Performed by: SURGERY

## 2024-05-04 RX ORDER — METOPROLOL TARTRATE 25 MG/1
12.5 TABLET, FILM COATED ORAL 2 TIMES DAILY
Status: DISCONTINUED | OUTPATIENT
Start: 2024-05-04 | End: 2024-05-10 | Stop reason: HOSPADM

## 2024-05-04 RX ORDER — ALLOPURINOL 100 MG/1
100 TABLET ORAL NIGHTLY
COMMUNITY

## 2024-05-04 RX ORDER — MORPHINE SULFATE 4 MG/ML
4 INJECTION, SOLUTION INTRAMUSCULAR; INTRAVENOUS ONCE
Status: COMPLETED | OUTPATIENT
Start: 2024-05-04 | End: 2024-05-04

## 2024-05-04 RX ORDER — SODIUM CHLORIDE 9 MG/ML
75 INJECTION, SOLUTION INTRAVENOUS CONTINUOUS
Status: DISCONTINUED | OUTPATIENT
Start: 2024-05-04 | End: 2024-05-04 | Stop reason: HOSPADM

## 2024-05-04 RX ORDER — CALCIUM CARBONATE 500(1250)
1 TABLET ORAL DAILY
COMMUNITY

## 2024-05-04 RX ORDER — WARFARIN SODIUM 5 MG/1
5 TABLET ORAL DAILY
Status: DISCONTINUED | OUTPATIENT
Start: 2024-05-04 | End: 2024-05-07

## 2024-05-04 RX ORDER — DEXTROSE 50 % IN WATER (D50W) INTRAVENOUS SYRINGE
25
Status: DISCONTINUED | OUTPATIENT
Start: 2024-05-04 | End: 2024-05-10 | Stop reason: HOSPADM

## 2024-05-04 RX ORDER — METFORMIN HYDROCHLORIDE 500 MG/1
500 TABLET ORAL
COMMUNITY

## 2024-05-04 RX ORDER — CALCIUM CARBONATE 500(1250)
1250 TABLET ORAL DAILY
Status: DISCONTINUED | OUTPATIENT
Start: 2024-05-04 | End: 2024-05-10 | Stop reason: HOSPADM

## 2024-05-04 RX ORDER — LOSARTAN POTASSIUM 25 MG/1
12.5 TABLET ORAL DAILY
Status: DISCONTINUED | OUTPATIENT
Start: 2024-05-04 | End: 2024-05-10

## 2024-05-04 RX ORDER — WARFARIN SODIUM 5 MG/1
5 TABLET ORAL DAILY
COMMUNITY

## 2024-05-04 RX ORDER — ACETAMINOPHEN 160 MG/5ML
650 SOLUTION ORAL EVERY 4 HOURS PRN
Status: DISCONTINUED | OUTPATIENT
Start: 2024-05-04 | End: 2024-05-10 | Stop reason: HOSPADM

## 2024-05-04 RX ORDER — ONDANSETRON 4 MG/1
4 TABLET, ORALLY DISINTEGRATING ORAL EVERY 8 HOURS PRN
Status: DISCONTINUED | OUTPATIENT
Start: 2024-05-04 | End: 2024-05-10 | Stop reason: HOSPADM

## 2024-05-04 RX ORDER — ONDANSETRON HYDROCHLORIDE 2 MG/ML
4 INJECTION, SOLUTION INTRAVENOUS EVERY 8 HOURS PRN
Status: DISCONTINUED | OUTPATIENT
Start: 2024-05-04 | End: 2024-05-10 | Stop reason: HOSPADM

## 2024-05-04 RX ORDER — FUROSEMIDE 10 MG/ML
20 INJECTION INTRAMUSCULAR; INTRAVENOUS ONCE
Status: COMPLETED | OUTPATIENT
Start: 2024-05-04 | End: 2024-05-04

## 2024-05-04 RX ORDER — MAGNESIUM SULFATE HEPTAHYDRATE 40 MG/ML
2 INJECTION, SOLUTION INTRAVENOUS ONCE
Status: COMPLETED | OUTPATIENT
Start: 2024-05-04 | End: 2024-05-04

## 2024-05-04 RX ORDER — ONDANSETRON HYDROCHLORIDE 2 MG/ML
4 INJECTION, SOLUTION INTRAVENOUS ONCE
Status: COMPLETED | OUTPATIENT
Start: 2024-05-04 | End: 2024-05-04

## 2024-05-04 RX ORDER — LIDOCAINE 560 MG/1
1 PATCH PERCUTANEOUS; TOPICAL; TRANSDERMAL DAILY
Status: DISCONTINUED | OUTPATIENT
Start: 2024-05-04 | End: 2024-05-10 | Stop reason: HOSPADM

## 2024-05-04 RX ORDER — ENOXAPARIN SODIUM 100 MG/ML
30 INJECTION SUBCUTANEOUS DAILY
Status: DISCONTINUED | OUTPATIENT
Start: 2024-05-04 | End: 2024-05-09

## 2024-05-04 RX ORDER — POLYETHYLENE GLYCOL 3350 17 G/17G
17 POWDER, FOR SOLUTION ORAL DAILY PRN
Status: DISCONTINUED | OUTPATIENT
Start: 2024-05-04 | End: 2024-05-06

## 2024-05-04 RX ORDER — ALLOPURINOL 100 MG/1
100 TABLET ORAL NIGHTLY
Status: DISCONTINUED | OUTPATIENT
Start: 2024-05-04 | End: 2024-05-10 | Stop reason: HOSPADM

## 2024-05-04 RX ORDER — ACETAMINOPHEN 325 MG/1
650 TABLET ORAL EVERY 4 HOURS PRN
Status: DISCONTINUED | OUTPATIENT
Start: 2024-05-04 | End: 2024-05-10 | Stop reason: HOSPADM

## 2024-05-04 RX ORDER — DEXTROSE 50 % IN WATER (D50W) INTRAVENOUS SYRINGE
12.5
Status: DISCONTINUED | OUTPATIENT
Start: 2024-05-04 | End: 2024-05-10 | Stop reason: HOSPADM

## 2024-05-04 RX ORDER — KETOROLAC TROMETHAMINE 30 MG/ML
15 INJECTION, SOLUTION INTRAMUSCULAR; INTRAVENOUS EVERY 6 HOURS PRN
Status: DISCONTINUED | OUTPATIENT
Start: 2024-05-04 | End: 2024-05-05

## 2024-05-04 RX ORDER — SODIUM CHLORIDE 9 MG/ML
125 INJECTION, SOLUTION INTRAVENOUS CONTINUOUS
Status: DISCONTINUED | OUTPATIENT
Start: 2024-05-04 | End: 2024-05-04

## 2024-05-04 RX ORDER — ACETAMINOPHEN 325 MG/1
650 TABLET ORAL EVERY 6 HOURS
Status: DISCONTINUED | OUTPATIENT
Start: 2024-05-04 | End: 2024-05-04

## 2024-05-04 RX ORDER — INSULIN LISPRO 100 [IU]/ML
0-5 INJECTION, SOLUTION INTRAVENOUS; SUBCUTANEOUS
Status: DISCONTINUED | OUTPATIENT
Start: 2024-05-04 | End: 2024-05-10

## 2024-05-04 RX ORDER — LOSARTAN POTASSIUM 25 MG/1
12.5 TABLET ORAL DAILY
COMMUNITY

## 2024-05-04 RX ORDER — METOPROLOL TARTRATE 25 MG/1
12.5 TABLET, FILM COATED ORAL 2 TIMES DAILY
COMMUNITY

## 2024-05-04 RX ADMIN — ALLOPURINOL 100 MG: 100 TABLET ORAL at 20:52

## 2024-05-04 RX ADMIN — MORPHINE SULFATE 4 MG: 4 INJECTION, SOLUTION INTRAMUSCULAR; INTRAVENOUS at 02:38

## 2024-05-04 RX ADMIN — ONDANSETRON 4 MG: 2 INJECTION INTRAMUSCULAR; INTRAVENOUS at 02:37

## 2024-05-04 RX ADMIN — ACETAMINOPHEN 650 MG: 325 TABLET ORAL at 17:38

## 2024-05-04 RX ADMIN — METOPROLOL TARTRATE 12.5 MG: 25 TABLET, FILM COATED ORAL at 08:46

## 2024-05-04 RX ADMIN — ACETAMINOPHEN 650 MG: 325 TABLET ORAL at 08:18

## 2024-05-04 RX ADMIN — LIDOCAINE 1 PATCH: 4 PATCH TOPICAL at 17:38

## 2024-05-04 RX ADMIN — KETOROLAC TROMETHAMINE 15 MG: 30 INJECTION INTRAMUSCULAR; INTRAVENOUS at 11:28

## 2024-05-04 RX ADMIN — LOSARTAN POTASSIUM 12.5 MG: 25 TABLET, FILM COATED ORAL at 08:48

## 2024-05-04 RX ADMIN — SODIUM CHLORIDE 75 ML/HR: 9 INJECTION, SOLUTION INTRAVENOUS at 02:45

## 2024-05-04 RX ADMIN — MAGNESIUM SULFATE HEPTAHYDRATE 2 G: 40 INJECTION, SOLUTION INTRAVENOUS at 10:32

## 2024-05-04 RX ADMIN — FUROSEMIDE 20 MG: 10 INJECTION, SOLUTION INTRAMUSCULAR; INTRAVENOUS at 11:28

## 2024-05-04 RX ADMIN — ENOXAPARIN SODIUM 30 MG: 30 INJECTION SUBCUTANEOUS at 11:28

## 2024-05-04 RX ADMIN — METOPROLOL TARTRATE 12.5 MG: 25 TABLET, FILM COATED ORAL at 20:52

## 2024-05-04 RX ADMIN — MORPHINE SULFATE 4 MG: 4 INJECTION, SOLUTION INTRAMUSCULAR; INTRAVENOUS at 05:30

## 2024-05-04 RX ADMIN — KETOROLAC TROMETHAMINE 15 MG: 30 INJECTION INTRAMUSCULAR; INTRAVENOUS at 22:52

## 2024-05-04 SDOH — SOCIAL STABILITY: SOCIAL INSECURITY: DOES ANYONE TRY TO KEEP YOU FROM HAVING/CONTACTING OTHER FRIENDS OR DOING THINGS OUTSIDE YOUR HOME?: NO

## 2024-05-04 SDOH — SOCIAL STABILITY: SOCIAL INSECURITY: ABUSE: ADULT

## 2024-05-04 SDOH — SOCIAL STABILITY: SOCIAL INSECURITY: HAVE YOU HAD THOUGHTS OF HARMING ANYONE ELSE?: NO

## 2024-05-04 SDOH — SOCIAL STABILITY: SOCIAL INSECURITY: HAVE YOU HAD ANY THOUGHTS OF HARMING ANYONE ELSE?: NO

## 2024-05-04 SDOH — SOCIAL STABILITY: SOCIAL INSECURITY: ARE THERE ANY APPARENT SIGNS OF INJURIES/BEHAVIORS THAT COULD BE RELATED TO ABUSE/NEGLECT?: NO

## 2024-05-04 SDOH — SOCIAL STABILITY: SOCIAL INSECURITY: DO YOU FEEL ANYONE HAS EXPLOITED OR TAKEN ADVANTAGE OF YOU FINANCIALLY OR OF YOUR PERSONAL PROPERTY?: NO

## 2024-05-04 SDOH — SOCIAL STABILITY: SOCIAL INSECURITY: DO YOU FEEL UNSAFE GOING BACK TO THE PLACE WHERE YOU ARE LIVING?: NO

## 2024-05-04 SDOH — SOCIAL STABILITY: SOCIAL INSECURITY: WERE YOU ABLE TO COMPLETE ALL THE BEHAVIORAL HEALTH SCREENINGS?: YES

## 2024-05-04 SDOH — SOCIAL STABILITY: SOCIAL INSECURITY: ARE YOU OR HAVE YOU BEEN THREATENED OR ABUSED PHYSICALLY, EMOTIONALLY, OR SEXUALLY BY ANYONE?: NO

## 2024-05-04 SDOH — SOCIAL STABILITY: SOCIAL INSECURITY: HAS ANYONE EVER THREATENED TO HURT YOUR FAMILY OR YOUR PETS?: NO

## 2024-05-04 ASSESSMENT — COGNITIVE AND FUNCTIONAL STATUS - GENERAL
HELP NEEDED FOR BATHING: A LITTLE
CLIMB 3 TO 5 STEPS WITH RAILING: A LITTLE
DAILY ACTIVITIY SCORE: 24
PERSONAL GROOMING: A LITTLE
MOVING TO AND FROM BED TO CHAIR: A LITTLE
DRESSING REGULAR LOWER BODY CLOTHING: A LITTLE
DRESSING REGULAR UPPER BODY CLOTHING: A LITTLE
WALKING IN HOSPITAL ROOM: A LITTLE
DAILY ACTIVITIY SCORE: 19
PATIENT BASELINE BEDBOUND: NO
MOBILITY SCORE: 20
MOBILITY SCORE: 24
STANDING UP FROM CHAIR USING ARMS: A LITTLE
TOILETING: A LITTLE

## 2024-05-04 ASSESSMENT — PAIN SCALES - GENERAL
PAINLEVEL_OUTOF10: 5 - MODERATE PAIN
PAINLEVEL_OUTOF10: 3
PAINLEVEL_OUTOF10: 6
PAINLEVEL_OUTOF10: 7
PAINLEVEL_OUTOF10: 0 - NO PAIN
PAINLEVEL_OUTOF10: 8
PAINLEVEL_OUTOF10: 0 - NO PAIN
PAINLEVEL_OUTOF10: 0 - NO PAIN
PAINLEVEL_OUTOF10: 7

## 2024-05-04 ASSESSMENT — LIFESTYLE VARIABLES
SKIP TO QUESTIONS 9-10: 1
HOW OFTEN DO YOU HAVE 6 OR MORE DRINKS ON ONE OCCASION: NEVER
AUDIT-C TOTAL SCORE: 1
AUDIT-C TOTAL SCORE: 1
HOW OFTEN DO YOU HAVE A DRINK CONTAINING ALCOHOL: MONTHLY OR LESS
HOW MANY STANDARD DRINKS CONTAINING ALCOHOL DO YOU HAVE ON A TYPICAL DAY: 1 OR 2

## 2024-05-04 ASSESSMENT — PAIN DESCRIPTION - ORIENTATION
ORIENTATION: RIGHT
ORIENTATION: RIGHT

## 2024-05-04 ASSESSMENT — PAIN - FUNCTIONAL ASSESSMENT
PAIN_FUNCTIONAL_ASSESSMENT: 0-10

## 2024-05-04 ASSESSMENT — ACTIVITIES OF DAILY LIVING (ADL)
JUDGMENT_ADEQUATE_SAFELY_COMPLETE_DAILY_ACTIVITIES: YES
DRESSING YOURSELF: INDEPENDENT
ADEQUATE_TO_COMPLETE_ADL: YES
HEARING - LEFT EAR: HEARING AID
PATIENT'S MEMORY ADEQUATE TO SAFELY COMPLETE DAILY ACTIVITIES?: YES
HEARING - RIGHT EAR: HEARING AID
FEEDING YOURSELF: INDEPENDENT
GROOMING: INDEPENDENT
WALKS IN HOME: INDEPENDENT
TOILETING: INDEPENDENT
LACK_OF_TRANSPORTATION: NO
BATHING: INDEPENDENT
ASSISTIVE_DEVICE: WALKER

## 2024-05-04 ASSESSMENT — COLUMBIA-SUICIDE SEVERITY RATING SCALE - C-SSRS
2. HAVE YOU ACTUALLY HAD ANY THOUGHTS OF KILLING YOURSELF?: NO
1. IN THE PAST MONTH, HAVE YOU WISHED YOU WERE DEAD OR WISHED YOU COULD GO TO SLEEP AND NOT WAKE UP?: NO
6. HAVE YOU EVER DONE ANYTHING, STARTED TO DO ANYTHING, OR PREPARED TO DO ANYTHING TO END YOUR LIFE?: NO
1. IN THE PAST MONTH, HAVE YOU WISHED YOU WERE DEAD OR WISHED YOU COULD GO TO SLEEP AND NOT WAKE UP?: NO
2. HAVE YOU ACTUALLY HAD ANY THOUGHTS OF KILLING YOURSELF?: NO
6. HAVE YOU EVER DONE ANYTHING, STARTED TO DO ANYTHING, OR PREPARED TO DO ANYTHING TO END YOUR LIFE?: NO

## 2024-05-04 ASSESSMENT — PAIN DESCRIPTION - DESCRIPTORS
DESCRIPTORS: ACHING
DESCRIPTORS: THROBBING
DESCRIPTORS: ACHING
DESCRIPTORS: THROBBING

## 2024-05-04 ASSESSMENT — PAIN DESCRIPTION - PAIN TYPE: TYPE: ACUTE PAIN

## 2024-05-04 ASSESSMENT — PATIENT HEALTH QUESTIONNAIRE - PHQ9
1. LITTLE INTEREST OR PLEASURE IN DOING THINGS: NOT AT ALL
SUM OF ALL RESPONSES TO PHQ9 QUESTIONS 1 & 2: 0
2. FEELING DOWN, DEPRESSED OR HOPELESS: NOT AT ALL

## 2024-05-04 ASSESSMENT — PAIN DESCRIPTION - LOCATION
LOCATION: RIB CAGE
LOCATION: RIB CAGE

## 2024-05-04 NOTE — CONSULTS
"Reason For Consult  Fall with rib fractures and pneumothorax    History Of Present Illness  Audelia Virgen is a 89 y.o. female with a history of A-fib on Coumadin, type 2 diabetes, hypertension, heart failure, and recent placement of pacemaker on 4/19 who presented from home following a fall.  She initially presented [facility] and underwent workup.  She was found to have multiple right-sided rib fractures as well as a 20% pneumothorax.  A pigtail chest tube was placed, and she was transferred to [facility] for further management.  She was admitted to the ICU given her age, recent pacemaker placement, and multiple rib fractures with pneumothorax management.  This morning she complains of right-sided chest pain, but it is fairly well-controlled with lidocaine patch and ice packs.  She is saturating in the high 90's on 2 L nasal cannula.  Chest x-ray this morning shows a small persistent apical pneumothorax.  She denies striking her head or LOC.  She denies in pain in her extremities.       Past Medical History  She has no past medical history on file.    Surgical History  She has no past surgical history on file.     Social History  She reports that she has never smoked. She has never used smokeless tobacco. She reports current alcohol use. No history on file for drug use.    Family History  No family history on file.     Allergies  Patient has no known allergies.    Review of Systems  Negative except as indicated in HPI     Physical Exam  Gen: Alert and Oriented, no distress  Pulm: Breathing Comfortably on nasal cannula oxygen.  Chest tube with no air leak.    CV: RRR, non-tachycardic   Abd: Soft, appropriately tender to palpation.  Incisions well approximated  Extremities: No TTP, no deformities.         Last Recorded Vitals  Blood pressure (!) 163/95, pulse 78, temperature 36.7 °C (98.1 °F), temperature source Oral, resp. rate 19, height 1.626 m (5' 4.02\"), weight 58.7 kg (129 lb 6.6 oz), SpO2 " 95%.    Relevant Results  Lab Results   Component Value Date    WBC 14.9 (H) 05/04/2024    HGB 12.8 05/04/2024    HCT 39.7 05/04/2024    MCV 97 05/04/2024     05/04/2024     Lab Results   Component Value Date    GLUCOSE 120 (H) 05/04/2024    CALCIUM 9.5 05/04/2024     05/04/2024    K 4.1 05/04/2024    CO2 26 05/04/2024     05/04/2024    BUN 34 (H) 05/04/2024    CREATININE 1.10 05/04/2024     === 05/04/24 ===    CT CHEST WO IV CONTRAST    - Impression -  Acute displaced fractures of the lateral right 7th through 9th ribs.  Right lateral lower chest wall subcutaneous emphysema that extends  inferiorly beyond the extent of imaging.    There is a small to moderate-sized right pneumothorax the occupies  probably between 10 and 20 % of the volume of the right pleural  cavity. Trace right hemothorax. No pleural effusion or pneumothorax  on the left.    There is mild asymmetric ground-glass opacity in the right lower lobe  with mild subpleural consolidative opacity, which probably relates to  atelectasis, possibly with a component of pulmonary contusion.  Platelike and reticular opacity in the left lower lung probably  relates to atelectasis or scarring. No focal consolidation.    There is a background of pulmonary interstitial prominence with mild  smooth interlobular septal thickening in the could relate to chronic  changes with or without acute pulmonary interstitial edema/CHF.  Atherosclerosis, including coronary artery disease.    Marked cardiomegaly.    Circumferential mural thickening of the distal esophagus with  submucosal edema compatible with esophagitis; underlying neoplasm is  not excluded. Recommend endoscopic correlation or further evaluation  nonemergent outpatient fluoroscopic barium swallow exam. A yellow  alert was sent.    MACRO:  Critical Finding:  See findings. Notification was initiated on  5/4/2024 at 3:33 am by  Rigoberto Varma.  (**-YCF-**) Instructions:    Signed by: Rigoberto Varma  5/4/2024 3:33 AM  Dictation workstation:   [id number]       Assessment/Plan     The patient is a 89-year-old woman with multiple comorbidities who presents following a fall at home yesterday evening.  He was found to have multiple rib fractures as well as a right-sided pneumothorax.  A pigtail chest tube was placed, and she was transferred to [facility] for further management.  This morning her pain is fairly well-controlled.  Chest x-ray still shows a small apical pneumothorax.  No air leak.  -Recommend multimodal pain management with nonnarcotics, and judicious use of narcotic pain medicines.  Okay for lidocaine patches as well as ice packs as needed.  -Continue chest tube to -20 mmHg suction  -Repeat chest x-ray in the morning  -Ambulation  -Incentive spirometry and pulmonary toilet  -PT/OT  -May benefit from cardiology consult given her recent pacemaker placement.    I spent 30 minutes in the professional and overall care of this patient.      Matt Louie MD

## 2024-05-04 NOTE — ED PROVIDER NOTES
HPI   Chief Complaint   Patient presents with    Fall     Patient had mechanical fall at home after getting up without walker to use restroom. Patient stumbled and hit her dresser. Did not hit head, no LOC. Patient is now having right sided rib pain.        HPI  89-year-old female presents complaining of right-sided rib pain.  Patient states that she was walking to go to bed she lost her balance and she fell striking her ribs against a piece of furniture.  Happened shortly prior to arrival to emergency department.  She denies any dizziness or lightheadedness associated with this.  She denies any trauma to her head or neck.  Brought in by EMS.  States she has worsening pain if she takes a deep breath.  No abdominal pain.  6 out of 10 pain.  No other complaints.                  Juan Coma Scale Score: 15                     Patient History   No past medical history on file.  No past surgical history on file.  No family history on file.  Social History     Tobacco Use    Smoking status: Not on file    Smokeless tobacco: Not on file   Substance Use Topics    Alcohol use: Not on file    Drug use: Not on file       Physical Exam   ED Triage Vitals [05/04/24 0118]   Temperature Heart Rate Respirations BP   36.9 °C (98.4 °F) 91 18 (!) 174/105      Pulse Ox Temp Source Heart Rate Source Patient Position   (!) 89 % Temporal Monitor Lying      BP Location FiO2 (%)     Right arm --       Physical Exam  General:  Awake, alert, no acute distress.  Head: Normocephalic, Atraumatic  Neck: Supple, trachea midline, no stridor, no bony tenderness  Skin: Warm and dry, no rashes   Lungs: Clear to auscultation bilaterally no acute respiratory distress, speaking in full sentences without difficulty.  Tenderness palpation right lateral rib cage with crepitance.  CV: Regular Rate Rhythm with no obvious murmurs gallops rubs noted, no jugular venous distention, no pedal edema   Abdomen: Soft, nontender, nondistended, positive bowel sounds,  no peritoneal signs  Neuro:  No gross focal neurologic deficits, NIH is 0  Musculoskeletal:  Full range of motion in all 4 extremities  Psychiatric:  Alert oriented x 3, Good insight into condition.  ED Course & MDM   Diagnoses as of 05/04/24 0517   Closed multiple fractures of right upper extremity with ribs with delayed healing   Fall, initial encounter   Closed fracture of multiple ribs of right side, initial encounter   Traumatic pneumothorax, initial encounter       Medical Decision Making  Patient has seventh and eighth slightly displaced right-sided rib fractures.  Head and C-spine CTs are negative.  CT of the chest shows approximately a 20% right-sided pneumothorax.  I elected to place a pigtail chest tube for this pneumothorax.  Patient requires admission to the hospital.  There is no surgeon on-call.  I contacted the trauma surgeon Dr. Yee nascimento at [facility] who would consult on this patient but wanted her to be admitted to the ICU primarily.  I contacted the ICU ANGIE who accepted the transfer this patient to the ICU under the intensivist Dr. Mak.  Discussed the findings and plan with the patient and relatives at bedside.  She was treated with morphine.  She is stable for discharge.    33 minutes of total critical care time includes review of laboratory data, radiology results, discussion with consultants, and monitoring for potential decompensation.  Interventions performed as documented above.  Total care time is exclusive of any separately billable procedures    Procedure  Chest Tube Insertion    Performed by: Young Blankenship DO  Authorized by: Young Blankenship DO    Consent:     Consent obtained:  Verbal    Consent given by:  Patient    Risks, benefits, and alternatives were discussed: yes      Risks discussed:  Bleeding    Alternatives discussed:  No treatment  Universal protocol:     Procedure explained and questions answered to patient or proxy's satisfaction: yes      Test results available: yes       Imaging studies available: yes      Patient identity confirmed:  Verbally with patient  Pre-procedure details:     Skin preparation:  Chloroxylenol    Preparation: Patient was prepped and draped in the usual sterile fashion    Sedation:     Sedation type:  None  Anesthesia:     Anesthesia method:  Local infiltration    Local anesthetic:  Bupivacaine 0.5% w/o epi  Procedure details:     Placement location:  R anterior    Tube size (Fr):  Minicatheter    Ultrasound guidance: no      Tension pneumothorax: no      Tube connected to:  Suction    Drainage characteristics:  Bloody    Suture material:  2-0 silk  Post-procedure details:     Post-insertion x-ray findings: tube in good position      Procedure completion:  Tolerated       Young Blankenship DO  05/04/24 0531

## 2024-05-04 NOTE — H&P
[facility] Critical Care Medicine       Date:  5/4/2024  Patient:  Audelia Howard  YOB: 1930  MRN:  [id number]   Admit Date:  5/4/2024      Chief complaint: rib pain      History of Present Illness:  Audelia Virgen is a 89 y.o. year old female patient with Past Medical History of afib on warfarin, DM2, HTN, HFpEF, who presented to [facility] ED early this morning with reports of right-sided rib pain after a mechanical fall. She reports losing her balance and subsequently falling in to the kitchen counter and then to the ground. She did not hit her head or lose consciousness. She then was able to reach a phone and call EMS. On arrival to ED, she reported 6/10, worse with inspiration. She is more able to take deep breaths now after pigtail placement. She denies chest pain, SOB, cough, leg swelling, nausea, vomiting, diarrhea, constipation, abdominal pain, headache, LOC/seizures, vision changes, hearing changes. She does report fatigue and back pain which she reports the back pain usually occurs when she lays in bed for an extended period of time. Of note, she was recently admitted to [facility] 4/17-4/19 for afib with slow ventricular response. She had a single chamber pacemaker placed on 4/19 and was subsequently discharged home on 4/22.    ED Course at [facility]: Initial VS- /105 (128), HR 91, SpO2 89% on RA, RR 18, temp 36.9C. She was placed on O2 via NC and SpO2 96%. XR ribs- cardiomegaly with mild interstitial prominence could be interstitial edema, acute fractures of right 7th-9th ribs. She was given 4mg zofran, 650mg tylenol, and 4mg morphine with relief of pain. Labs notable for WBC 14.9 predominantly neutrophils, PT/INR 18.4/1.8, glucose 120, BUN/Cr 34/1.3. CT head and c-spine negative for acute process. CT chest- acute displaced fractures of the lateral right 7rth-9th ribs, RLL subcutaneous emphysema, small to moderate right PTX between 10-20$ of the volume of the right pleural caivty. Trace  "right hemothorax. No PTX on the left. Mild asymmetric GGO in RLL probably relating to atelectasis with a component of pulmonary contusion. CXR post-pigtail placement- persistent small right PTX.       Interval ICU Events:  5/4: pt arrived to ICU bed 19 from [facility] ED on 4L NC. VSS. Right-sided pigtail in place for PTX. She is currently complaining of 5/10 right-sided pain.     Medical History:  No past medical history on file.  No past surgical history on file.  No medications prior to admission.     Patient has no known allergies.     No family history on file.    Hospital Medications:        No current facility-administered medications for this encounter.    Review of Systems:  14 point review of systems was completed and negative except for those specially mention in my HPI    Physical Exam:    Heart Rate:  [66-91]   Temp:  [36.9 °C (98.4 °F)]   Resp:  [17-28]   BP: (134-174)/()   Height:  [162.6 cm (5' 4\")]   Weight:  [62.5 kg (137 lb 12.6 oz)]   SpO2:  [89 %-97 %]     Physical Exam  Constitutional:       General: She is not in acute distress.     Appearance: She is not ill-appearing or diaphoretic.   HENT:      Head: Normocephalic and atraumatic.      Mouth/Throat:      Mouth: Mucous membranes are dry.   Eyes:      Extraocular Movements: Extraocular movements intact.      Conjunctiva/sclera: Conjunctivae normal.      Pupils: Pupils are equal, round, and reactive to light.   Cardiovascular:      Rate and Rhythm: Normal rate. Rhythm irregular.      Pulses: Normal pulses.      Heart sounds: Normal heart sounds.   Pulmonary:      Effort: Pulmonary effort is normal. Tachypnea present. No respiratory distress.      Breath sounds: Decreased breath sounds present. No wheezing.   Chest:      Comments: Right-sided pigtail   Abdominal:      General: Bowel sounds are normal. There is no distension.      Palpations: Abdomen is soft.      Tenderness: There is no abdominal tenderness.   Musculoskeletal:      Right " lower leg: Edema present.      Left lower leg: Edema present.   Skin:     General: Skin is warm.      Capillary Refill: Capillary refill takes less than 2 seconds.      Comments: Aged related skin changes to extremities    Neurological:      General: No focal deficit present.      Mental Status: She is alert and oriented to person, place, and time.      Motor: Weakness present.         Objective:    I have reviewed all medications, laboratory results, and imaging pertinent for today's encounter.         No intake or output data in the 24 hours ending 05/04/24 0743      Assessment/Plan:    I am currently managing this critically ill patient for the following problems:    Neuro/Psych/Pain Ctrl/Sedation:  Pain control post mechanical fall   - Tylenol for mild pain  - Toradol for moderate pain  - CAM ICU qshift, sleep-wake hygiene, delirium precautions     Respiratory/ENT:  Pneumothorax   Hemothorax - trace right per Chest CT  Acute hypoxic respiratory insufficiency without respiratory failure   - Continue O2 via NC, currently on 4L  - Maintain SPO2 >92%  - Daily CXR with pigtail catheter in place   - Continuous pulse ox monitoring   - Pulm hygiene: encourage IS    Cardiovascular:  HTN  Afib on coumadin  S/p pacemaker placement - currently in paced rhythm with underlying afib   Hx Diastolic HF   - Lasix x1 today   - Consider cardiology c/s  - Continuous cardiac monitoring per ICU protocol  - Maintain MAPS >65  - EKGs PRN for ACS symptoms     GI:  No active concerns   - Diet: regular  - BR with miralax PRN  - Zofran PRN for nausea     Renal/Volume Status (Intra & Extravascular):  MARIA DEL ROSARIO on CKD  - Baseline Cr 0.9  - Cr 1.3 -> 1.1  - Maintain urine output 0.5-1.0cc/kg/hr  - Monitor I/O's  - Replete electrolytes to maintain K >4.0 and Mg >2.0  - Daily BMP, Mg, Phos    Endocrine  DM2 - on metformin outpt  - SSI #1 ACHs  - Hypoglycemia protocol     Infectious Disease:  Leukocytosis, predominantly neutrophils - likely stress  reaction   - Antibiotics: not indicated at this time as pt does not meet SIRS criteria and is not showing other signs of infection   - Daily CBC to trend WBCs   - Monitor SIRS criteria    Heme/Onc:  Subtherapeutic INR - 1.8   - Holding warfarin  - Monitor for s/sx of anemia such as bleeding and bruising   - Transfuse if Hgb <7.0   - Daily CBC    MSK:  Mechanical fall   Acute displaced rib fractures - 7th-9th ribs right-sided   - ACS consulted, appreciate recommendations   - Padded pressure points   - Ambulatory at baseline  - PT/OT when appropriate     Derm:  - ICU skin protocol    Ethics/Code Status:  Full code - discussed this in detail with patient and daughter Alissa at bedside. Alissa is POA and paperwork is documented in her chart and will be readily available with chart merge. Pt actual MRN [id number]. Alissa contact info- [phone].    :  DVT Prophylaxis: SCDs, lovenox  GI Prophylaxis: none  Bowel Regimen: miralax PRN  Diet: regular  CVC: none  Tanisha: none  Morales: none  Restraints: none  Dispo: ICU     Critical Care Time:  70 minutes spent in preparing to see patient (I.e.labs,imaging, etc.), documentation, discussion plan of care with patient/family/caregiver, and/ or coordination of care with multidisciplinary team including the attending. Time does not include completion of procedure time.     Plan discussed with Dr. Mak.     Wilfredo Valle PAWilmerC  Pulmonology & Critical Care Medicine   [facility]

## 2024-05-04 NOTE — PROGRESS NOTES
Occupational Therapy                 Therapy Communication Note    Patient Name: Audelia Virgen  MRN: [id number]  Today's Date: 5/4/2024     Discipline: Occupational Therapy    Missed Visit Reason: Missed Visit Reason: Patient placed on medical hold (Orders received, chart reviewed. Spoke with RN via SecureChat, requesting hold this date as pt is uncontrolled. Will f/u as schedule permits and pain control has improved.)    Missed Time: Cancel

## 2024-05-05 ENCOUNTER — APPOINTMENT (OUTPATIENT)
Dept: RADIOLOGY | Facility: HOSPITAL | Age: 89
End: 2024-05-05
Payer: MEDICARE

## 2024-05-05 LAB
ANION GAP SERPL CALC-SCNC: 9 MMOL/L
BASOPHILS # BLD AUTO: 0.06 X10*3/UL (ref 0–0.1)
BASOPHILS NFR BLD AUTO: 0.7 %
BUN SERPL-MCNC: 44 MG/DL (ref 8–25)
CALCIUM SERPL-MCNC: 8.8 MG/DL (ref 8.5–10.4)
CHLORIDE SERPL-SCNC: 103 MMOL/L (ref 97–107)
CO2 SERPL-SCNC: 28 MMOL/L (ref 24–31)
CREAT SERPL-MCNC: 1.4 MG/DL (ref 0.4–1.6)
EGFRCR SERPLBLD CKD-EPI 2021: 35 ML/MIN/1.73M*2
EOSINOPHIL # BLD AUTO: 0.14 X10*3/UL (ref 0–0.4)
EOSINOPHIL NFR BLD AUTO: 1.7 %
ERYTHROCYTE [DISTWIDTH] IN BLOOD BY AUTOMATED COUNT: 14.6 % (ref 11.5–14.5)
GLUCOSE BLD MANUAL STRIP-MCNC: 90 MG/DL (ref 74–99)
GLUCOSE SERPL-MCNC: 102 MG/DL (ref 65–99)
HCT VFR BLD AUTO: 32.4 % (ref 36–46)
HGB BLD-MCNC: 10.6 G/DL (ref 12–16)
IMM GRANULOCYTES # BLD AUTO: 0.02 X10*3/UL (ref 0–0.5)
IMM GRANULOCYTES NFR BLD AUTO: 0.2 % (ref 0–0.9)
INR PPP: 1.8 (ref 0.9–1.2)
LYMPHOCYTES # BLD AUTO: 1.65 X10*3/UL (ref 0.8–3)
LYMPHOCYTES NFR BLD AUTO: 19.6 %
MAGNESIUM SERPL-MCNC: 2.1 MG/DL (ref 1.6–3.1)
MCH RBC QN AUTO: 32 PG (ref 26–34)
MCHC RBC AUTO-ENTMCNC: 32.7 G/DL (ref 32–36)
MCV RBC AUTO: 98 FL (ref 80–100)
MONOCYTES # BLD AUTO: 1.03 X10*3/UL (ref 0.05–0.8)
MONOCYTES NFR BLD AUTO: 12.2 %
NEUTROPHILS # BLD AUTO: 5.52 X10*3/UL (ref 1.6–5.5)
NEUTROPHILS NFR BLD AUTO: 65.6 %
NRBC BLD-RTO: 0 /100 WBCS (ref 0–0)
PLATELET # BLD AUTO: 141 X10*3/UL (ref 150–450)
POTASSIUM SERPL-SCNC: 4 MMOL/L (ref 3.4–5.1)
PROTHROMBIN TIME: 18.5 SECONDS (ref 9.3–12.7)
RBC # BLD AUTO: 3.31 X10*6/UL (ref 4–5.2)
SODIUM SERPL-SCNC: 140 MMOL/L (ref 133–145)
WBC # BLD AUTO: 8.4 X10*3/UL (ref 4.4–11.3)

## 2024-05-05 PROCEDURE — 2500000001 HC RX 250 WO HCPCS SELF ADMINISTERED DRUGS (ALT 637 FOR MEDICARE OP)

## 2024-05-05 PROCEDURE — 2500000005 HC RX 250 GENERAL PHARMACY W/O HCPCS

## 2024-05-05 PROCEDURE — 85610 PROTHROMBIN TIME: CPT

## 2024-05-05 PROCEDURE — 71045 X-RAY EXAM CHEST 1 VIEW: CPT | Performed by: RADIOLOGY

## 2024-05-05 PROCEDURE — 36415 COLL VENOUS BLD VENIPUNCTURE: CPT

## 2024-05-05 PROCEDURE — 2500000004 HC RX 250 GENERAL PHARMACY W/ HCPCS (ALT 636 FOR OP/ED)

## 2024-05-05 PROCEDURE — 82947 ASSAY GLUCOSE BLOOD QUANT: CPT

## 2024-05-05 PROCEDURE — 97530 THERAPEUTIC ACTIVITIES: CPT | Mod: GP

## 2024-05-05 PROCEDURE — 2500000004 HC RX 250 GENERAL PHARMACY W/ HCPCS (ALT 636 FOR OP/ED): Performed by: HOSPITALIST

## 2024-05-05 PROCEDURE — 97162 PT EVAL MOD COMPLEX 30 MIN: CPT | Mod: GP

## 2024-05-05 PROCEDURE — 99291 CRITICAL CARE FIRST HOUR: CPT

## 2024-05-05 PROCEDURE — 85025 COMPLETE CBC W/AUTO DIFF WBC: CPT

## 2024-05-05 PROCEDURE — 99231 SBSQ HOSP IP/OBS SF/LOW 25: CPT | Performed by: SURGERY

## 2024-05-05 PROCEDURE — 83735 ASSAY OF MAGNESIUM: CPT

## 2024-05-05 PROCEDURE — 80048 BASIC METABOLIC PNL TOTAL CA: CPT

## 2024-05-05 PROCEDURE — 2060000001 HC INTERMEDIATE ICU ROOM DAILY

## 2024-05-05 RX ORDER — OXYCODONE HYDROCHLORIDE 5 MG/1
10 TABLET ORAL ONCE
Status: COMPLETED | OUTPATIENT
Start: 2024-05-05 | End: 2024-05-05

## 2024-05-05 RX ORDER — PROCHLORPERAZINE EDISYLATE 5 MG/ML
5 INJECTION INTRAMUSCULAR; INTRAVENOUS EVERY 6 HOURS PRN
Status: DISCONTINUED | OUTPATIENT
Start: 2024-05-05 | End: 2024-05-10 | Stop reason: HOSPADM

## 2024-05-05 RX ORDER — OXYCODONE HYDROCHLORIDE 5 MG/1
5 TABLET ORAL EVERY 4 HOURS PRN
Status: DISCONTINUED | OUTPATIENT
Start: 2024-05-05 | End: 2024-05-06

## 2024-05-05 RX ORDER — INSULIN LISPRO 100 [IU]/ML
0-5 INJECTION, SOLUTION INTRAVENOUS; SUBCUTANEOUS
Status: DISCONTINUED | OUTPATIENT
Start: 2024-05-05 | End: 2024-05-05

## 2024-05-05 RX ADMIN — ENOXAPARIN SODIUM 30 MG: 30 INJECTION SUBCUTANEOUS at 08:31

## 2024-05-05 RX ADMIN — ACETAMINOPHEN 650 MG: 325 TABLET ORAL at 08:32

## 2024-05-05 RX ADMIN — METOPROLOL TARTRATE 12.5 MG: 25 TABLET, FILM COATED ORAL at 08:31

## 2024-05-05 RX ADMIN — LIDOCAINE 1 PATCH: 4 PATCH TOPICAL at 08:31

## 2024-05-05 RX ADMIN — ALLOPURINOL 100 MG: 100 TABLET ORAL at 21:00

## 2024-05-05 RX ADMIN — LOSARTAN POTASSIUM 12.5 MG: 25 TABLET, FILM COATED ORAL at 08:31

## 2024-05-05 RX ADMIN — ONDANSETRON HYDROCHLORIDE 4 MG: 2 INJECTION INTRAMUSCULAR; INTRAVENOUS at 12:41

## 2024-05-05 RX ADMIN — ACETAMINOPHEN 650 MG: 325 TABLET ORAL at 20:07

## 2024-05-05 RX ADMIN — ACETAMINOPHEN 650 MG: 325 TABLET ORAL at 01:16

## 2024-05-05 RX ADMIN — PROCHLORPERAZINE EDISYLATE 5 MG: 5 INJECTION INTRAMUSCULAR; INTRAVENOUS at 16:09

## 2024-05-05 RX ADMIN — OXYCODONE 10 MG: 5 TABLET ORAL at 12:05

## 2024-05-05 RX ADMIN — METOPROLOL TARTRATE 12.5 MG: 25 TABLET, FILM COATED ORAL at 20:07

## 2024-05-05 RX ADMIN — KETOROLAC TROMETHAMINE 15 MG: 30 INJECTION INTRAMUSCULAR; INTRAVENOUS at 05:53

## 2024-05-05 SDOH — SOCIAL STABILITY: SOCIAL NETWORK: ARE YOU MARRIED, WIDOWED, DIVORCED, SEPARATED, NEVER MARRIED, OR LIVING WITH A PARTNER?: WIDOWED

## 2024-05-05 SDOH — HEALTH STABILITY: MENTAL HEALTH: HOW OFTEN DO YOU HAVE 6 OR MORE DRINKS ON ONE OCCASION?: NEVER

## 2024-05-05 SDOH — SOCIAL STABILITY: SOCIAL INSECURITY: WITHIN THE LAST YEAR, HAVE YOU BEEN HUMILIATED OR EMOTIONALLY ABUSED IN OTHER WAYS BY YOUR PARTNER OR EX-PARTNER?: NO

## 2024-05-05 SDOH — ECONOMIC STABILITY: FOOD INSECURITY: WITHIN THE PAST 12 MONTHS, YOU WORRIED THAT YOUR FOOD WOULD RUN OUT BEFORE YOU GOT MONEY TO BUY MORE.: NEVER TRUE

## 2024-05-05 SDOH — HEALTH STABILITY: MENTAL HEALTH
HOW OFTEN DO YOU NEED TO HAVE SOMEONE HELP YOU WHEN YOU READ INSTRUCTIONS, PAMPHLETS, OR OTHER WRITTEN MATERIAL FROM YOUR DOCTOR OR PHARMACY?: SOMETIMES

## 2024-05-05 SDOH — ECONOMIC STABILITY: INCOME INSECURITY: IN THE PAST 12 MONTHS, HAS THE ELECTRIC, GAS, OIL, OR WATER COMPANY THREATENED TO SHUT OFF SERVICE IN YOUR HOME?: NO

## 2024-05-05 SDOH — SOCIAL STABILITY: SOCIAL NETWORK: HOW OFTEN DO YOU ATTENT MEETINGS OF THE CLUB OR ORGANIZATION YOU BELONG TO?: MORE THAN 4 TIMES PER YEAR

## 2024-05-05 SDOH — SOCIAL STABILITY: SOCIAL NETWORK: HOW OFTEN DO YOU GET TOGETHER WITH FRIENDS OR RELATIVES?: THREE TIMES A WEEK

## 2024-05-05 SDOH — HEALTH STABILITY: MENTAL HEALTH: HOW OFTEN DO YOU HAVE A DRINK CONTAINING ALCOHOL?: MONTHLY OR LESS

## 2024-05-05 SDOH — ECONOMIC STABILITY: FOOD INSECURITY: WITHIN THE PAST 12 MONTHS, THE FOOD YOU BOUGHT JUST DIDN'T LAST AND YOU DIDN'T HAVE MONEY TO GET MORE.: NEVER TRUE

## 2024-05-05 SDOH — ECONOMIC STABILITY: HOUSING INSECURITY: IN THE LAST 12 MONTHS, HOW MANY PLACES HAVE YOU LIVED?: 1

## 2024-05-05 SDOH — HEALTH STABILITY: PHYSICAL HEALTH: ON AVERAGE, HOW MANY MINUTES DO YOU ENGAGE IN EXERCISE AT THIS LEVEL?: 20 MIN

## 2024-05-05 SDOH — HEALTH STABILITY: MENTAL HEALTH: HOW MANY STANDARD DRINKS CONTAINING ALCOHOL DO YOU HAVE ON A TYPICAL DAY?: 1 OR 2

## 2024-05-05 SDOH — SOCIAL STABILITY: SOCIAL INSECURITY: WITHIN THE LAST YEAR, HAVE YOU BEEN AFRAID OF YOUR PARTNER OR EX-PARTNER?: NO

## 2024-05-05 SDOH — ECONOMIC STABILITY: INCOME INSECURITY: IN THE LAST 12 MONTHS, WAS THERE A TIME WHEN YOU WERE NOT ABLE TO PAY THE MORTGAGE OR RENT ON TIME?: NO

## 2024-05-05 SDOH — ECONOMIC STABILITY: INCOME INSECURITY: HOW HARD IS IT FOR YOU TO PAY FOR THE VERY BASICS LIKE FOOD, HOUSING, MEDICAL CARE, AND HEATING?: NOT HARD AT ALL

## 2024-05-05 SDOH — HEALTH STABILITY: PHYSICAL HEALTH: ON AVERAGE, HOW MANY DAYS PER WEEK DO YOU ENGAGE IN MODERATE TO STRENUOUS EXERCISE (LIKE A BRISK WALK)?: 4 DAYS

## 2024-05-05 SDOH — SOCIAL STABILITY: SOCIAL NETWORK: HOW OFTEN DO YOU ATTEND CHURCH OR RELIGIOUS SERVICES?: NEVER

## 2024-05-05 ASSESSMENT — ACTIVITIES OF DAILY LIVING (ADL)
ADLS_ADDRESSED: YES
ADL_ASSISTANCE: INDEPENDENT

## 2024-05-05 ASSESSMENT — PAIN SCALES - GENERAL
PAINLEVEL_OUTOF10: 0 - NO PAIN
PAINLEVEL_OUTOF10: 5 - MODERATE PAIN
PAINLEVEL_OUTOF10: 0 - NO PAIN
PAINLEVEL_OUTOF10: 5 - MODERATE PAIN
PAINLEVEL_OUTOF10: 0 - NO PAIN
PAINLEVEL_OUTOF10: 3
PAINLEVEL_OUTOF10: 4
PAINLEVEL_OUTOF10: 10 - WORST POSSIBLE PAIN
PAINLEVEL_OUTOF10: 6

## 2024-05-05 ASSESSMENT — PAIN - FUNCTIONAL ASSESSMENT
PAIN_FUNCTIONAL_ASSESSMENT: 0-10

## 2024-05-05 ASSESSMENT — PAIN DESCRIPTION - DESCRIPTORS
DESCRIPTORS: ACHING

## 2024-05-05 ASSESSMENT — PAIN DESCRIPTION - LOCATION
LOCATION: RIB CAGE
LOCATION: CHEST

## 2024-05-05 ASSESSMENT — COGNITIVE AND FUNCTIONAL STATUS - GENERAL
CLIMB 3 TO 5 STEPS WITH RAILING: TOTAL
TURNING FROM BACK TO SIDE WHILE IN FLAT BAD: A LITTLE
MOBILITY SCORE: 16
MOVING TO AND FROM BED TO CHAIR: A LITTLE
STANDING UP FROM CHAIR USING ARMS: A LITTLE
MOVING FROM LYING ON BACK TO SITTING ON SIDE OF FLAT BED WITH BEDRAILS: A LITTLE
WALKING IN HOSPITAL ROOM: A LITTLE

## 2024-05-05 ASSESSMENT — LIFESTYLE VARIABLES
AUDIT-C TOTAL SCORE: 1
SKIP TO QUESTIONS 9-10: 1

## 2024-05-05 ASSESSMENT — PAIN DESCRIPTION - ORIENTATION: ORIENTATION: RIGHT

## 2024-05-05 NOTE — PROGRESS NOTES
05/05/24 0947   [facility] Disability Status   Are you deaf or do you have serious difficulty hearing? Y  (wears hearing aides)   Are you blind or do you have serious difficulty seeing, even when wearing glasses? N   Because of a physical, mental, or emotional condition, do you have serious difficulty concentrating, remembering, or making decisions? (5 years old or older) N   Do you have serious difficulty walking or climbing stairs? N   Do you have serious difficulty dressing or bathing? N   Because of a physical, mental, or emotional condition, do you have serious difficulty doing errands alone such as visiting the doctor? N

## 2024-05-05 NOTE — PROGRESS NOTES
05/05/24 0943   Discharge Planning   Living Arrangements Alone   Support Systems Children;Friends/neighbors   Assistance Needed Not yet determined   Type of Residence Private residence   Number of Stairs to Enter Residence 0   Number of Stairs Within Residence 0   Do you have animals or pets at home? No   Who is requesting discharge planning? Provider   Home or Post Acute Services None   Patient expects to be discharged to: home   Does the patient need discharge transport arranged? No   Patient Choice   Provider Choice list and CMS website (https://medicare.gov/care-compare#search) for post-acute Quality and Resource Measure Data were provided and reviewed with: Other (Comment)  (needs not yet determined)   Patient / Family choosing to utilize agency / facility established prior to hospitalization No

## 2024-05-05 NOTE — PROGRESS NOTES
Critical Care Medicine       Date:  5/5/2024  Patient:  Audelia Virgen  YOB: 1930  MRN:  [id number]   Admit Date:  5/4/2024      No chief complaint on file.        History of Present Illness:  Audelia Virgen is a 89 y.o. year old female patient with Past Medical History of afib on warfarin, DM2, HTN, HFpEF, who presented to [facility] ED early this morning with reports of right-sided rib pain after a mechanical fall. She reports losing her balance and subsequently falling in to the kitchen counter and then to the ground. She did not hit her head or lose consciousness. She then was able to reach a phone and call EMS. On arrival to ED, she reported 6/10, worse with inspiration. She is more able to take deep breaths now after pigtail placement. She denies chest pain, SOB, cough, leg swelling, nausea, vomiting, diarrhea, constipation, abdominal pain, headache, LOC/seizures, vision changes, hearing changes. She does report fatigue and back pain which she reports the back pain usually occurs when she lays in bed for an extended period of time. Of note, she was recently admitted to [facility] 4/17-4/19 for afib with slow ventricular response. She had a single chamber pacemaker placed on 4/19 and was subsequently discharged home on 4/22.     ED Course at [facility]: Initial VS- /105 (128), HR 91, SpO2 89% on RA, RR 18, temp 36.9C. She was placed on O2 via NC and SpO2 96%. XR ribs- cardiomegaly with mild interstitial prominence could be interstitial edema, acute fractures of right 7th-9th ribs. She was given 4mg zofran, 650mg tylenol, and 4mg morphine with relief of pain. Labs notable for WBC 14.9 predominantly neutrophils, PT/INR 18.4/1.8, glucose 120, BUN/Cr 34/1.3. CT head and c-spine negative for acute process. CT chest- acute displaced fractures of the lateral right 7rth-9th ribs, RLL subcutaneous emphysema, small to moderate right PTX between 10-20$ of the volume of the right pleural caivty. Trace  right hemothorax. No PTX on the left. Mild asymmetric GGO in RLL probably relating to atelectasis with a component of pulmonary contusion. CXR post-pigtail placement- persistent small right PTX.         Interval ICU Events:  5/4: pt arrived to ICU bed 19 from [facility] ED on 4L NC. VSS. Right-sided pigtail in place for PTX. She is currently complaining of 5/10 right-sided pain.    5/5: SINAN overnight. Minimal chest tube drainage. Her pain waxes and wanes. She is on 2L NC. If CXR good tomorrow AM chest tube can be removed. Plan to transfer patient to the floor today.     Objective     History reviewed. No pertinent past medical history.  History reviewed. No pertinent surgical history.  Medications Prior to Admission   Medication Sig Dispense Refill Last Dose    allopurinol (Zyloprim) 100 mg tablet Take 1 tablet (100 mg) by mouth once daily at bedtime.       calcium carbonate (Oscal) 500 mg calcium (1,250 mg) tablet Take 1 tablet (1,250 mg) by mouth once daily.       losartan (Cozaar) 25 mg tablet Take 0.5 tablets (12.5 mg) by mouth once daily.       metFORMIN (Glucophage) 500 mg tablet Take 1 tablet (500 mg) by mouth once daily with breakfast.       metoprolol tartrate (Lopressor) 25 mg tablet Take 0.5 tablets (12.5 mg) by mouth 2 times a day.       warfarin (Coumadin) 5 mg tablet Take 1 tablet (5 mg) by mouth once daily. Take as directed per After Visit Summary.        Patient has no known allergies.  Social History     Tobacco Use    Smoking status: Never    Smokeless tobacco: Never   Substance Use Topics    Alcohol use: Yes     Comment: Occasional     No family history on file.    Hospital Medications:           Current Facility-Administered Medications:     acetaminophen (Tylenol) oral liquid 650 mg, 650 mg, oral, q4h PRN **OR** acetaminophen (Tylenol) tablet 650 mg, 650 mg, oral, q4h PRN, Wilfredo Valle PA-C, 650 mg at 05/05/24 0116    allopurinol (Zyloprim) tablet 100 mg, 100 mg, oral, Nightly, Wilfredo COOPER  ALEXY Valle, 100 mg at 05/04/24 2052    [Held by provider] calcium carbonate (Oscal) tablet 1,250 mg, 1,250 mg, oral, Daily, Wilfredo Valle PA-C    dextrose 50 % injection 12.5 g, 12.5 g, intravenous, q15 min PRN, Wilfredo Valle PA-C    dextrose 50 % injection 25 g, 25 g, intravenous, q15 min PRN, Wilfredo Valle PA-C    enoxaparin (Lovenox) syringe 30 mg, 30 mg, subcutaneous, Daily, Wilfredo Valle PA-C, 30 mg at 05/04/24 1128    glucagon (Glucagen) injection 1 mg, 1 mg, intramuscular, q15 min PRN, Wilfredo Valle PA-C    glucagon (Glucagen) injection 1 mg, 1 mg, intramuscular, q15 min PRN, Wilfredo Valle PA-C    insulin lispro (HumaLOG) injection 0-5 Units, 0-5 Units, subcutaneous, Before meals & nightly, Wilfredo Valle PA-C    ketorolac (Toradol) injection 15 mg, 15 mg, intravenous, q6h PRN, Wilfredo Valle PA-C, 15 mg at 05/05/24 0553    lidocaine 4 % patch 1 patch, 1 patch, transdermal, Daily, Wilfredo Valle PA-C, 1 patch at 05/04/24 1738    losartan (Cozaar) tablet 12.5 mg, 12.5 mg, oral, Daily, Wilfredo Valle PA-C, 12.5 mg at 05/04/24 0848    metoprolol tartrate (Lopressor) tablet 12.5 mg, 12.5 mg, oral, BID, Wilfredo Valle PA-C, 12.5 mg at 05/04/24 2052    ondansetron ODT (Zofran-ODT) disintegrating tablet 4 mg, 4 mg, oral, q8h PRN **OR** ondansetron (Zofran) injection 4 mg, 4 mg, intravenous, q8h PRN, Wilfredo Valle PA-C    oxygen (O2) therapy, , inhalation, Continuous PRN - O2/gases, Wilfredo Valle PA-C    polyethylene glycol (Glycolax, Miralax) packet 17 g, 17 g, oral, Daily PRN, Wilfredo Valle PA-C    [Held by provider] warfarin (Coumadin) tablet 5 mg, 5 mg, oral, Daily, Wilfredo Valle PA-C    Physical Exam:    Heart Rate:  [60-86]   Temp:  [36.3 °C (97.3 °F)-36.7 °C (98.1 °F)]   Resp:  [11-28]   BP: (101-160)/()   Weight:  [59.3 kg (130 lb 11.7 oz)]   SpO2:  [91 %-99 %]     Constitutional:       General: She is not in acute distress.     Appearance: She is not  ill-appearing or diaphoretic.   HENT:      Head: Normocephalic and atraumatic.      Mouth/Throat:      Mouth: Mucous membranes are dry.   Eyes:      Extraocular Movements: Extraocular movements intact.      Conjunctiva/sclera: Conjunctivae normal.      Pupils: Pupils are equal, round, and reactive to light.   Cardiovascular:      Rate and Rhythm: Normal rate. Rhythm irregular.      Pulses: Normal pulses.      Heart sounds: Normal heart sounds.   Pulmonary:      Effort: Pulmonary effort is normal. Tachypnea present. No respiratory distress.      Breath sounds: Decreased breath sounds present. No wheezing.   Chest:      Comments: Right-sided pigtail   Abdominal:      General: Bowel sounds are normal. There is no distension.      Palpations: Abdomen is soft.      Tenderness: There is no abdominal tenderness.   Musculoskeletal:      Right lower leg: Edema present.      Left lower leg: Edema present.   Skin:     General: Skin is warm.      Capillary Refill: Capillary refill takes less than 2 seconds.      Comments: Aged related skin changes to extremities    Neurological:      General: No focal deficit present.      Mental Status: She is alert and oriented to person, place, and time.      Motor: Weakness present.     Review of Systems:  14 point review of systems was completed and negative except for those specially mention in my HPI    I have reviewed all medications, laboratory results, and imaging pertinent for today's encounter.           Intake/Output Summary (Last 24 hours) at 5/5/2024 0822  Last data filed at 5/5/2024 0700  Gross per 24 hour   Intake 350 ml   Output 490 ml   Net -140 ml            Assessment/Plan:    I am currently managing this critically ill patient for the following problems:    Neuro/Psych/Pain Ctrl/Sedation:  Pain control post mechanical fall   - Tylenol for mild pain  - Toradol for moderate pain  - CAM ICU qshift, sleep-wake hygiene, delirium precautions      Respiratory/ENT:  Pneumothorax    Hemothorax - trace right per Chest CT  Acute hypoxic respiratory insufficiency without respiratory failure   - Continue O2 via NC, currently on 4L  - Maintain SPO2 >92%  - Daily CXR with pigtail catheter in place   - If CXR good 5/6 chest tube can be removed  - Continuous pulse ox monitoring   - Pulm hygiene: encourage IS     Cardiovascular:  HTN  Afib on coumadin  S/p pacemaker placement - currently in paced rhythm with underlying afib   Hx Diastolic HF   - Lasix x1 today   - Pacemaker interrogated with no issues  - Continuous cardiac monitoring per ICU protocol  - Maintain MAPS >65  - EKGs PRN for ACS symptoms      GI:  No active concerns   - Diet: regular  - BR with miralax PRN  - Zofran PRN for nausea      Renal/Volume Status (Intra & Extravascular):  MARIA DEL ROSARIO on CKD  - Baseline Cr 0.9  - Cr 1.3 -> 1.1  - Maintain urine output 0.5-1.0cc/kg/hr  - Monitor I/O's  - Replete electrolytes to maintain K >4.0 and Mg >2.0  - Daily BMP, Mg, Phos    Endocrine  DM2 - on metformin outpt  - SSI #1 ACHs  - Hypoglycemia protocol      Infectious Disease:  Leukocytosis, predominantly neutrophils - likely stress reaction   - Antibiotics: not indicated at this time as pt does not meet SIRS criteria and is not showing other signs of infection   - Daily CBC to trend WBCs   - Monitor SIRS criteria     Heme/Onc:  Subtherapeutic INR - 1.8   - Holding warfarin  - Monitor for s/sx of anemia such as bleeding and bruising   - Transfuse if Hgb <7.0   - Daily CBC     MSK:  Mechanical fall   Acute displaced rib fractures - 7th-9th ribs right-sided   - ACS consulted, appreciate recommendations   - Padded pressure points   - Ambulatory at baseline  - PT/OT when appropriate      Derm:  - ICU skin protocol     Ethics/Code Status:  Full code - discussed this in detail with patient and daughter Alissa at bedside. Alissa is POA and paperwork is documented in her chart and will be readily available with chart merge. Pt actual MRN [id number]. Alissa contact info-  [phone].     :  DVT Prophylaxis: SCDs, lovenox  GI Prophylaxis: none  Bowel Regimen: miralax PRN  Diet: regular  CVC: none  Tanisha: none  Morales: none  Restraints: none  Dispo: Plan to transfer to the floor    Critical Care Time:  60 minutes spent in preparing to see patient (I.e.labs,imaging, etc.), documentation, discussion plan of care with patient/family/caregiver, and/ or coordination of care with multidisciplinary team including the attending. Time does not include completion of procedure time.     Linda Sanchez PA-C  Pulmonology & Critical Care Medicine   [facility]

## 2024-05-05 NOTE — CARE PLAN
The patient's goals for the shift include  control pain and get sleep    The clinical goals for the shift include remain hemodynamically stable and control pain      Problem: Pain - Adult  Goal: Verbalizes/displays adequate comfort level or baseline comfort level  Outcome: Progressing     Problem: Safety - Adult  Goal: Free from fall injury  Outcome: Progressing     Problem: Discharge Planning  Goal: Discharge to home or other facility with appropriate resources  Outcome: Progressing     Problem: Chronic Conditions and Co-morbidities  Goal: Patient's chronic conditions and co-morbidity symptoms are monitored and maintained or improved  Outcome: Progressing     Problem: Fall/Injury  Goal: Not fall by end of shift  Outcome: Progressing  Goal: Be free from injury by end of the shift  Outcome: Progressing  Goal: Verbalize understanding of personal risk factors for fall in the hospital  Outcome: Progressing  Goal: Verbalize understanding of risk factor reduction measures to prevent injury from fall in the home  Outcome: Progressing  Goal: Pace activities to prevent fatigue by end of the shift  Outcome: Progressing     Problem: Skin  Goal: Decreased wound size/increased tissue granulation at next dressing change  Outcome: Progressing  Flowsheets (Taken 5/4/2024 2121)  Decreased wound size/increased tissue granulation at next dressing change:   Promote sleep for wound healing   Protective dressings over bony prominences   Utilize specialty bed per algorithm  Goal: Participates in plan/prevention/treatment measures  Outcome: Progressing  Flowsheets (Taken 5/4/2024 2121)  Participates in plan/prevention/treatment measures:   Discuss with provider PT/OT consult   Elevate heels   Increase activity/out of bed for meals  Goal: Prevent/manage excess moisture  Outcome: Progressing  Flowsheets (Taken 5/4/2024 2121)  Prevent/manage excess moisture:   Monitor for/manage infection if present   Cleanse incontinence/protect with barrier  cream   Moisturize dry skin  Goal: Prevent/minimize sheer/friction injuries  Outcome: Progressing  Flowsheets (Taken 5/4/2024 2121)  Prevent/minimize sheer/friction injuries:   Complete micro-shifts as needed if patient unable. Adjust patient position to relieve pressure points, not a full turn   Increase activity/out of bed for meals   Use pull sheet   Turn/reposition every 2 hours/use positioning/transfer devices   Utilize specialty bed per algorithm  Goal: Promote/optimize nutrition  Outcome: Progressing  Flowsheets (Taken 5/4/2024 2121)  Promote/optimize nutrition:   Consume > 50% meals/supplements   Offer water/supplements/favorite foods   Monitor/record intake including meals  Goal: Promote skin healing  Outcome: Progressing  Flowsheets (Taken 5/4/2024 2121)  Promote skin healing:   Protective dressings over bony prominences   Assess skin/pad under line(s)/device(s)   Turn/reposition every 2 hours/use positioning/transfer devices     Problem: Pain  Goal: Takes deep breaths with improved pain control throughout the shift  Outcome: Progressing  Goal: Turns in bed with improved pain control throughout the shift  Outcome: Progressing  Goal: Walks with improved pain control throughout the shift  Outcome: Progressing  Goal: Performs ADL's with improved pain control throughout shift  Outcome: Progressing  Goal: Participates in PT with improved pain control throughout the shift  Outcome: Progressing  Goal: Free from opioid side effects throughout the shift  Outcome: Progressing  Goal: Free from acute confusion related to pain meds throughout the shift  Outcome: Progressing

## 2024-05-05 NOTE — CONSULTS
Inpatient consult to Medicine  Consult performed by: Lina Frazier, JAIMIE-CNP  Consult ordered by: Wilfredo Valle PA-C          Reason For Consult  Medical management Atrial Fibrillation, diastolic heart failure, DM    History Of Present Illness  Audelia Virgen is a 89 y.o. female with a past medical history of diabetes mellitus, hypertension, atrial fibrillation s/p recent PPM on 4/19/24, on Coumadin who presented to [facility] on 5/4/24 after a fall. Patient was found to have multiple right sided rib fractures and a 20% pneumothorax. A pigtail chest tube was placed and she was transferred to [facility] and admitted under general surgery to ICU. Patient states that she believes that she tripped and fell at home. Denied hitting her head or losing consciousness. States that her pain had been under control however today she did have an episode of severe pain with associated nausea. She was treated with pain medication and antiemetic with some improvement. Denies shortness of breath, fevers, or chills. No abdominal discomfort. Denies lightheadedness or dizziness. Patient was monitored in ICU for a period of time and transferred to the floor. Medicine was consulted for management of her chronic medical conditions, A fib, heart failure, DM.       Past Medical History  She has no past medical history on file.  Diabetes Mellitus  Hypertension  Atrial fibrillation    Surgical History  She has no past surgical history on file.   Hysterectomy  Mastectomy  PPM 4/19/24    Social History  She reports that she has never smoked. She has never used smokeless tobacco. She reports current alcohol use. No history on file for drug use.    Family History  No family history on file.     Allergies  Patient has no known allergies.    Review of Systems  As per HPI. At least 10 systems reviewed and are otherwise stable.     Physical Exam  General: Elderly. NAD.  HEENT: PERRL. EOMI. Pink conjunctiva. Trachea midline.  Cardiovascular:  S1, S2. RRR. No edema.  Respiratory: Breath sounds clear, diminished bilaterally. No cyanosis. No hypoxia. On supplemental oxygen. Right sided chest tube to water seal without air leak.   GI: Abdomen soft, nontender. Bowel sounds present in all quadrants .   : No bladder distention.  MS: CAMACHO. No deformity.   Neuro: Alert, oriented. No numbness or paresthesia.   Skin: Warm, dry, and intact.         Last Recorded Vitals  BP (!) 119/48   Pulse 60   Temp 36.2 °C (97.2 °F) (Temporal)   Resp 26   Wt 59.3 kg (130 lb 11.7 oz)   SpO2 95%     Relevant Results  Lab Results   Component Value Date    GLUCOSE 102 (H) 05/05/2024    CALCIUM 8.8 05/05/2024     05/05/2024    K 4.0 05/05/2024    CO2 28 05/05/2024     05/05/2024    BUN 44 (H) 05/05/2024    CREATININE 1.40 05/05/2024     Lab Results   Component Value Date    WBC 8.4 05/05/2024    HGB 10.6 (L) 05/05/2024    HCT 32.4 (L) 05/05/2024    MCV 98 05/05/2024     (L) 05/05/2024     XR chest 1 view  Result Date: 5/5/2024  Signs of CHF, COPD, small bilateral pleural effusions and bilateral lower lobe atelectasis or infiltrates unchanged since the prior exam   Signed by: Genna Lara 5/5/2024 12:58 PM Dictation workstation:   [id number]    XR chest 1 view  Result Date: 5/4/2024  Interval right pigtail catheter placement. Persistent small right pneumothorax. Bibasilar airspace opacities, which may represent areas of atelectasis with pulmonary contusions not excluded in the setting of trauma. Mildly displaced fractures of the right 7th and 8th ribs.   MACRO: None.   Signed by: Evan Finkelstein 5/4/2024 4:52 AM Dictation workstation:   [id number]    CT chest wo IV contrast  Result Date: 5/4/2024  Acute displaced fractures of the lateral right 7th through 9th ribs. Right lateral lower chest wall subcutaneous emphysema that extends inferiorly beyond the extent of imaging.   There is a small to moderate-sized right pneumothorax the occupies probably between  10 and 20 % of the volume of the right pleural cavity. Trace right hemothorax. No pleural effusion or pneumothorax on the left.   There is mild asymmetric ground-glass opacity in the right lower lobe with mild subpleural consolidative opacity, which probably relates to atelectasis, possibly with a component of pulmonary contusion. Platelike and reticular opacity in the left lower lung probably relates to atelectasis or scarring. No focal consolidation.   There is a background of pulmonary interstitial prominence with mild smooth interlobular septal thickening in the could relate to chronic changes with or without acute pulmonary interstitial edema/CHF. Atherosclerosis, including coronary artery disease.   Marked cardiomegaly.   Circumferential mural thickening of the distal esophagus with submucosal edema compatible with esophagitis; underlying neoplasm is not excluded. Recommend endoscopic correlation or further evaluation nonemergent outpatient fluoroscopic barium swallow exam. A yellow alert was sent.   MACRO: Critical Finding:  See findings. Notification was initiated on 5/4/2024 at 3:33 am by  Rigoberto Varma.  (**-YCF-**) Instructions:   Signed by: Rigoberto Varma 5/4/2024 3:33 AM Dictation workstation:   [id number]    CT head wo IV contrast  Result Date: 5/4/2024  No evidence of acute intracranial abnormality.   No acute cervical spine fracture or malalignment.   Partially imaged right pneumothorax.     MACRO: None   Signed by: Rigoberto Varma 5/4/2024 3:24 AM Dictation workstation:   [id number]    CT cervical spine wo IV contrast  Result Date: 5/4/2024  No evidence of acute intracranial abnormality.   No acute cervical spine fracture or malalignment.   Partially imaged right pneumothorax.     MACRO: None   Signed by: Rigoberto Varma 5/4/2024 3:24 AM Dictation workstation:   [id number]    XR ribs right 2 views w chest pa or ap  Result Date: 5/4/2024  Cardiomegaly with mild interstitial prominence which could be chronic or  relate to component developing interstitial edema. Correlate clinically.   Acute fractures of the right 7th through 9th ribs as described above. There is a subtle linear density subjacent to the site of fractures. A tiny right-sided pneumothorax can not be excluded. Attention on continued short-term follow-up or further evaluation with CT is suggested.   MACRO: Polly Weinberg discussed the significance and urgency of this critical finding by telephone with  SHANDRA GUMAROFRANCISCO on 5/4/2024 at 2:43 am. (**-RCF-**) Findings:  See findings.   Signed by: Polly Weinberg 5/4/2024 2:44 AM Dictation workstation:   [id number]        Assessment/Plan     Atrial fibrillation  S/p recent pacemaker placement 4/19/24  Coumadin currently on hold due to fall, rib fx, pneumo/hemothorax s/p CT placement  Monitor on tele  Continue metoprolol  Will reach out to EP and make aware   Resume Coumadin when cleared by general surgery    Pneumothorax  S/p chest tube placement  CT to water seal without air leak  General surgery managing, CXR tomorrow, if no PTX plan to remove chest tube  Incentive spirometer    Acute hypoxic respiratory failure  Currently on 2L oxygen via NC  Secondary to above  Monitor pulse ox, wean as tolerated    Multiple rib fractures   Imaging as above  Pain management    Fall  Fall precautions  PT/OT/OOB  Patient and daughter would like patient to go home on discharge    Diabetes Mellitus  Monitor blood glucose  Sliding scale insulin  Hypoglycemia protocol  hgbA1C    Chronic Diastolic Heart Failure  Was given IV Lasix by ICU NP  Daily weights  Monitor I/O  Resume home metoprolol with parameters, will hold losartan due to MARIA DEL ROSARIO    MARIA DEL ROSARIO  Creatinine 1.4, baseline is between 0.9-1  Hold losartan  Renally dose meds, avoid nephrotoxic medications  BMP in AM    Plan   Admitted to general surgery  Pain management  Antiemetics  Monitor BGM, SSI  Hold losartan due to MARIA DEL ROSARIO  Monitor on telemetery  Supplemental oxygen, wean as tolerated  CTS per  surgery  PT/OT/OOB  Encourage Incentive Spirometer  CBC and BMP in AM    Thank you so much for this consult. Will continue to follow.           Lina Frazier, APRN-CNP

## 2024-05-05 NOTE — PROGRESS NOTES
"Audelia Virgen is a 89 year old female transferred from [facility] to [place]. She had a fall at home resulting in rib fx, pneumothorax and hemothorax.        05/05/24 0948   Current Planned Discharge Disposition   Current Planned Discharge Disposition Home     Patient lives alone in a single level condo. She has a walker that she uses as needed. She wears hearing aides, requested this writer speak slowly. She is independent with ADLs, daily tasks, drives short distances (10 miles or less). States everything she needs is within that distance. Her daughter visits once/week. She socializes with friends and belongs to a bridge club at her local senior center. PCP is Juan Jones MD.   ADOD 05/09/2024  Patient anticipated to dc home. PT/OT evals not yet posted. Patient open to HHC/outpatient PT/OT \"if I need it\". RNCC will follow for updates.     Melinda Wallace RN-BSN  "

## 2024-05-05 NOTE — PROGRESS NOTES
Physical Therapy    Physical Therapy Evaluation & Treatment    Patient Name: Audelia Virgen  MRN: [id number]  Today's Date: 5/5/2024   Time Calculation  Start Time: 0833  Stop Time: 0858  Time Calculation (min): 25 min    Assessment/Plan   PT Assessment  PT Assessment Results: Decreased strength, Decreased endurance, Impaired balance, Decreased mobility, Impaired judgement, Decreased safety awareness, Pain  Rehab Prognosis: Good  Evaluation/Treatment Tolerance: Patient tolerated treatment well  Medical Staff Made Aware: Yes  Strengths: Ability to acquire knowledge, Premorbid level of function  Barriers to Participation: Support of Caregivers  End of Session Communication: Bedside nurse  Assessment Comment: Pt demonstrates impaired functional mobility from baseline level; pt with pain, mild balance/BLE strength deficits, and decreased overall activity tolerance; pt lives alone with limited support; h/o recent fall and recent hospitalization; may benefit from 24 hr supervision upon d/c for safety.  End of Session Patient Position: Up in chair, Alarm off, not on at start of session (RN aware)   IP OR SWING BED PT PLAN  Inpatient or Swing Bed: Inpatient  PT Plan  Treatment/Interventions: Bed mobility, Transfer training, Gait training, Stair training, Neuromuscular re-education, Balance training, Strengthening, Endurance training, Therapeutic exercise, Therapeutic activity  PT Plan: Skilled PT  PT Frequency: 5 times per week  PT Discharge Recommendations: Moderate intensity level of continued care  Equipment Recommended upon Discharge: Wheeled walker  PT Recommended Transfer Status: Assist x1  PT - OK to Discharge: Yes    Subjective     General Visit Information:  General  Reason for Referral: impaired functional mobility (Pt is a 89 year-old F admitted from home with multiple rib fxs (R 7-9) after a mechanical fall; s/p pigtail placement for pneumothorax. Pt with recent admission for Afib, s/p pacemaker placement  4/22.)  Referred By: Wilfredo Valle PA-C  Past Medical History Relevant to Rehab: pacemaker  Missed Visit: No  Missed Visit Reason: Other (Comment)  Family/Caregiver Present: No  Prior to Session Communication: Bedside nurse  Patient Position Received: Bed, 3 rail up, Alarm on  Preferred Learning Style: verbal  General Comment: Pt cleared for therapy via RN, received in supine, NAD, agreeable to participate in therapy. (+) 2L O2 via NC, telemetry, chest tube  Home Living:  Home Living  Type of Home: Condo  Lives With: Alone  Home Adaptive Equipment: Walker rolling or standard  Home Layout: One level  Home Access: Stairs to enter with rails  Entrance Stairs-Rails: Both  Entrance Stairs-Number of Steps: 2  Bathroom Shower/Tub: Walk-in shower  Bathroom Toilet: Standard  Bathroom Equipment: Grab bars in shower, Built-in shower seat  Prior Level of Function:  Prior Function Per Pt/Caregiver Report  Level of [place]: Independent with ADLs and functional transfers, Independent with homemaking with ambulation  Receives Help From: Other (Comment), Family (daughter lives one hour away, visits weekly; limited support)  ADL Assistance: Independent  Homemaking Assistance: Independent  Ambulatory Assistance: Independent  Vocational: Retired  Hand Dominance: Right  Prior Function Comments: denies previous falls  Precautions:  Precautions  Hearing/Visual Limitations: glasses; B hearing aids  Medical Precautions: Fall precautions, Chest tube, Oxygen therapy device and L/min (2L O2 via NC)  Vital Signs:  Vital Signs  Heart Rate: 75  SpO2: 91 %  BP: 139/57  MAP (mmHg): 81    Objective   Pain:  Pain Assessment  Pain Assessment: 0-10  Pain Score: 0 - No pain  Cognition:  Cognition  Overall Cognitive Status: Within Functional Limits  Orientation Level: Oriented X4    General Assessments:  General Observation  General Observation: pleasant/cooperative throughout     Activity Tolerance  Endurance: Tolerates 10 - 20 min exercise with  multiple rests    Sensation  Light Touch: No apparent deficits  Sensation Comment: pt denies paresthesias    Strength  Strength Comments: BLE > 4/5  Strength  Strength Comments: BLE > 4/5    Coordination  Movements are Fluid and Coordinated: Yes    Postural Control  Postural Control: Within Functional Limits    Static Sitting Balance  Static Sitting-Balance Support: Feet supported, Bilateral upper extremity supported  Static Sitting-Level of Assistance: Close supervision    Static Standing Balance  Static Standing-Balance Support: Bilateral upper extremity supported  Static Standing-Level of Assistance: Minimum assistance  Functional Assessments:  ADL  ADL's Addressed: Yes  ADL Comments: assisted pt to bedside commode with use of RW and min assist x 1; min assist x 1 to steady in standing for pt to perform self jessie-area hygiene and don/doff underwear    Bed Mobility  Bed Mobility: Yes  Bed Mobility 1  Bed Mobility 1: Supine to sitting  Level of Assistance 1: Minimum assistance  Bed Mobility Comments 1: assist for trunk elevation and advancement of BLE off EOB    Transfers  Transfer: Yes  Transfer 1  Transfer From 1: Sit to  Transfer to 1: Stand  Technique 1: Sit to stand  Transfer Device 1: Walker  Transfer Level of Assistance 1: Minimum assistance  Trials/Comments 1: assist to steady, cueing for proper hand placement with fair carryover demonstrated  Transfers 2  Transfer From 2: Stand to  Transfer to 2: Sit  Technique 2: Stand to sit  Transfer Device 2: Walker  Transfer Level of Assistance 2: Minimum assistance  Trials/Comments 2: assist for safe eccentric lowering, cueing for sequencing  Transfers 3  Transfer From 3: Bed to  Transfer to 3: Commode-standard  Technique 3: Sit to stand, Stand to sit  Transfer Device 3: Walker  Transfer Level of Assistance 3: Minimum assistance  Trials/Comments 3: assist for balance and line management    Ambulation/Gait Training  Ambulation/Gait Training Performed:  Yes  Ambulation/Gait Training 1  Surface 1: Level tile  Device 1: Rolling walker  Assistance 1: Minimum assistance  Quality of Gait 1: Forward flexed posture, Decreased step length (reciprocating pattern)  Comments/Distance (ft) 1: 3-4 steps to bedside commode and 3-4 steps to bedside chair from commode    Stairs  Stairs: No     Extremity/Trunk Assessments:  RLE   RLE : Within Functional Limits  LLE   LLE : Within Functional Limits  Treatments:  Bed Mobility  Bed Mobility: Yes  Bed Mobility 1  Bed Mobility 1: Supine to sitting  Level of Assistance 1: Minimum assistance  Bed Mobility Comments 1: assist for trunk elevation and advancement of BLE off EOB    Ambulation/Gait Training  Ambulation/Gait Training Performed: Yes  Ambulation/Gait Training 1  Surface 1: Level tile  Device 1: Rolling walker  Assistance 1: Minimum assistance  Quality of Gait 1: Forward flexed posture, Decreased step length (reciprocating pattern)  Comments/Distance (ft) 1: 3-4 steps to bedside commode and 3-4 steps to bedside chair from commode  Transfers  Transfer: Yes  Transfer 1  Transfer From 1: Sit to  Transfer to 1: Stand  Technique 1: Sit to stand  Transfer Device 1: Walker  Transfer Level of Assistance 1: Minimum assistance  Trials/Comments 1: assist to steady, cueing for proper hand placement with fair carryover demonstrated  Transfers 2  Transfer From 2: Stand to  Transfer to 2: Sit  Technique 2: Stand to sit  Transfer Device 2: Walker  Transfer Level of Assistance 2: Minimum assistance  Trials/Comments 2: assist for safe eccentric lowering, cueing for sequencing  Transfers 3  Transfer From 3: Bed to  Transfer to 3: Commode-standard  Technique 3: Sit to stand, Stand to sit  Transfer Device 3: Walker  Transfer Level of Assistance 3: Minimum assistance  Trials/Comments 3: assist for balance and line management  Outcome Measures:  [facility] Basic Mobility  Turning from your back to your side while in a flat bed without using bedrails: A  little  Moving from lying on your back to sitting on the side of a flat bed without using bedrails: A little  Moving to and from bed to chair (including a wheelchair): A little  Standing up from a chair using your arms (e.g. wheelchair or bedside chair): A little  To walk in hospital room: A little  Climbing 3-5 steps with railing: Total  Basic Mobility - Total Score: 16    Encounter Problems       Encounter Problems (Active)       Mobility       STG - Patient will ambulate 150' with LRAD and modified independence. (Progressing)       Start:  05/05/24    Expected End:  05/19/24            STG - Patient will ascend and descend two stairs with use of B handrails and modified independence. (Not Progressing)       Start:  05/05/24    Expected End:  05/19/24               PT Transfers       STG - Patient to transfer to and from sit to supine with independence. (Progressing)       Start:  05/05/24    Expected End:  05/19/24            STG - Patient will transfer sit to and from stand with LRAD and modified independence. (Progressing)       Start:  05/05/24    Expected End:  05/19/24               Pain          Pain - Adult              Education Documentation  Mobility Training, taught by Karissa Schroeder, PT at 5/5/2024 10:12 AM.  Learner: Patient  Readiness: Acceptance  Method: Explanation, Demonstration  Response: Needs Reinforcement    Education Comments  No comments found.

## 2024-05-05 NOTE — PROGRESS NOTES
"Audelia Virgen is a 89 y.o. female on day 1 of admission presenting with Pneumothorax.    Subjective   Breathing comfortably on 2 L nasal cannula.  Denies much rib pain.  Xray improved       Objective     Physical Exam    Last Recorded Vitals  Blood pressure 139/57, pulse 75, temperature 36.5 °C (97.7 °F), temperature source Temporal, resp. rate 14, height 1.626 m (5' 4.02\"), weight 59.3 kg (130 lb 11.7 oz), SpO2 91%.  Intake/Output last 3 Shifts:  I/O last 3 completed shifts:  In: 350 (5.9 mL/kg) [P.O.:300; I.V.:50 (0.8 mL/kg)]  Out: 490 (8.3 mL/kg) [Urine:450 (0.2 mL/kg/hr); Chest Tube:40]  Weight: 59.3 kg     Relevant Results  Lab Results   Component Value Date    WBC 8.4 05/05/2024    HGB 10.6 (L) 05/05/2024    HCT 32.4 (L) 05/05/2024    MCV 98 05/05/2024     (L) 05/05/2024     Lab Results   Component Value Date    GLUCOSE 102 (H) 05/05/2024    CALCIUM 8.8 05/05/2024     05/05/2024    K 4.0 05/05/2024    CO2 28 05/05/2024     05/05/2024    BUN 44 (H) 05/05/2024    CREATININE 1.40 05/05/2024                    Assessment/Plan   Principal Problem:    Pneumothorax    89 yo woman s/p fall with right sided rib fractures and PTX s/p pigtail chest tube placement  - Chest tube to water seal  - Repeat xray in morning, if no PTX, then chest tube can be removed  - PT/OT  - Patient and daughter would prefer that she discharge to home         I spent 30 minutes in the professional and overall care of this patient.      Matt Louie MD      "

## 2024-05-05 NOTE — PROGRESS NOTES
05/05/24 0934   Physical Activity   On average, how many days per week do you engage in moderate to strenuous exercise (like a brisk walk)? 4 days   On average, how many minutes do you engage in exercise at this level? 20 min   Financial Resource Strain   How hard is it for you to pay for the very basics like food, housing, medical care, and heating? Not hard   Housing Stability   In the last 12 months, was there a time when you were not able to pay the mortgage or rent on time? N   In the last 12 months, how many places have you lived? 1   In the last 12 months, was there a time when you did not have a steady place to sleep or slept in a shelter (including now)? N   Transportation Needs   In the past 12 months, has lack of transportation kept you from medical appointments or from getting medications? no   In the past 12 months, has lack of transportation kept you from meetings, work, or from getting things needed for daily living? No   Food Insecurity   Within the past 12 months, you worried that your food would run out before you got the money to buy more. Never true   Within the past 12 months, the food you bought just didn't last and you didn't have money to get more. Never true   Stress   Do you feel stress - tense, restless, nervous, or anxious, or unable to sleep at night because your mind is troubled all the time - these days? Not at all   Social Connections   In a typical week, how many times do you talk on the phone with family, friends, or neighbors? More than 3   How often do you get together with friends or relatives? Three times   How often do you attend Mormonism or Congregation services? Never   Do you belong to any clubs or organizations such as Mormonism groups, unions, fraternal or athletic groups, or school groups? Yes   How often do you attend meetings of the clubs or organizations you belong to? More than 4   Are you , , , , never , or living with a partner?     Intimate Partner Violence   Within the last year, have you been afraid of your partner or ex-partner? No   Within the last year, have you been humiliated or emotionally abused in other ways by your partner or ex-partner? No   Within the last year, have you been kicked, hit, slapped, or otherwise physically hurt by your partner or ex-partner? No   Within the last year, have you been raped or forced to have any kind of sexual activity by your partner or ex-partner? No   Alcohol Use   Q1: How often do you have a drink containing alcohol? Monthly or l   Q2: How many drinks containing alcohol do you have on a typical day when you are drinking? 1 or 2   Q3: How often do you have six or more drinks on one occasion? Never   Utilities   In the past 12 months has the electric, gas, oil, or water company threatened to shut off services in your home? No   Health Literacy   How often do you need to have someone help you when you read instructions, pamphlets, or other written material from your doctor or pharmacy? Sometimes

## 2024-05-06 ENCOUNTER — APPOINTMENT (OUTPATIENT)
Dept: RADIOLOGY | Facility: HOSPITAL | Age: 89
End: 2024-05-06
Payer: MEDICARE

## 2024-05-06 LAB
ALBUMIN SERPL-MCNC: 3.3 G/DL (ref 3.5–5)
ANION GAP SERPL CALC-SCNC: 14 MMOL/L
APPEARANCE UR: ABNORMAL
BASOPHILS # BLD MANUAL: 0 X10*3/UL (ref 0–0.1)
BASOPHILS NFR BLD MANUAL: 0 %
BILIRUB UR STRIP.AUTO-MCNC: ABNORMAL MG/DL
BUN SERPL-MCNC: 65 MG/DL (ref 8–25)
CALCIUM SERPL-MCNC: 8.7 MG/DL (ref 8.5–10.4)
CHLORIDE SERPL-SCNC: 100 MMOL/L (ref 97–107)
CK SERPL-CCNC: 191 U/L (ref 24–195)
CO2 SERPL-SCNC: 25 MMOL/L (ref 24–31)
COLOR UR: YELLOW
CREAT SERPL-MCNC: 2.3 MG/DL (ref 0.4–1.6)
CREAT UR-MCNC: 203.2 MG/DL
EGFRCR SERPLBLD CKD-EPI 2021: 19 ML/MIN/1.73M*2
EOSINOPHIL # BLD MANUAL: 0 X10*3/UL (ref 0–0.4)
EOSINOPHIL NFR BLD MANUAL: 0 %
ERYTHROCYTE [DISTWIDTH] IN BLOOD BY AUTOMATED COUNT: 14.6 % (ref 11.5–14.5)
GLUCOSE BLD MANUAL STRIP-MCNC: 106 MG/DL (ref 74–99)
GLUCOSE BLD MANUAL STRIP-MCNC: 68 MG/DL (ref 74–99)
GLUCOSE BLD MANUAL STRIP-MCNC: 88 MG/DL (ref 74–99)
GLUCOSE BLD MANUAL STRIP-MCNC: 89 MG/DL (ref 74–99)
GLUCOSE BLD MANUAL STRIP-MCNC: 95 MG/DL (ref 74–99)
GLUCOSE SERPL-MCNC: 107 MG/DL (ref 65–99)
GLUCOSE UR STRIP.AUTO-MCNC: NORMAL MG/DL
HCT VFR BLD AUTO: 34.6 % (ref 36–46)
HGB BLD-MCNC: 11.3 G/DL (ref 12–16)
HYALINE CASTS #/AREA URNS AUTO: ABNORMAL /LPF
IMM GRANULOCYTES # BLD AUTO: 0.16 X10*3/UL (ref 0–0.5)
IMM GRANULOCYTES NFR BLD AUTO: 1.1 % (ref 0–0.9)
INR PPP: 1.4 (ref 0.9–1.2)
KETONES UR STRIP.AUTO-MCNC: NEGATIVE MG/DL
LEUKOCYTE ESTERASE UR QL STRIP.AUTO: NEGATIVE
LYMPHOCYTES # BLD MANUAL: 0.3 X10*3/UL (ref 0.8–3)
LYMPHOCYTES NFR BLD MANUAL: 2 %
MCH RBC QN AUTO: 31.8 PG (ref 26–34)
MCHC RBC AUTO-ENTMCNC: 32.7 G/DL (ref 32–36)
MCV RBC AUTO: 98 FL (ref 80–100)
METAMYELOCYTES # BLD MANUAL: 0.74 X10*3/UL
METAMYELOCYTES NFR BLD MANUAL: 5 %
MONOCYTES # BLD MANUAL: 0.74 X10*3/UL (ref 0.05–0.8)
MONOCYTES NFR BLD MANUAL: 5 %
MUCOUS THREADS #/AREA URNS AUTO: ABNORMAL /LPF
NEUTROPHILS # BLD MANUAL: 13.03 X10*3/UL (ref 1.6–5.5)
NEUTS BAND # BLD MANUAL: 4.59 X10*3/UL (ref 0–0.5)
NEUTS BAND NFR BLD MANUAL: 31 %
NEUTS SEG # BLD MANUAL: 8.44 X10*3/UL (ref 1.6–5)
NEUTS SEG NFR BLD MANUAL: 57 %
NITRITE UR QL STRIP.AUTO: NEGATIVE
NRBC BLD-RTO: 0 /100 WBCS (ref 0–0)
PH UR STRIP.AUTO: 5 [PH]
PHOSPHATE SERPL-MCNC: 5.2 MG/DL (ref 2.5–4.5)
PLATELET # BLD AUTO: 153 X10*3/UL (ref 150–450)
POTASSIUM SERPL-SCNC: 4.6 MMOL/L (ref 3.4–5.1)
PROT UR STRIP.AUTO-MCNC: ABNORMAL MG/DL
PROTHROMBIN TIME: 14.6 SECONDS (ref 9.3–12.7)
RBC # BLD AUTO: 3.55 X10*6/UL (ref 4–5.2)
RBC # UR STRIP.AUTO: NEGATIVE /UL
RBC #/AREA URNS AUTO: ABNORMAL /HPF
RBC MORPH BLD: ABNORMAL
SODIUM SERPL-SCNC: 139 MMOL/L (ref 133–145)
SODIUM UR-SCNC: 20 MMOL/L
SODIUM/CREAT UR-RTO: 10 MMOL/G CREAT
SP GR UR STRIP.AUTO: 1.02
SQUAMOUS #/AREA URNS AUTO: ABNORMAL /HPF
TOTAL CELLS COUNTED BLD: 100
UROBILINOGEN UR STRIP.AUTO-MCNC: ABNORMAL MG/DL
WBC # BLD AUTO: 14.8 X10*3/UL (ref 4.4–11.3)
WBC #/AREA URNS AUTO: ABNORMAL /HPF

## 2024-05-06 PROCEDURE — 85610 PROTHROMBIN TIME: CPT

## 2024-05-06 PROCEDURE — 80069 RENAL FUNCTION PANEL: CPT | Performed by: HOSPITALIST

## 2024-05-06 PROCEDURE — 71250 CT THORAX DX C-: CPT

## 2024-05-06 PROCEDURE — 36415 COLL VENOUS BLD VENIPUNCTURE: CPT | Performed by: HOSPITALIST

## 2024-05-06 PROCEDURE — 85007 BL SMEAR W/DIFF WBC COUNT: CPT

## 2024-05-06 PROCEDURE — 82570 ASSAY OF URINE CREATININE: CPT | Performed by: INTERNAL MEDICINE

## 2024-05-06 PROCEDURE — 74176 CT ABD & PELVIS W/O CONTRAST: CPT | Performed by: RADIOLOGY

## 2024-05-06 PROCEDURE — 87086 URINE CULTURE/COLONY COUNT: CPT | Mod: WESLAB

## 2024-05-06 PROCEDURE — 82550 ASSAY OF CK (CPK): CPT | Performed by: INTERNAL MEDICINE

## 2024-05-06 PROCEDURE — 97530 THERAPEUTIC ACTIVITIES: CPT | Mod: GP

## 2024-05-06 PROCEDURE — 81001 URINALYSIS AUTO W/SCOPE: CPT

## 2024-05-06 PROCEDURE — 71046 X-RAY EXAM CHEST 2 VIEWS: CPT | Performed by: RADIOLOGY

## 2024-05-06 PROCEDURE — 2500000004 HC RX 250 GENERAL PHARMACY W/ HCPCS (ALT 636 FOR OP/ED): Performed by: INTERNAL MEDICINE

## 2024-05-06 PROCEDURE — 2060000001 HC INTERMEDIATE ICU ROOM DAILY

## 2024-05-06 PROCEDURE — 71250 CT THORAX DX C-: CPT | Performed by: RADIOLOGY

## 2024-05-06 PROCEDURE — 2500000004 HC RX 250 GENERAL PHARMACY W/ HCPCS (ALT 636 FOR OP/ED): Performed by: HOSPITALIST

## 2024-05-06 PROCEDURE — 2500000001 HC RX 250 WO HCPCS SELF ADMINISTERED DRUGS (ALT 637 FOR MEDICARE OP)

## 2024-05-06 PROCEDURE — 99232 SBSQ HOSP IP/OBS MODERATE 35: CPT | Performed by: SURGERY

## 2024-05-06 PROCEDURE — 2500000004 HC RX 250 GENERAL PHARMACY W/ HCPCS (ALT 636 FOR OP/ED)

## 2024-05-06 PROCEDURE — 85027 COMPLETE CBC AUTOMATED: CPT

## 2024-05-06 PROCEDURE — 87040 BLOOD CULTURE FOR BACTERIA: CPT | Mod: WESLAB

## 2024-05-06 PROCEDURE — 2500000006 HC RX 250 W HCPCS SELF ADMINISTERED DRUGS (ALT 637 FOR ALL PAYERS)

## 2024-05-06 PROCEDURE — 82947 ASSAY GLUCOSE BLOOD QUANT: CPT

## 2024-05-06 PROCEDURE — 97530 THERAPEUTIC ACTIVITIES: CPT | Mod: GO

## 2024-05-06 PROCEDURE — 97110 THERAPEUTIC EXERCISES: CPT | Mod: GP

## 2024-05-06 PROCEDURE — 2500000005 HC RX 250 GENERAL PHARMACY W/O HCPCS

## 2024-05-06 PROCEDURE — 97167 OT EVAL HIGH COMPLEX 60 MIN: CPT | Mod: GO

## 2024-05-06 PROCEDURE — 36415 COLL VENOUS BLD VENIPUNCTURE: CPT

## 2024-05-06 RX ORDER — SODIUM CHLORIDE 9 MG/ML
75 INJECTION, SOLUTION INTRAVENOUS CONTINUOUS
Status: DISCONTINUED | OUTPATIENT
Start: 2024-05-06 | End: 2024-05-08

## 2024-05-06 RX ORDER — FAMOTIDINE 20 MG/1
20 TABLET, FILM COATED ORAL DAILY
Status: DISCONTINUED | OUTPATIENT
Start: 2024-05-07 | End: 2024-05-10 | Stop reason: HOSPADM

## 2024-05-06 RX ORDER — TRAMADOL HYDROCHLORIDE 50 MG/1
50 TABLET ORAL EVERY 8 HOURS PRN
Status: DISCONTINUED | OUTPATIENT
Start: 2024-05-06 | End: 2024-05-10 | Stop reason: HOSPADM

## 2024-05-06 RX ORDER — POLYETHYLENE GLYCOL 3350 17 G/17G
17 POWDER, FOR SOLUTION ORAL DAILY
Status: DISCONTINUED | OUTPATIENT
Start: 2024-05-06 | End: 2024-05-10 | Stop reason: HOSPADM

## 2024-05-06 RX ADMIN — ALLOPURINOL 100 MG: 100 TABLET ORAL at 20:03

## 2024-05-06 RX ADMIN — SODIUM CHLORIDE 100 ML/HR: 900 INJECTION, SOLUTION INTRAVENOUS at 09:26

## 2024-05-06 RX ADMIN — HYDROMORPHONE HYDROCHLORIDE 0.2 MG: 1 INJECTION, SOLUTION INTRAMUSCULAR; INTRAVENOUS; SUBCUTANEOUS at 03:21

## 2024-05-06 RX ADMIN — METOPROLOL TARTRATE 12.5 MG: 25 TABLET, FILM COATED ORAL at 09:20

## 2024-05-06 RX ADMIN — WARFARIN SODIUM 5 MG: 5 TABLET ORAL at 18:10

## 2024-05-06 RX ADMIN — SODIUM CHLORIDE 100 ML/HR: 900 INJECTION, SOLUTION INTRAVENOUS at 20:03

## 2024-05-06 RX ADMIN — ENOXAPARIN SODIUM 30 MG: 30 INJECTION SUBCUTANEOUS at 09:20

## 2024-05-06 RX ADMIN — SODIUM CHLORIDE 100 ML/HR: 900 INJECTION, SOLUTION INTRAVENOUS at 09:31

## 2024-05-06 RX ADMIN — ACETAMINOPHEN 650 MG: 325 TABLET ORAL at 20:02

## 2024-05-06 RX ADMIN — METOPROLOL TARTRATE 12.5 MG: 25 TABLET, FILM COATED ORAL at 20:03

## 2024-05-06 RX ADMIN — LIDOCAINE 1 PATCH: 4 PATCH TOPICAL at 09:20

## 2024-05-06 RX ADMIN — TRAMADOL HYDROCHLORIDE 50 MG: 50 TABLET ORAL at 21:08

## 2024-05-06 ASSESSMENT — COGNITIVE AND FUNCTIONAL STATUS - GENERAL
HELP NEEDED FOR BATHING: A LOT
MOVING TO AND FROM BED TO CHAIR: A LOT
MOBILITY SCORE: 8
DAILY ACTIVITIY SCORE: 11
TURNING FROM BACK TO SIDE WHILE IN FLAT BAD: A LOT
MOVING FROM LYING ON BACK TO SITTING ON SIDE OF FLAT BED WITH BEDRAILS: A LOT
PERSONAL GROOMING: A LITTLE
DRESSING REGULAR UPPER BODY CLOTHING: A LOT
TOILETING: A LOT
DRESSING REGULAR UPPER BODY CLOTHING: A LOT
CLIMB 3 TO 5 STEPS WITH RAILING: TOTAL
PERSONAL GROOMING: A LITTLE
CLIMB 3 TO 5 STEPS WITH RAILING: TOTAL
WALKING IN HOSPITAL ROOM: A LOT
STANDING UP FROM CHAIR USING ARMS: A LOT
DRESSING REGULAR LOWER BODY CLOTHING: TOTAL
DAILY ACTIVITIY SCORE: 15
MOBILITY SCORE: 13
STANDING UP FROM CHAIR USING ARMS: TOTAL
DRESSING REGULAR LOWER BODY CLOTHING: A LOT
TURNING FROM BACK TO SIDE WHILE IN FLAT BAD: A LITTLE
MOVING TO AND FROM BED TO CHAIR: TOTAL
MOVING FROM LYING ON BACK TO SITTING ON SIDE OF FLAT BED WITH BEDRAILS: A LITTLE
EATING MEALS: A LITTLE
HELP NEEDED FOR BATHING: TOTAL
TOILETING: TOTAL
WALKING IN HOSPITAL ROOM: TOTAL

## 2024-05-06 ASSESSMENT — PAIN SCALES - GENERAL
PAINLEVEL_OUTOF10: 5 - MODERATE PAIN
PAINLEVEL_OUTOF10: 6
PAINLEVEL_OUTOF10: 5 - MODERATE PAIN
PAINLEVEL_OUTOF10: 0 - NO PAIN
PAINLEVEL_OUTOF10: 5 - MODERATE PAIN
PAINLEVEL_OUTOF10: 8
PAINLEVEL_OUTOF10: 0 - NO PAIN
PAINLEVEL_OUTOF10: 0 - NO PAIN

## 2024-05-06 ASSESSMENT — PAIN SCALES - PAIN ASSESSMENT IN ADVANCED DEMENTIA (PAINAD)
FACIALEXPRESSION: SAD, FRIGHTENED, FROWN
BODYLANGUAGE: TENSE, DISTRESSED PACING, FIDGETING
TOTALSCORE: MEDICATION (SEE MAR);ELEVATED;REPOSITIONED
TOTALSCORE: 5
NEGVOCALIZATION: REPEATED TROUBLED CALLING OUT, LOUD MOANING/GROANING, CRYING
CONSOLABILITY: DISTRACTED OR REASSURED BY VOICE/TOUCH
BREATHING: NORMAL

## 2024-05-06 ASSESSMENT — ENCOUNTER SYMPTOMS
PALPITATIONS: 0
MYALGIAS: 1
JOINT SWELLING: 0
SHORTNESS OF BREATH: 0
WOUND: 0
FATIGUE: 1
LIGHT-HEADEDNESS: 0
BLOOD IN STOOL: 0
DIZZINESS: 0
CONFUSION: 0
DYSURIA: 0
ABDOMINAL PAIN: 1
COUGH: 0
SPEECH DIFFICULTY: 0
TREMORS: 0
RECTAL PAIN: 0
FEVER: 0
DIARRHEA: 0
NAUSEA: 0
APNEA: 0
FREQUENCY: 0
VOMITING: 0
CONSTIPATION: 0
POLYDIPSIA: 0
ABDOMINAL DISTENTION: 1
COLOR CHANGE: 0
BRUISES/BLEEDS EASILY: 0
SORE THROAT: 0
SINUS PRESSURE: 0
CHILLS: 0
HEADACHES: 0
SLEEP DISTURBANCE: 0
WEAKNESS: 0
PHOTOPHOBIA: 0
NUMBNESS: 0
SEIZURES: 0
HALLUCINATIONS: 0
ARTHRALGIAS: 0
NECK PAIN: 0
ADENOPATHY: 0
POLYPHAGIA: 0
HEMATURIA: 0
WHEEZING: 0
BACK PAIN: 0

## 2024-05-06 ASSESSMENT — PAIN - FUNCTIONAL ASSESSMENT
PAIN_FUNCTIONAL_ASSESSMENT: FLACC (FACE, LEGS, ACTIVITY, CRY, CONSOLABILITY)
PAIN_FUNCTIONAL_ASSESSMENT: WONG-BAKER FACES
PAIN_FUNCTIONAL_ASSESSMENT: 0-10
PAIN_FUNCTIONAL_ASSESSMENT: PAINAD (PAIN ASSESSMENT IN ADVANCED DEMENTIA SCALE)
PAIN_FUNCTIONAL_ASSESSMENT: 0-10
PAIN_FUNCTIONAL_ASSESSMENT: 0-10

## 2024-05-06 ASSESSMENT — ACTIVITIES OF DAILY LIVING (ADL): BATHING_ASSISTANCE: MAXIMAL

## 2024-05-06 ASSESSMENT — PAIN DESCRIPTION - ORIENTATION
ORIENTATION: RIGHT;LEFT
ORIENTATION: RIGHT;LEFT

## 2024-05-06 ASSESSMENT — PAIN SCALES - WONG BAKER: WONGBAKER_NUMERICALRESPONSE: NO HURT

## 2024-05-06 ASSESSMENT — PAIN DESCRIPTION - LOCATION
LOCATION: LEG
LOCATION: LEG

## 2024-05-06 NOTE — PROGRESS NOTES
Patient not medically clear. Patient with chest tube. TCC met with patient's daughter, Olga, regarding discharge planning. Olga declining SNF. At this time there is not a safe discharge plan in place. Will follow.      05/06/24 1530   Discharge Planning   Home or Post Acute Services None   Patient expects to be discharged to: Home   Does the patient need discharge transport arranged? No

## 2024-05-06 NOTE — PROGRESS NOTES
Occupational Therapy    Evaluation/Treatment    Patient Name: Audelia Virgen  MRN: [id number]  : 3/24/1930  Today's Date: 24  Time Calculation  Start Time: 845  Stop Time: 910  Time Calculation (min): 25 min       Assessment:  OT Assessment: Referral received, chart reviewed, and evaluation complete. Presents with decreased strength, aerobic capacity, balance, and  coordination  Prognosis: Good  Barriers to Discharge: None  Evaluation/Treatment Tolerance: Patient limited by fatigue, Patient limited by pain  Medical Staff Made Aware: Yes  End of Session Communication: Bedside nurse  End of Session Patient Position: Up in chair (working with PT)    Plan:  Treatment Interventions: ADL retraining, Functional transfer training, UE strengthening/ROM, Endurance training, Patient/family training, Neuromuscular reeducation, Compensatory technique education  OT Frequency: 5 times per week  OT Discharge Recommendations: Moderate intensity level of continued care  Equipment Recommended upon Discharge: Wheeled walker  OT Recommended Transfer Status: Assist of 2, Maximum assist  OT - OK to Discharge: Yes  Treatment Interventions: ADL retraining, Functional transfer training, UE strengthening/ROM, Endurance training, Patient/family training, Neuromuscular reeducation, Compensatory technique education    Subjective   Current Problem:  1. Pneumothorax          General:   OT Received On: 24  General  Reason for Referral: decreased functional status  Referred By: DARBY Sigala  Past Medical History Relevant to Rehab: DM, HTN, Hf, left mastectomy, pacer, Afib  Family/Caregiver Present: Yes  Caregiver Feedback: Daughter is present and provided information on her mother's recent pacemaker placement.  Pacemaker is on the right side and she currently still has precautions.  Co-Treatment: PT  Co-Treatment Reason: Medically complex patient in the ICU needs the skill of two therapists  Prior to Session Communication:  Bedside nurse  Patient Position Received: Bed, 3 rail up  General Comment: 89 year old WF admitted from home s/p fall and c/o pain with inspiration.  Radiology was significant for multiple rib fx's and hemothorax    Precautions:  Hearing/Visual Limitations: Hearing is impaired ([place]), wears glasses  Medical Precautions: Fall precautions  Post-Surgical Precautions:  (pacemaker located on the right side of chest with precautions)     Pain:  Pain Assessment  Pain Assessment: 0-10  Pain Score: 5 - Moderate pain  Pain Type: Acute pain  Pain Location: Abdomen    Objective   Cognition:  Overall Cognitive Status: Within Functional Limits  Orientation Level: Oriented X4      Home Living:  Type of Home: Condo  Lives With: Alone  Home Layout: One level  Home Access: Stairs to enter with rails  Entrance Stairs-Rails: Both  Entrance Stairs-Number of Steps: 2  Bathroom Shower/Tub: Walk-in shower  Bathroom Toilet: Standard  Bathroom Equipment: Grab bars in shower    Prior Function:  Level of [place]: Independent with ADLs and functional transfers  Receives Help From: Family  Homemaking Assistance: Needs assistance (daughter does the shopping)  Vocational: Retired  Hand Dominance: Right    IADL History:  Current License: Yes  Mode of Transportation: Car    ADL:  Eating Assistance: Minimal  Grooming Assistance: Moderate  Bathing Assistance: Maximal  UE Dressing Assistance: Moderate  LE Dressing Assistance: Total  Toileting Assistance with Device: Total    Activity Tolerance:  Endurance: Decreased tolerance for upright activites       Bed Mobility/Transfers: Bed Mobility  Bed Mobility: Yes  Bed Mobility 1  Bed Mobility 1: Supine to sitting  Level of Assistance 1: Maximum assistance  Bed Mobility Comments 1: max assist x2    Transfers  Transfer: Yes  Transfer 1  Transfer From 1: Bed to  Transfer to 1: Chair with arms  Transfer Level of Assistance 1: Dependent    Sitting Balance:  Static Sitting Balance  Static Sitting-Balance  Support: Feet supported  Static Sitting-Level of Assistance: Maximum assistance  Static Sitting-Comment/Number of Minutes: leans laterally to the right  Standing Balance:  Static Standing Balance  Static Standing-Balance Support: No upper extremity supported  Static Standing-Level of Assistance: Dependent     Sensation:  Light Touch: No apparent deficits    Strength:  Strength Comments: BUE ~36/5    Coordination:  Movements are Fluid and Coordinated: No     Hand Function:  Hand Function  Gross Grasp: Functional  Coordination: Impaired    Outcome Measures: [facility] Daily Activity  Putting on and taking off regular lower body clothing: Total  Bathing (including washing, rinsing, drying): Total  Putting on and taking off regular upper body clothing: A lot  Toileting, which includes using toilet, bedpan or urinal: Total  Taking care of personal grooming such as brushing teeth: A little  Eating Meals: A little  Daily Activity - Total Score: 11      Goals:         Problem: Functional Balance  Goal: STG - Maintains static sitting balance with upper extremity support and close supervision during functional activity  Description: I  Outcome: Progressing     Problem: Bathing  Goal: STG - Patient will bathe upper body with close supervision  Outcome: Progressing     Problem: Dressing Upper Extremities  Goal: LTG - Patient will complete upper body dressing with close supervision.  Outcome: Progressing     Problem: Eating  Goal: LTG - Patient will feed self independently  Outcome: Progressing     Problem: Grooming  Goal: STG - Patient will perform  grooming independent with set up  Outcome: Progressing     Problem: Toileting  Goal: STG - Patient will complete toileting tasks with close supervision and 2ww  Outcome: Progressing     Problem: OT Transfers  Goal: LTG - Patient will transfer from one surface to another with close supervision and 2ww  Outcome: Progressing

## 2024-05-06 NOTE — CARE PLAN
Problem: Skin  Goal: Decreased wound size/increased tissue granulation at next dressing change  Flowsheets (Taken 5/6/2024 0754)  Decreased wound size/increased tissue granulation at next dressing change:   Promote sleep for wound healing   Utilize specialty bed per algorithm   Protective dressings over bony prominences  Goal: Participates in plan/prevention/treatment measures  Flowsheets (Taken 5/6/2024 0754)  Participates in plan/prevention/treatment measures:   Discuss with provider PT/OT consult   Elevate heels   Increase activity/out of bed for meals  Goal: Prevent/manage excess moisture  Flowsheets (Taken 5/6/2024 0754)  Prevent/manage excess moisture:   Cleanse incontinence/protect with barrier cream   Moisturize dry skin   Follow provider orders for dressing changes   Monitor for/manage infection if present  Goal: Prevent/minimize sheer/friction injuries  Flowsheets (Taken 5/6/2024 0754)  Prevent/minimize sheer/friction injuries:   Complete micro-shifts as needed if patient unable. Adjust patient position to relieve pressure points, not a full turn   Increase activity/out of bed for meals   Use pull sheet   HOB 30 degrees or less   Turn/reposition every 2 hours/use positioning/transfer devices   Utilize specialty bed per algorithm  Goal: Promote/optimize nutrition  Flowsheets (Taken 5/6/2024 0754)  Promote/optimize nutrition:   Monitor/record intake including meals   Offer water/supplements/favorite foods  Goal: Promote skin healing  Flowsheets (Taken 5/6/2024 0754)  Promote skin healing:   Assess skin/pad under line(s)/device(s)   Protective dressings over bony prominences   Turn/reposition every 2 hours/use positioning/transfer devices   Ensure correct size (line/device) and apply per  instructions   Rotate device position/do not position patient on device   The patient's goals for the shift include      The clinical goals for the shift include Pt remain hemodynamically stable

## 2024-05-06 NOTE — PROGRESS NOTES
Audelia Virgen is a 89 y.o. female on day 2 of admission presenting with Pneumothorax.      Subjective   Patient seen and examined, female relative at bedside.  Patient denies acute events overnight.  Complains of distended lower abdomen, pain with palpation.  Denies chest pain or shortness of breath.  Does not appear to be in acute distress.       Objective     Last Recorded Vitals  /65   Pulse 75   Temp 37.2 °C (99 °F) (Temporal)   Resp 18   Wt 59.3 kg (130 lb 11.7 oz)   SpO2 94%   Intake/Output last 3 Shifts:    Intake/Output Summary (Last 24 hours) at 5/6/2024 1017  Last data filed at 5/6/2024 0425  Gross per 24 hour   Intake 50 ml   Output 50 ml   Net 0 ml       Admission Weight  Weight: 58.7 kg (129 lb 6.6 oz) (05/04/24 0751)    Daily Weight  05/05/24 : 59.3 kg (130 lb 11.7 oz)    Image Results  XR chest 1 view  Narrative: Interpreted By:  Santos Faria,   STUDY:  XR CHEST 1 VIEW  5/5/2024 1:41 pm      INDICATION:  Signs/Symptoms:Chest pain      COMPARISON:  05/05/2024 at 5:55 a.m.      ACCESSION NUMBER(S):  [id number]      ORDERING CLINICIAN:  FLAQUITA LEE      TECHNIQUE:  A single AP portable radiograph of the chest was obtained.      FINDINGS:  Multiple cardiac monitoring leads are seen over the chest.  A single  lead pacer is seen over the right chest. The right-sided chest tube  is unchanged in position. Bilateral basilar airspace consolidations  are seen and may represent small pleural effusions, atelectasis  and/or pneumonia. No pneumothorax is identified. The cardiac  silhouette is enlarged, though not significantly changed.      Impression: No significant interval change in the appearance of the chest, with  findings as above.      MACRO:  None.      Signed by: Santos Faria 5/5/2024 1:43 PM  Dictation workstation:   [id number]  XR chest 1 view  Narrative: Interpreted By:  Genna Lara,   STUDY:  XR CHEST 1 VIEW 5/5/2024 6:14 am      INDICATION:  Signs/Symptoms:eval pneumothorax       COMPARISON:  05/04/2024      ACCESSION NUMBER(S):  [id number]      ORDERING CLINICIAN:  SHANNON HERNÁNDEZ      TECHNIQUE:  AP view      FINDINGS:  Pacemaker overlies the right chest wall with a single transvenous  lead overlying the right ventricle. Pigtail catheter seen in the  right upper chest unchanged since the prior exam. Lungs are  hyperinflated typical of COPD there are suspected small bilateral  pleural effusions. Slight pulmonary venous congestion noted with an  enlarged cardiac silhouette. Bilateral lower lobe airspace  opacification may be due to atelectasis or infiltrates. Surgical  clips are seen in the left axilla.      Impression: Signs of CHF, COPD, small bilateral pleural effusions and bilateral  lower lobe atelectasis or infiltrates unchanged since the prior exam      Signed by: Genna Lara 5/5/2024 12:58 PM  Dictation workstation:   [id number]      Physical Exam  Vitals and nursing note reviewed.   Constitutional:       Appearance: Normal appearance.      Comments: Ill-appearing   HENT:      Head: Normocephalic.      Mouth/Throat:      Mouth: Mucous membranes are moist.   Eyes:      Extraocular Movements: Extraocular movements intact.      Pupils: Pupils are equal, round, and reactive to light.   Cardiovascular:      Rate and Rhythm: Normal rate.      Pulses: Normal pulses.      Heart sounds: Normal heart sounds.   Pulmonary:      Breath sounds: Normal breath sounds.      Comments: 2 L of oxygen via nasal cannula  Abdominal:      General: There is distension.   Musculoskeletal:         General: Normal range of motion.      Cervical back: Normal range of motion.   Skin:     General: Skin is warm and dry.      Capillary Refill: Capillary refill takes less than 2 seconds.   Neurological:      General: No focal deficit present.      Mental Status: She is alert.      Comments: Slightly lethargic   Psychiatric:         Mood and Affect: Mood normal.         Relevant Results             Results for  orders placed or performed during the hospital encounter of 05/04/24 (from the past 24 hour(s))   POCT GLUCOSE   Result Value Ref Range    POCT Glucose 90 74 - 99 mg/dL   CBC and Auto Differential   Result Value Ref Range    WBC 14.8 (H) 4.4 - 11.3 x10*3/uL    nRBC 0.0 0.0 - 0.0 /100 WBCs    RBC 3.55 (L) 4.00 - 5.20 x10*6/uL    Hemoglobin 11.3 (L) 12.0 - 16.0 g/dL    Hematocrit 34.6 (L) 36.0 - 46.0 %    MCV 98 80 - 100 fL    MCH 31.8 26.0 - 34.0 pg    MCHC 32.7 32.0 - 36.0 g/dL    RDW 14.6 (H) 11.5 - 14.5 %    Platelets 153 150 - 450 x10*3/uL    Immature Granulocytes %, Automated 1.1 (H) 0.0 - 0.9 %    Immature Granulocytes Absolute, Automated 0.16 0.00 - 0.50 x10*3/uL   Renal Function Panel   Result Value Ref Range    Glucose 107 (H) 65 - 99 mg/dL    Sodium 139 133 - 145 mmol/L    Potassium 4.6 3.4 - 5.1 mmol/L    Chloride 100 97 - 107 mmol/L    Bicarbonate 25 24 - 31 mmol/L    Urea Nitrogen 65 (H) 8 - 25 mg/dL    Creatinine 2.30 (H) 0.40 - 1.60 mg/dL    eGFR 19 (L) >60 mL/min/1.73m*2    Calcium 8.7 8.5 - 10.4 mg/dL    Phosphorus 5.2 (H) 2.5 - 4.5 mg/dL    Albumin 3.3 (L) 3.5 - 5.0 g/dL    Anion Gap 14 <=19 mmol/L   Manual Differential   Result Value Ref Range    Neutrophils %, Manual 57.0 40.0 - 80.0 %    Bands %, Manual 31.0 0.0 - 5.0 %    Lymphocytes %, Manual 2.0 13.0 - 44.0 %    Monocytes %, Manual 5.0 2.0 - 10.0 %    Eosinophils %, Manual 0.0 0.0 - 6.0 %    Basophils %, Manual 0.0 0.0 - 2.0 %    Metamyelocytes %, Manual 5.0 0.0 - 0.0 %    Seg Neutrophils Absolute, Manual 8.44 (H) 1.60 - 5.00 x10*3/uL    Bands Absolute, Manual 4.59 (H) 0.00 - 0.50 x10*3/uL    Lymphocytes Absolute, Manual 0.30 (L) 0.80 - 3.00 x10*3/uL    Monocytes Absolute, Manual 0.74 0.05 - 0.80 x10*3/uL    Eosinophils Absolute, Manual 0.00 0.00 - 0.40 x10*3/uL    Basophils Absolute, Manual 0.00 0.00 - 0.10 x10*3/uL    Metamyelocytes Absolute, Manual 0.74 0.00 - 0.00 x10*3/uL    Total Cells Counted 100     Neutrophils Absolute, Manual 13.03  (H) 1.60 - 5.50 x10*3/uL    RBC Morphology No significant RBC morphology present    Protime-INR   Result Value Ref Range    Protime 14.6 (H) 9.3 - 12.7 seconds    INR 1.4 (H) 0.9 - 1.2   POCT GLUCOSE   Result Value Ref Range    POCT Glucose 95 74 - 99 mg/dL   Urinalysis with Reflex Microscopic   Result Value Ref Range    Color, Urine Yellow Light-Yellow, Yellow, Dark-Yellow    Appearance, Urine Turbid (N) Clear    Specific Gravity, Urine 1.025 1.005 - 1.035    pH, Urine 5.0 5.0, 5.5, 6.0, 6.5, 7.0, 7.5, 8.0    Protein, Urine 30 (1+) (A) NEGATIVE, 10 (TRACE), 20 (TRACE) mg/dL    Glucose, Urine Normal Normal mg/dL    Blood, Urine NEGATIVE NEGATIVE    Ketones, Urine NEGATIVE NEGATIVE mg/dL    Bilirubin, Urine 0.5 (1+) (A) NEGATIVE    Urobilinogen, Urine 2 (1+) (A) Normal mg/dL    Nitrite, Urine NEGATIVE NEGATIVE    Leukocyte Esterase, Urine NEGATIVE NEGATIVE   Microscopic Only, Urine   Result Value Ref Range    WBC, Urine 1-5 1-5, NONE /HPF    RBC, Urine 1-2 NONE, 1-2, 3-5 /HPF    Squamous Epithelial Cells, Urine 1-9 (SPARSE) Reference range not established. /HPF    Mucus, Urine FEW Reference range not established. /LPF    Hyaline Casts, Urine 3+ (A) NONE /LPF    XR chest 1 view    Result Date: 5/5/2024  Interpreted By:  Santos Faria, STUDY: XR CHEST 1 VIEW  5/5/2024 1:41 pm   INDICATION: Signs/Symptoms:Chest pain   COMPARISON: 05/05/2024 at 5:55 a.m.   ACCESSION NUMBER(S): [id number]   ORDERING CLINICIAN: FLAQUITA LEE   TECHNIQUE: A single AP portable radiograph of the chest was obtained.   FINDINGS: Multiple cardiac monitoring leads are seen over the chest.  A single lead pacer is seen over the right chest. The right-sided chest tube is unchanged in position. Bilateral basilar airspace consolidations are seen and may represent small pleural effusions, atelectasis and/or pneumonia. No pneumothorax is identified. The cardiac silhouette is enlarged, though not significantly changed.       No significant interval  change in the appearance of the chest, with findings as above.   MACRO: None.   Signed by: Santos Faria 5/5/2024 1:43 PM Dictation workstation:   [id number]    XR chest 1 view    Result Date: 5/5/2024  Interpreted By:  Genna Lara, STUDY: XR CHEST 1 VIEW 5/5/2024 6:14 am   INDICATION: Signs/Symptoms:eval pneumothorax   COMPARISON: 05/04/2024   ACCESSION NUMBER(S): [id number]   ORDERING CLINICIAN: SHANNON HERNÁNDEZ   TECHNIQUE: AP view   FINDINGS: Pacemaker overlies the right chest wall with a single transvenous lead overlying the right ventricle. Pigtail catheter seen in the right upper chest unchanged since the prior exam. Lungs are hyperinflated typical of COPD there are suspected small bilateral pleural effusions. Slight pulmonary venous congestion noted with an enlarged cardiac silhouette. Bilateral lower lobe airspace opacification may be due to atelectasis or infiltrates. Surgical clips are seen in the left axilla.       Signs of CHF, COPD, small bilateral pleural effusions and bilateral lower lobe atelectasis or infiltrates unchanged since the prior exam   Signed by: Genna Lara 5/5/2024 12:58 PM Dictation workstation:   [id number]    XR chest 1 view    Result Date: 5/4/2024  Interpreted By:  Finkelstein, Evan, STUDY: XR CHEST 1 VIEW;  5/4/2024 4:24 am   INDICATION: Signs/Symptoms:Status post chest tube insertion.   COMPARISON: Chest radiograph 05/04/2024   ACCESSION NUMBER(S): [id number]   ORDERING CLINICIAN: SHANDRA MARTINEZ   FINDINGS: Right chest wall pacemaker present. Interval placement of a right-sided pigtail catheter   CARDIOMEDIASTINAL SILHOUETTE: Enlarged cardiac silhouette, similar compared to prior imaging. Calcifications overlie the aortic arch.   LUNGS: There are patchy bibasilar airspace opacities. Persistent small right pneumothorax.   ABDOMEN: No remarkable upper abdominal findings.   BONES: Mildly displaced fractures of the right 7th and 8th ribs.       Interval right pigtail catheter  placement. Persistent small right pneumothorax. Bibasilar airspace opacities, which may represent areas of atelectasis with pulmonary contusions not excluded in the setting of trauma. Mildly displaced fractures of the right 7th and 8th ribs.   MACRO: None.   Signed by: Evan Finkelstein 5/4/2024 4:52 AM Dictation workstation:   [id number]    CT chest wo IV contrast    Result Date: 5/4/2024  Interpreted By:  Rigoberto Varma, STUDY: CT CHEST WO IV CONTRAST;  5/4/2024 3:12 am   INDICATION: Signs/Symptoms:Trauma.   COMPARISON: None.   ACCESSION NUMBER(S): [id number]   ORDERING CLINICIAN: ALLYN HUMPHREY   TECHNIQUE: Helical data acquisition of the chest was obtained  without IV contrast material.  Images were reformatted in axial, coronal, and sagittal planes.   FINDINGS: LUNGS AND AIRWAYS: The trachea and central airways are patent. No endobronchial lesion.   There is a small to moderate-sized right pneumothorax the occupies probably between 10 and 20 % of the volume of the right pleural cavity. Trace right hemothorax. No pleural effusion or pneumothorax on the left.   There is mild asymmetric ground-glass opacity in the right lower lobe with mild subpleural consolidative opacity, which probably relates to atelectasis, possibly with a component of pulmonary contusion. Platelike and reticular opacity in the left lower lung probably relates to atelectasis or scarring. No focal consolidation.   There is a background of pulmonary interstitial prominence with mild smooth interlobular septal thickening in the could relate to chronic changes with or without acute pulmonary interstitial edema/CHF.   MEDIASTINUM AND SHALA, LOWER NECK AND AXILLA: The visualized thyroid gland is within normal limits.   No pathologically enlarged lymph nodes.   Fluid in the esophagus is compatible with gastroesophageal reflux. Circumferential mural thickening of the distal esophagus with submucosal edema compatible with esophagitis; underlying  neoplasm is not excluded. Recommend endoscopic correlation or further evaluation nonemergent outpatient fluoroscopic barium swallow exam. A yellow alert was sent.   HEART AND VESSELS: The thoracic aorta is of caliber and tortuous with moderate vascular calcifications.   Main pulmonary artery and its branches are normal in caliber.   Coronary artery calcifications are seen. The study is not optimized for evaluation of coronary arteries.   Heart is markedly enlarged. Pacing leads in the right atrium and right ventricle.   No evidence of pericardial effusion.   UPPER ABDOMEN: The visualized subdiaphragmatic structures demonstrate no remarkable findings.   CHEST WALL AND OSSEOUS STRUCTURES: Acute displaced fractures of the lateral right 7th through 9th ribs. Right lateral lower chest wall subcutaneous emphysema that extends inferiorly beyond the extent of imaging. Right upper anterior chest wall pacing generator.       Acute displaced fractures of the lateral right 7th through 9th ribs. Right lateral lower chest wall subcutaneous emphysema that extends inferiorly beyond the extent of imaging.   There is a small to moderate-sized right pneumothorax the occupies probably between 10 and 20 % of the volume of the right pleural cavity. Trace right hemothorax. No pleural effusion or pneumothorax on the left.   There is mild asymmetric ground-glass opacity in the right lower lobe with mild subpleural consolidative opacity, which probably relates to atelectasis, possibly with a component of pulmonary contusion. Platelike and reticular opacity in the left lower lung probably relates to atelectasis or scarring. No focal consolidation.   There is a background of pulmonary interstitial prominence with mild smooth interlobular septal thickening in the could relate to chronic changes with or without acute pulmonary interstitial edema/CHF. Atherosclerosis, including coronary artery disease.   Marked cardiomegaly.   Circumferential  mural thickening of the distal esophagus with submucosal edema compatible with esophagitis; underlying neoplasm is not excluded. Recommend endoscopic correlation or further evaluation nonemergent outpatient fluoroscopic barium swallow exam. A yellow alert was sent.   MACRO: Critical Finding:  See findings. Notification was initiated on 5/4/2024 at 3:33 am by  Rigoberto Varma.  (**-YCF-**) Instructions:   Signed by: Rigoberto Varma 5/4/2024 3:33 AM Dictation workstation:   [id number]    CT head wo IV contrast    Result Date: 5/4/2024  Interpreted By:  Rigoberto Varma, STUDY: CT HEAD WO IV CONTRAST; CT CERVICAL SPINE WO IV CONTRAST; ;  5/4/2024 3:11 am   INDICATION: Signs/Symptoms:Trauma.   COMPARISON: None.   ACCESSION NUMBER(S): [id number]; [id number]   ORDERING CLINICIAN: SHANDRA MARTINEZ   TECHNIQUE: Noncontrast CT exams of the head and cervical spine with multiplanar reformations.   FINDINGS:   BRAIN PARENCHYMA: Advanced volume loss.  There is periventricular and subcortical white matter hypoattenuation, most in keeping with chronic microvascular ischemic change.  Gray-white matter interfaces are preserved. No mass, mass effect or midline shift.   HEMORRHAGE: No acute intracranial hemorrhage. VENTRICLES and EXTRA-AXIAL SPACES: Normal size. EXTRACRANIAL SOFT TISSUES: Within normal limits. PARANASAL SINUSES/MASTOIDS: The visualized paranasal sinuses and mastoid air cells are aerated. Mucous retention cyst in the left maxillary sinus. CALVARIUM: No depressed skull fracture. No destructive osseous lesion.   OTHER FINDINGS: None.   CERVICAL SPINE:   ALIGNMENT: Grade 1 degenerative anterolisthesis at C7-T1 and T1-T2. Alignment is otherwise normal. VERTEBRAE: No acute fracture. Vertebral stature is maintained. Endplate osteophytosis and uncovertebral hypertrophy in conjunction with moderate to severe disc height loss, worst at C6-C7. Moderate to severe multilevel hypertrophic facet osteoarthropathy. SPINAL CANAL: No critical  spinal canal stenosis. PREVERTEBRAL SOFT TISSUES: No prevertebral soft tissue swelling. LUNG APICES: Partially imaged right pneumothorax.   OTHER FINDINGS: None.       No evidence of acute intracranial abnormality.   No acute cervical spine fracture or malalignment.   Partially imaged right pneumothorax.     MACRO: None   Signed by: Rigoberto Varma 5/4/2024 3:24 AM Dictation workstation:   [id number]    CT cervical spine wo IV contrast    Result Date: 5/4/2024  Interpreted By:  Rigoberto Varma, STUDY: CT HEAD WO IV CONTRAST; CT CERVICAL SPINE WO IV CONTRAST; ;  5/4/2024 3:11 am   INDICATION: Signs/Symptoms:Trauma.   COMPARISON: None.   ACCESSION NUMBER(S): [id number]; [id number]   ORDERING CLINICIAN: SHANDRA MARTINEZ   TECHNIQUE: Noncontrast CT exams of the head and cervical spine with multiplanar reformations.   FINDINGS:   BRAIN PARENCHYMA: Advanced volume loss.  There is periventricular and subcortical white matter hypoattenuation, most in keeping with chronic microvascular ischemic change.  Gray-white matter interfaces are preserved. No mass, mass effect or midline shift.   HEMORRHAGE: No acute intracranial hemorrhage. VENTRICLES and EXTRA-AXIAL SPACES: Normal size. EXTRACRANIAL SOFT TISSUES: Within normal limits. PARANASAL SINUSES/MASTOIDS: The visualized paranasal sinuses and mastoid air cells are aerated. Mucous retention cyst in the left maxillary sinus. CALVARIUM: No depressed skull fracture. No destructive osseous lesion.   OTHER FINDINGS: None.   CERVICAL SPINE:   ALIGNMENT: Grade 1 degenerative anterolisthesis at C7-T1 and T1-T2. Alignment is otherwise normal. VERTEBRAE: No acute fracture. Vertebral stature is maintained. Endplate osteophytosis and uncovertebral hypertrophy in conjunction with moderate to severe disc height loss, worst at C6-C7. Moderate to severe multilevel hypertrophic facet osteoarthropathy. SPINAL CANAL: No critical spinal canal stenosis. PREVERTEBRAL SOFT TISSUES: No prevertebral soft  tissue swelling. LUNG APICES: Partially imaged right pneumothorax.   OTHER FINDINGS: None.       No evidence of acute intracranial abnormality.   No acute cervical spine fracture or malalignment.   Partially imaged right pneumothorax.     MACRO: None   Signed by: Rigoberto Varma 5/4/2024 3:24 AM Dictation workstation:   [id number]    XR ribs right 2 views w chest pa or ap    Result Date: 5/4/2024  Interpreted By:  Polly Weinberg, STUDY: XR RIBS RIGHT 2 VIEWS WITH CHEST PA OR AP;  5/4/2024 2:31 am   INDICATION: Signs/Symptoms:Trauma.   COMPARISON: None.   ACCESSION NUMBER(S): [id number]   ORDERING CLINICIAN: SHANDRA MARTINEZ   FINDINGS: Right-sided single lead pacer is present with lead overlying the right ventricle. Multiple surgical clips overlie the left axilla   CARDIOMEDIASTINAL SILHOUETTE: Cardiac silhouette is enlarged. Mild interstitial prominence. Atherosclerotic calcification of the aorta.   LUNGS: There is a subtle linear density along the right lateral chest wall adjacent to site of fractures described above. Tiny right-sided pneumothorax can not be excluded. Right basilar atelectasis. No sizeable effusion.   ABDOMEN: No remarkable upper abdominal findings.   BONES: Generalized diffuse osteopenia. There are acute mildly displaced fractures of the right 7th, 8th and 9th ribs posterolaterally. Multilevel degenerative changes of the spine.       Cardiomegaly with mild interstitial prominence which could be chronic or relate to component developing interstitial edema. Correlate clinically.   Acute fractures of the right 7th through 9th ribs as described above. There is a subtle linear density subjacent to the site of fractures. A tiny right-sided pneumothorax can not be excluded. Attention on continued short-term follow-up or further evaluation with CT is suggested.   MACRO: Polly Weinberg discussed the significance and urgency of this critical finding by telephone with  SHANDRA MARTINEZ on 5/4/2024 at 2:43 am.  (**-RCF-**) Findings:  See findings.   Signed by: Polly Weinberg 5/4/2024 2:44 AM Dictation workstation:   [id number]    Assessment/Plan      Principal Problem:    Pneumothorax    MARIA [place] ROSARIO  Creatinine 2.3 this morning, baseline is between 0.9-1  Insert Morales catheter due to distention and low output, only 120 mL visualized on bladder scan this morning  Consult nephrology, recommendations appreciated  IV fluids  Renally dose meds, avoid nephrotoxic medications  Monitor BMP closely     Acute hypoxic respiratory failure  Pneumothorax  Currently on 2L oxygen via [place]  Secondary to above  Monitor pulse ox, wean as tolerated  S/p chest tube placement  CT to water seal without air leak  General surgery managing, CXR tomorrow, if no PTX plan to remove chest tube  Incentive spirometer      Fall   Multiple rib fractures   Imaging as above  Pain management    Atrial fibrillation  S/p recent pacemaker placement 4/19/24  Coumadin currently on hold due to fall, rib fx, pneumo/hemothorax s/p CT placement  Monitor on tele  Daily PT/INR  Continue metoprolol  Will reach out to EP and make aware   Resume Coumadin when cleared by general surgery      Fall  Fall precautions  PT/OT/OOB  Patient and daughter would like patient to go home on discharge     Diabetes Mellitus  Monitor blood glucose  Sliding scale insulin  Hypoglycemia protocol  hgbA1C     Chronic Diastolic Heart Failure  Was given IV Lasix by ICU NP  Daily weights  Monitor I/O  Resume home metoprolol with parameters, will hold losartan due to MARIA DEL ROSARIO    DVT/GI  SCDs, H2 blocker       5/6/2024: With sudden increase in creatinine and phosphorus today.  Low urine output overnight, bladder scan demonstrated 120 mLs but per nursing was unable to visualize bladder completely.  Will insert Morales catheter and consult nephrology.  Also with distended, generalized lower abdominal/suprapubic pain.  CT abdomen pelvis has been ordered.  CBC demonstrates leukocytosis this morning, UA/culture, blood  cultures have been ordered.  Consulted infectious disease.  Will continue to follow.      Ruth Ann Marsh, APRN-CNP

## 2024-05-06 NOTE — CONSULTS
.Reason For Consult  Acute kidney injury and low urine output    History Of Present Illness  Audelia Virgen is a 89 y.o. female who is known to have a history of hyperlipidemia no history of kidney disease baseline creatinine about 0.9 to 1.1 mg/dL who apparently had sustained a fall patient broke right side 3 ribs also she had developed hydrothorax for which she had a chest tube placed patient initially admitted to the intensive care unit today I was asked to see the patient in consultation because of new onset acute kidney injury with rising creatinine level patient is sitting up in chair she does not feel well in general she does have a chest tube on the right side her chest pain on the right side is better she said she is complaining however of abdominal pain ICU nursing staff did place Morales catheter however urine output was low dark she denies any nausea or vomiting no fever no chills.     Review of Systems  Review of Systems   Constitutional:  Positive for fatigue. Negative for chills and fever.   HENT:  Negative for sinus pressure, sore throat and tinnitus.    Eyes:  Negative for photophobia and visual disturbance.   Respiratory:  Negative for apnea, cough, shortness of breath and wheezing.    Cardiovascular:  Positive for chest pain. Negative for palpitations and leg swelling.   Gastrointestinal:  Positive for abdominal distention and abdominal pain. Negative for blood in stool, constipation, diarrhea, nausea, rectal pain and vomiting.   Endocrine: Negative for cold intolerance, heat intolerance, polydipsia, polyphagia and polyuria.   Genitourinary:  Negative for decreased urine volume, dysuria, frequency, hematuria and urgency.   Musculoskeletal:  Positive for myalgias. Negative for arthralgias, back pain, joint swelling and neck pain.   Skin:  Negative for color change, pallor, rash and wound.   Neurological:  Negative for dizziness, tremors, seizures, syncope, speech difficulty, weakness,  light-headedness, numbness and headaches.   Hematological:  Negative for adenopathy. Does not bruise/bleed easily.   Psychiatric/Behavioral:  Negative for confusion, hallucinations, sleep disturbance and suicidal ideas.         Past Medical History  She has no past medical history on file.    Surgical History  She has no past surgical history on file.     Social History  She reports that she has never smoked. She has never used smokeless tobacco. She reports current alcohol use. No history on file for drug use.    Family History  No family history on file.     Current Facility-Administered Medications:     acetaminophen (Tylenol) oral liquid 650 mg, 650 mg, oral, q4h PRN **OR** acetaminophen (Tylenol) tablet 650 mg, 650 mg, oral, q4h PRN, Linda Sanchez PA-C, 650 mg at 05/05/24 2007    allopurinol (Zyloprim) tablet 100 mg, 100 mg, oral, Nightly, Linda Sanchez PA-C, 100 mg at 05/05/24 2100    [Held by provider] calcium carbonate (Oscal) tablet 1,250 mg, 1,250 mg, oral, Daily, Linda Sanchez PA-C    dextrose 50 % injection 12.5 g, 12.5 g, intravenous, q15 min PRN, Linda Sanchez PA-C    dextrose 50 % injection 25 g, 25 g, intravenous, q15 min PRN, Linda Sanchez PA-C    enoxaparin (Lovenox) syringe 30 mg, 30 mg, subcutaneous, Daily, Linda Sanchez PA-C, 30 mg at 05/06/24 0920    glucagon (Glucagen) injection 1 mg, 1 mg, intramuscular, q15 min PRN, Linda Sanchez PA-C    glucagon (Glucagen) injection 1 mg, 1 mg, intramuscular, q15 min PRN, Linda Sanchez PA-C    insulin lispro (HumaLOG) injection 0-5 Units, 0-5 Units, subcutaneous, Before meals & nightly, Linda Sanchez PA-C    lidocaine 4 % patch 1 patch, 1 patch, transdermal, Daily, Linda Sanchez PA-C, 1 patch at 05/06/24 0920    [Held by provider] losartan (Cozaar) tablet 12.5 mg, 12.5 mg, oral, Daily, Linda Sanchez PA-C, 12.5 mg at 05/05/24 0831    metoprolol tartrate (Lopressor) tablet 12.5 mg, 12.5 mg, oral, BID, Linda Sanchez PA-C, 12.5 mg at  05/06/24 0920    ondansetron ODT (Zofran-ODT) disintegrating tablet 4 mg, 4 mg, oral, q8h PRN **OR** ondansetron (Zofran) injection 4 mg, 4 mg, intravenous, q8h PRN, Linda Sanchez PA-C, 4 mg at 05/05/24 1241    oxygen (O2) therapy, , inhalation, Continuous PRN - O2/gases, Linda Sanchez PA-C    polyethylene glycol (Glycolax, Miralax) packet 17 g, 17 g, oral, Daily PRN, Linda Sanchez PA-C    prochlorperazine (Compazine) injection 5 mg, 5 mg, intravenous, q6h PRN, Carmencita Guevara DO, 5 mg at 05/05/24 1609    sodium chloride 0.9% infusion, 100 mL/hr, intravenous, Continuous, Derick Herrera MD, Last Rate: 100 mL/hr at 05/06/24 0926, 100 mL/hr at 05/06/24 0926    traMADol (Ultram) tablet 50 mg, 50 mg, oral, q8h PRN, JAIMIE Joaquin-CNP    [Held by provider] warfarin (Coumadin) tablet 5 mg, 5 mg, oral, Daily, Linda Sanchez PA-C   Allergies  Patient has no known allergies.         Physical Exam  Physical Exam  Constitutional:       General: She is not in acute distress.     Appearance: She is not toxic-appearing.   HENT:      Head: Normocephalic and atraumatic.   Eyes:      Extraocular Movements: Extraocular movements intact.      Pupils: Pupils are equal, round, and reactive to light.   Neck:      Vascular: No carotid bruit.   Cardiovascular:      Rate and Rhythm: Normal rate. Rhythm irregular.   Pulmonary:      Effort: No respiratory distress.      Breath sounds: No stridor. No wheezing, rhonchi or rales.      Comments: Right-sided chest tube  Chest:      Chest wall: No tenderness.   Abdominal:      General: There is distension.      Palpations: Abdomen is soft. There is no mass.      Tenderness: There is abdominal tenderness. There is no right CVA tenderness, left CVA tenderness or guarding.      Hernia: No hernia is present.      Comments: Diminished bowel sounds   Musculoskeletal:         General: No swelling or tenderness.      Cervical back: No rigidity.      Right lower leg: No edema.      Left lower  leg: No edema.   Lymphadenopathy:      Cervical: No cervical adenopathy.   Skin:     General: Skin is warm and dry.      Coloration: Skin is not jaundiced or pale.      Findings: No bruising or erythema.   Neurological:      General: No focal deficit present.      Mental Status: She is alert and oriented to person, place, and time.   Psychiatric:         Mood and Affect: Mood normal.         Behavior: Behavior normal.              I&O 24HR    Intake/Output Summary (Last 24 hours) at 5/6/2024 0914  Last data filed at 5/6/2024 0425  Gross per 24 hour   Intake 50 ml   Output 50 ml   Net 0 ml       Vitals 24HR  Heart Rate:  [60-86]   Temp:  [36.2 °C (97.2 °F)-38.2 °C (100.8 °F)]   Resp:  [17-31]   BP: (103-129)/(46-65)   Weight:  [59.3 kg (130 lb 11.7 oz)]   SpO2:  [92 %-95 %]     Relevant Results        Results for orders placed or performed during the hospital encounter of 05/04/24 (from the past 96 hour(s))   Comprehensive metabolic panel   Result Value Ref Range    Glucose 120 (H) 65 - 99 mg/dL    Sodium 142 133 - 145 mmol/L    Potassium 4.1 3.4 - 5.1 mmol/L    Chloride 104 97 - 107 mmol/L    Bicarbonate 26 24 - 31 mmol/L    Urea Nitrogen 34 (H) 8 - 25 mg/dL    Creatinine 1.10 0.40 - 1.60 mg/dL    eGFR 47 (L) >60 mL/min/1.73m*2    Calcium 9.5 8.5 - 10.4 mg/dL    Albumin 3.8 3.5 - 5.0 g/dL    Alkaline Phosphatase 61 35 - 125 U/L    Total Protein 6.8 5.9 - 7.9 g/dL    AST 23 5 - 40 U/L    Bilirubin, Total 0.6 0.1 - 1.2 mg/dL    ALT 15 5 - 40 U/L    Anion Gap 12 <=19 mmol/L   Coagulation Screen   Result Value Ref Range    Protime 19.0 (H) 9.3 - 12.7 seconds    INR 1.9 (H) 0.9 - 1.2    aPTT 31.3 22.0 - 32.5 seconds   Fibrinogen   Result Value Ref Range    Fibrinogen 335 200 - 400 mg/dL   Magnesium   Result Value Ref Range    Magnesium 1.60 1.60 - 3.10 mg/dL   Phosphorus   Result Value Ref Range    Phosphorus 3.5 2.5 - 4.5 mg/dL   POCT GLUCOSE   Result Value Ref Range    POCT Glucose 105 (H) 74 - 99 mg/dL   POCT GLUCOSE    Result Value Ref Range    POCT Glucose 147 (H) 74 - 99 mg/dL   POCT GLUCOSE   Result Value Ref Range    POCT Glucose 111 (H) 74 - 99 mg/dL   CBC and Auto Differential   Result Value Ref Range    WBC 8.4 4.4 - 11.3 x10*3/uL    nRBC 0.0 0.0 - 0.0 /100 WBCs    RBC 3.31 (L) 4.00 - 5.20 x10*6/uL    Hemoglobin 10.6 (L) 12.0 - 16.0 g/dL    Hematocrit 32.4 (L) 36.0 - 46.0 %    MCV 98 80 - 100 fL    MCH 32.0 26.0 - 34.0 pg    MCHC 32.7 32.0 - 36.0 g/dL    RDW 14.6 (H) 11.5 - 14.5 %    Platelets 141 (L) 150 - 450 x10*3/uL    Neutrophils % 65.6 40.0 - 80.0 %    Immature Granulocytes %, Automated 0.2 0.0 - 0.9 %    Lymphocytes % 19.6 13.0 - 44.0 %    Monocytes % 12.2 2.0 - 10.0 %    Eosinophils % 1.7 0.0 - 6.0 %    Basophils % 0.7 0.0 - 2.0 %    Neutrophils Absolute 5.52 (H) 1.60 - 5.50 x10*3/uL    Immature Granulocytes Absolute, Automated 0.02 0.00 - 0.50 x10*3/uL    Lymphocytes Absolute 1.65 0.80 - 3.00 x10*3/uL    Monocytes Absolute 1.03 (H) 0.05 - 0.80 x10*3/uL    Eosinophils Absolute 0.14 0.00 - 0.40 x10*3/uL    Basophils Absolute 0.06 0.00 - 0.10 x10*3/uL   Basic metabolic panel   Result Value Ref Range    Glucose 102 (H) 65 - 99 mg/dL    Sodium 140 133 - 145 mmol/L    Potassium 4.0 3.4 - 5.1 mmol/L    Chloride 103 97 - 107 mmol/L    Bicarbonate 28 24 - 31 mmol/L    Urea Nitrogen 44 (H) 8 - 25 mg/dL    Creatinine 1.40 0.40 - 1.60 mg/dL    eGFR 35 (L) >60 mL/min/1.73m*2    Calcium 8.8 8.5 - 10.4 mg/dL    Anion Gap 9 <=19 mmol/L   Magnesium   Result Value Ref Range    Magnesium 2.10 1.60 - 3.10 mg/dL   Protime-INR   Result Value Ref Range    Protime 18.5 (H) 9.3 - 12.7 seconds    INR 1.8 (H) 0.9 - 1.2   POCT GLUCOSE   Result Value Ref Range    POCT Glucose 90 74 - 99 mg/dL   CBC and Auto Differential   Result Value Ref Range    WBC 14.8 (H) 4.4 - 11.3 x10*3/uL    nRBC 0.0 0.0 - 0.0 /100 WBCs    RBC 3.55 (L) 4.00 - 5.20 x10*6/uL    Hemoglobin 11.3 (L) 12.0 - 16.0 g/dL    Hematocrit 34.6 (L) 36.0 - 46.0 %    MCV 98 80 - 100  fL    MCH 31.8 26.0 - 34.0 pg    MCHC 32.7 32.0 - 36.0 g/dL    RDW 14.6 (H) 11.5 - 14.5 %    Platelets 153 150 - 450 x10*3/uL    Immature Granulocytes %, Automated 1.1 (H) 0.0 - 0.9 %    Immature Granulocytes Absolute, Automated 0.16 0.00 - 0.50 x10*3/uL   Renal Function Panel   Result Value Ref Range    Glucose 107 (H) 65 - 99 mg/dL    Sodium 139 133 - 145 mmol/L    Potassium 4.6 3.4 - 5.1 mmol/L    Chloride 100 97 - 107 mmol/L    Bicarbonate 25 24 - 31 mmol/L    Urea Nitrogen 65 (H) 8 - 25 mg/dL    Creatinine 2.30 (H) 0.40 - 1.60 mg/dL    eGFR 19 (L) >60 mL/min/1.73m*2    Calcium 8.7 8.5 - 10.4 mg/dL    Phosphorus 5.2 (H) 2.5 - 4.5 mg/dL    Albumin 3.3 (L) 3.5 - 5.0 g/dL    Anion Gap 14 <=19 mmol/L   Manual Differential   Result Value Ref Range    Neutrophils %, Manual 57.0 40.0 - 80.0 %    Bands %, Manual 31.0 0.0 - 5.0 %    Lymphocytes %, Manual 2.0 13.0 - 44.0 %    Monocytes %, Manual 5.0 2.0 - 10.0 %    Eosinophils %, Manual 0.0 0.0 - 6.0 %    Basophils %, Manual 0.0 0.0 - 2.0 %    Metamyelocytes %, Manual 5.0 0.0 - 0.0 %    Seg Neutrophils Absolute, Manual 8.44 (H) 1.60 - 5.00 x10*3/uL    Bands Absolute, Manual 4.59 (H) 0.00 - 0.50 x10*3/uL    Lymphocytes Absolute, Manual 0.30 (L) 0.80 - 3.00 x10*3/uL    Monocytes Absolute, Manual 0.74 0.05 - 0.80 x10*3/uL    Eosinophils Absolute, Manual 0.00 0.00 - 0.40 x10*3/uL    Basophils Absolute, Manual 0.00 0.00 - 0.10 x10*3/uL    Metamyelocytes Absolute, Manual 0.74 0.00 - 0.00 x10*3/uL    Total Cells Counted 100     Neutrophils Absolute, Manual 13.03 (H) 1.60 - 5.50 x10*3/uL    RBC Morphology No significant RBC morphology present    Protime-INR   Result Value Ref Range    Protime 14.6 (H) 9.3 - 12.7 seconds    INR 1.4 (H) 0.9 - 1.2   POCT GLUCOSE   Result Value Ref Range    POCT Glucose 95 74 - 99 mg/dL          Assessment/Plan     XR chest 1 view    Result Date: 5/5/2024  Interpreted By:  Santos Faria, STUDY: XR CHEST 1 VIEW  5/5/2024 1:41 pm   INDICATION:  Signs/Symptoms:Chest pain   COMPARISON: 05/05/2024 at 5:55 a.m.   ACCESSION NUMBER(S): [id number]   ORDERING CLINICIAN: FLAQUITA LEE   TECHNIQUE: A single AP portable radiograph of the chest was obtained.   FINDINGS: Multiple cardiac monitoring leads are seen over the chest.  A single lead pacer is seen over the right chest. The right-sided chest tube is unchanged in position. Bilateral basilar airspace consolidations are seen and may represent small pleural effusions, atelectasis and/or pneumonia. No pneumothorax is identified. The cardiac silhouette is enlarged, though not significantly changed.       No significant interval change in the appearance of the chest, with findings as above.   MACRO: None.   Signed by: Santos Faria 5/5/2024 1:43 PM Dictation workstation:   [id number]    XR chest 1 view    Result Date: 5/5/2024  Interpreted By:  Genna Lara, STUDY: XR CHEST 1 VIEW 5/5/2024 6:14 am   INDICATION: Signs/Symptoms:eval pneumothorax   COMPARISON: 05/04/2024   ACCESSION NUMBER(S): [id number]   ORDERING CLINICIAN: SHANNON HERNÁNDEZ   TECHNIQUE: AP view   FINDINGS: Pacemaker overlies the right chest wall with a single transvenous lead overlying the right ventricle. Pigtail catheter seen in the right upper chest unchanged since the prior exam. Lungs are hyperinflated typical of COPD there are suspected small bilateral pleural effusions. Slight pulmonary venous congestion noted with an enlarged cardiac silhouette. Bilateral lower lobe airspace opacification may be due to atelectasis or infiltrates. Surgical clips are seen in the left axilla.       Signs of CHF, COPD, small bilateral pleural effusions and bilateral lower lobe atelectasis or infiltrates unchanged since the prior exam   Signed by: Genna Lara 5/5/2024 12:58 PM Dictation workstation:   [id number]     Impression:  Acute kidney injury etiology this could be very well prerenal etiology from dehydration oral intake however rule out other etiologies  such as obstruction acute rhabdomyolysis acute tubular necrosis  Status post a fall  Right side ribs fracture  Hydrothorax status post chest tube placement  Chronic atrial fibrillation  Anemia possibly from blood loss    Recommendations:  Urine studies including urine sodium, urine osmolarity, and urine creatinine and calculate FENA  CT scan of the abdomen pelvis without IV contrast  Check CPK level  Increase IV fluids to 100 mL an hour  Avoid nephrotoxic medications  At this time there is no indication for dialysis therapy continue to monitor renal function very closely    Thank you very much for your consultation  I spent 60 minutes in the professional and overall care of this patient.      Derick Herrera, [facility]

## 2024-05-06 NOTE — PROGRESS NOTES
"Audelia Virgen is a 89 y.o. female on day 2 of admission presenting with Pneumothorax.    Subjective   Patient's creatinine doubled overnight. Currently having labs drawn. CT in place to right chest without air leak.    Objective     Physical Exam  Vitals and nursing note reviewed.   Constitutional:       Appearance: Normal appearance.   HENT:      Head: Normocephalic and atraumatic.   Cardiovascular:      Rate and Rhythm: Normal rate.   Pulmonary:      Effort: Pulmonary effort is normal. No respiratory distress.   Chest:      Comments: Right sided chest tube in place    Abdominal:      General: Abdomen is flat. Bowel sounds are normal. There is distension.      Palpations: Abdomen is soft.   Musculoskeletal:         General: No swelling or tenderness.   Skin:     General: Skin is warm and dry.   Neurological:      Mental Status: She is alert and oriented to person, place, and time.         Last Recorded Vitals  Blood pressure 114/65, pulse 75, temperature 37.2 °C (99 °F), temperature source Temporal, resp. rate 18, height 1.626 m (5' 4.02\"), weight 59.3 kg (130 lb 11.7 oz), SpO2 94%.  Intake/Output last 3 Shifts:  I/O last 3 completed shifts:  In: 350 (5.9 mL/kg) [P.O.:350]  Out: 550 (9.3 mL/kg) [Urine:450 (0.2 mL/kg/hr); Chest Tube:100]  Weight: 59.3 kg     Relevant Results  Lab Results   Component Value Date    WBC 14.8 (H) 05/06/2024    HGB 11.3 (L) 05/06/2024    HCT 34.6 (L) 05/06/2024    MCV 98 05/06/2024     05/06/2024     Lab Results   Component Value Date    GLUCOSE 107 (H) 05/06/2024    CALCIUM 8.7 05/06/2024     05/06/2024    K 4.6 05/06/2024    CO2 25 05/06/2024     05/06/2024    BUN 65 (H) 05/06/2024    CREATININE 2.30 (H) 05/06/2024       Assessment/Plan   Principal Problem:    Pneumothorax  5/6: Chest xray ordered for this morning. If no pneumo, will plan to remove CT today. Continue with PT/OT. Nephrology consulted for MARIA DEL ROSARIO. OK to restart coumadin    89 yo woman s/p fall with right " sided rib fractures and PTX s/p pigtail chest tube placement  - Chest tube to water seal  - Repeat xray in morning, if no PTX, then chest tube can be removed  - PT/OT  - Patient and daughter would prefer that she discharge to home         I spent 30 minutes in the professional and overall care of this patient.      Giovanni Nelson, APRN-CNP

## 2024-05-06 NOTE — CARE PLAN
Problem: Pain - Adult  Goal: Verbalizes/displays adequate comfort level or baseline comfort level  5/6/2024 1959 by Dean Kolb RN  Outcome: Progressing  5/6/2024 1958 by Dean Kolb RN  Outcome: Progressing     Problem: Safety - Adult  Goal: Free from fall injury  5/6/2024 1959 by Dean Kolb RN  Outcome: Progressing  5/6/2024 1958 by Dean Kolb RN  Outcome: Progressing     Problem: Discharge Planning  Goal: Discharge to home or other facility with appropriate resources  5/6/2024 1959 by Dean Kolb RN  Outcome: Progressing  5/6/2024 1958 by Dean Kolb RN  Outcome: Progressing     Problem: Chronic Conditions and Co-morbidities  Goal: Patient's chronic conditions and co-morbidity symptoms are monitored and maintained or improved  5/6/2024 1959 by Dean Kolb RN  Outcome: Progressing  5/6/2024 1958 by Dean Kolb RN  Outcome: Progressing     Problem: Fall/Injury  Goal: Not fall by end of shift  5/6/2024 1959 by Dean Kolb RN  Outcome: Progressing  5/6/2024 1958 by Dean Kolb RN  Outcome: Progressing  Goal: Be free from injury by end of the shift  5/6/2024 1959 by Dean Kolb RN  Outcome: Progressing  5/6/2024 1958 by Dean Kolb RN  Outcome: Progressing  Goal: Verbalize understanding of personal risk factors for fall in the hospital  5/6/2024 1959 by Dean Kolb RN  Outcome: Progressing  5/6/2024 1958 by Dean Kolb RN  Outcome: Progressing  Goal: Verbalize understanding of risk factor reduction measures to prevent injury from fall in the home  5/6/2024 1959 by Dean Kolb RN  Outcome: Progressing  5/6/2024 1958 by Dean Kolb RN  Outcome: Progressing  Goal: Pace activities to prevent fatigue by end of the shift  5/6/2024 1959 by Dean Kolb RN  Outcome: Progressing  5/6/2024 1958 by Dean Kolb RN  Outcome: Progressing     Problem: Skin  Goal: Decreased wound size/increased tissue granulation at next  dressing change  5/6/2024 1959 by Dean Kolb RN  Outcome: Progressing  5/6/2024 1958 by Dean Kolb RN  Outcome: Progressing  Goal: Participates in plan/prevention/treatment measures  5/6/2024 1959 by Dean Kolb RN  Outcome: Progressing  5/6/2024 1958 by Dean Kolb RN  Outcome: Progressing  Goal: Prevent/manage excess moisture  5/6/2024 1959 by Dean Kolb RN  Outcome: Progressing  5/6/2024 1958 by Dean Kolb RN  Outcome: Progressing  Goal: Prevent/minimize sheer/friction injuries  5/6/2024 1959 by Dean Kolb, RN  Outcome: Progressing  5/6/2024 1958 by Dean Kolb RN  Outcome: Progressing  Goal: Promote/optimize nutrition  5/6/2024 1959 by Dean Kolb, RN  Outcome: Progressing  5/6/2024 1958 by Dean Kolb, RN  Outcome: Progressing  Goal: Promote skin healing  5/6/2024 1959 by Dean Kolb, RN  Outcome: Progressing  5/6/2024 1958 by Dean Kolb RN  Outcome: Progressing     Problem: Pain  Goal: Takes deep breaths with improved pain control throughout the shift  5/6/2024 1959 by Dean Kolb, RN  Outcome: Progressing  5/6/2024 1958 by Dean Kolb RN  Outcome: Progressing  Goal: Turns in bed with improved pain control throughout the shift  5/6/2024 1959 by Dean Kolb RN  Outcome: Progressing  5/6/2024 1958 by Dean Kolb RN  Outcome: Progressing  Goal: Walks with improved pain control throughout the shift  5/6/2024 1959 by Dean Kolb RN  Outcome: Progressing  5/6/2024 1958 by Dean Kolb RN  Outcome: Progressing  Goal: Performs ADL's with improved pain control throughout shift  5/6/2024 1959 by Dean Kolb, RN  Outcome: Progressing  5/6/2024 1958 by Dean Kolb RN  Outcome: Progressing  Goal: Participates in PT with improved pain control throughout the shift  5/6/2024 1959 by Dean Kolb, RN  Outcome: Progressing  5/6/2024 1958 by Dean Kolb, RN  Outcome: Progressing  Goal: Free from opioid  side effects throughout the shift  5/6/2024 1959 by Dean Kolb RN  Outcome: Progressing  5/6/2024 1958 by Dean Kolb RN  Outcome: Progressing  Goal: Free from acute confusion related to pain meds throughout the shift  5/6/2024 1959 by Dean Kolb RN  Outcome: Progressing  5/6/2024 1958 by Dean Kolb RN  Outcome: Progressing     Problem: Pain  Goal: STG - Patients pain is managed to allow active participation in daily activities  5/6/2024 1959 by Dean Kolb RN  Outcome: Progressing  5/6/2024 1958 by Dean Kolb RN  Outcome: Progressing     Problem: Bathing  Goal: STG - Patient will bathe upper body with close vsupervision  5/6/2024 1959 by Dean Kolb RN  Outcome: Progressing  5/6/2024 1958 by Dean Kolb RN  Outcome: Progressing     Problem: Dressing Upper Extremities  Goal: LTG - Patient will complete upper body dressing with close supervision.  5/6/2024 1959 by Dean Kolb RN  Outcome: Progressing  5/6/2024 1958 by Dean Kolb RN  Outcome: Progressing     Problem: Eating  Goal: LTG - Patient will feed self independently  5/6/2024 1959 by Dean Kolb RN  Outcome: Progressing  5/6/2024 1958 by Dean Kolb RN  Outcome: Progressing     Problem: Grooming  Goal: STG - Patient will perform  grooming independent with set up  5/6/2024 1959 by Dean Kolb RN  Outcome: Progressing  5/6/2024 1958 by Dean Kolb RN  Outcome: Progressing     Problem: Toileting  Goal: STG - Patient will complete toileting tasks with close supervision and 2ww  5/6/2024 1959 by Dean Kolb RN  Outcome: Progressing  5/6/2024 1958 by Dean Kolb RN  Outcome: Progressing   The patient's goals for the shift include  safety    The clinical goals for the shift include comfort    Over the shift, the patient did not make progress toward the following goals. Barriers to progression include none. Recommendations to address these barriers include none.

## 2024-05-06 NOTE — NURSING NOTE
Pt is sleeping, showing no signs of distress or discomfort. Dinner tray ordered for pt so there is a meal available when she wakes up. Pt has not eaten today.

## 2024-05-06 NOTE — CARE PLAN
Problem: Functional Balance  Goal: STG - Maintains static sitting balance with upper extremity support and close supervision during functional activity  Description: I  Outcome: Progressing     Problem: Bathing  Goal: STG - Patient will bathe upper body with close supervision  Outcome: Progressing     Problem: Dressing Upper Extremities  Goal: LTG - Patient will complete upper body dressing with close supervision.  Outcome: Progressing     Problem: Eating  Goal: LTG - Patient will feed self independently  Outcome: Progressing     Problem: Grooming  Goal: STG - Patient will perform  grooming independent with set up  Outcome: Progressing     Problem: Toileting  Goal: STG - Patient will complete toileting tasks with close supervision and 2ww  Outcome: Progressing     Problem: OT Transfers  Goal: LTG - Patient will transfer from one surface to another with close supervision and 2ww  Outcome: Progressing

## 2024-05-06 NOTE — NURSING NOTE
Assumed care of pt. Pt resting in bed,repositioned for comfort, mouth moisturizer applied and water given. Pt has no other needs at this time

## 2024-05-06 NOTE — CARE PLAN
Problem: Pain - Adult  Goal: Verbalizes/displays adequate comfort level or baseline comfort level  Outcome: Progressing     Problem: Safety - Adult  Goal: Free from fall injury  Outcome: Progressing     Problem: Discharge Planning  Goal: Discharge to home or other facility with appropriate resources  Outcome: Progressing     Problem: Chronic Conditions and Co-morbidities  Goal: Patient's chronic conditions and co-morbidity symptoms are monitored and maintained or improved  Outcome: Progressing     Problem: Fall/Injury  Goal: Not fall by end of shift  Outcome: Progressing  Goal: Be free from injury by end of the shift  Outcome: Progressing  Goal: Verbalize understanding of personal risk factors for fall in the hospital  Outcome: Progressing  Goal: Verbalize understanding of risk factor reduction measures to prevent injury from fall in the home  Outcome: Progressing  Goal: Pace activities to prevent fatigue by end of the shift  Outcome: Progressing     Problem: Skin  Goal: Decreased wound size/increased tissue granulation at next dressing change  Outcome: Progressing  Goal: Participates in plan/prevention/treatment measures  Outcome: Progressing  Goal: Prevent/manage excess moisture  Outcome: Progressing  Goal: Prevent/minimize sheer/friction injuries  Outcome: Progressing  Goal: Promote/optimize nutrition  Outcome: Progressing  Goal: Promote skin healing  Outcome: Progressing     Problem: Pain  Goal: Takes deep breaths with improved pain control throughout the shift  Outcome: Progressing  Goal: Turns in bed with improved pain control throughout the shift  Outcome: Progressing  Goal: Walks with improved pain control throughout the shift  Outcome: Progressing  Goal: Performs ADL's with improved pain control throughout shift  Outcome: Progressing  Goal: Participates in PT with improved pain control throughout the shift  Outcome: Progressing  Goal: Free from opioid side effects throughout the shift  Outcome:  Progressing  Goal: Free from acute confusion related to pain meds throughout the shift  Outcome: Progressing     Problem: Pain  Goal: STG - Patients pain is managed to allow active participation in daily activities  Outcome: Progressing     Problem: Bathing  Goal: STG - Patient will bathe upper body with close vsupervision  Outcome: Progressing     Problem: Dressing Upper Extremities  Goal: LTG - Patient will complete upper body dressing with close supervision.  Outcome: Progressing     Problem: Eating  Goal: LTG - Patient will feed self independently  Outcome: Progressing     Problem: Grooming  Goal: STG - Patient will perform  grooming independent with set up  Outcome: Progressing     Problem: Toileting  Goal: STG - Patient will complete toileting tasks with close supervision and 2ww  Outcome: Progressing   The patient's goals for the shift include  safety    The clinical goals for the shift include comfort    Over the shift, the patient did not make progress toward the following goals. Barriers to progression include none. Recommendations to address these barriers include none.

## 2024-05-06 NOTE — PROGRESS NOTES
"Music Therapy Note    Audelia Virgen was referred by     Therapy Session  Referral Type: New referral this admission  Visit Type: New visit  Session Start Time: 1256  Intervention Delivery: In-person  Conflict of Service: None  Number of family members present: 1     Pre-assessment  Unable to Assess Reason: Outcomes not applicable  Mood/Affect: Tired/lethargic         Treatment/Interventions  Music Therapy Interventions: Assessment  Interruption: No  Patient Fell Asleep at End of Session: No    Post-assessment  Unable to Assess Reason: Outcomes not applicable  Continue Visiting: Yes    Narrative  Assessment Detail: Pt lying in bed, daughter at bedside. [place]. MT explained services, and pt appeared tired and in distress. Reports enjoying music \"sometimes.\" Agreed to a follow up another time.    Education Documentation  No documentation found.          "

## 2024-05-06 NOTE — PROGRESS NOTES
Physical Therapy    Physical Therapy Treatment    Patient Name: Audelia Virgen  MRN: [id number]  Today's Date: 5/6/2024  Time Calculation  Start Time: 0848  Stop Time: 0915  Time Calculation (min): 27 min     Assessment/Plan   PT Assessment  PT Assessment Results: Decreased strength, Decreased endurance, Impaired balance, Decreased mobility, Decreased coordination, Impaired judgement, Decreased safety awareness, Pain  Rehab Prognosis: Good  Evaluation/Treatment Tolerance: Patient tolerated treatment well, Patient limited by fatigue  Medical Staff Made Aware: Yes  Strengths: Ability to acquire knowledge, Premorbid level of function  Barriers to Participation: Support of Caregivers  End of Session Communication: Bedside nurse  Assessment Comment: Decline in functional mobility status as compared to yesterday's initial evaluation requiring max assist x 1-2 for bed mobility/transfers this date; pt remains a high falls risk and appropriate for mod intensity therapy needs upon d/c.  End of Session Patient Position: Up in chair, Alarm off, caregiver present (daughter present, RN aware)  PT Plan  Inpatient/Swing Bed or Outpatient: Inpatient  PT Plan  Treatment/Interventions: Bed mobility, Transfer training, Gait training, Stair training, Balance training, Neuromuscular re-education, Strengthening, Endurance training, Therapeutic exercise, Therapeutic activity  PT Plan: Skilled PT  PT Frequency: 5 times per week  PT Discharge Recommendations: Moderate intensity level of continued care  Equipment Recommended upon Discharge: Wheeled walker  PT Recommended Transfer Status: Assist x2  PT - OK to Discharge: Yes    General Visit Information:   PT  Visit  PT Received On: 05/06/24  Response to Previous Treatment: Patient with no complaints from previous session.  General  Reason for Referral: impaired functional mobility  Referred By: Wilfredo Valle PA-C  Past Medical History Relevant to Rehab: pacemaker  Missed Visit: No  Missed  Visit Reason: Other (Comment)  Family/Caregiver Present: Yes  Caregiver Feedback: daughter present  Co-Treatment: OT  Co-Treatment Reason: medically complex pt in ICU requiring two-person skilled assist for safe pt handling this date.  Prior to Session Communication: Bedside nurse  Patient Position Received: Bed, 3 rail up, Alarm off, not on at start of session  Preferred Learning Style: verbal  General Comment: Pt cleared for therapy via RN, received in supine, NAD, agreeable to participate in therapy with encouragement. (+) 3L O2 via NC, chest tube, telemetry, Morales.    Subjective   Precautions:  Precautions  Hearing/Visual Limitations: glasses; B hearing aids  Medical Precautions: Fall precautions, Chest tube, Oxygen therapy device and L/min, Other (comment) (R pacemaker precautions)  Post-Surgical Precautions: Other (comment) (R pacemaker precautions)  Vital Signs:  Vital Signs  Heart Rate: 75  SpO2: 94 %    Objective   Pain:  Pain Assessment  Pain Assessment: FLACC (Face, Legs, Activity, Cry, Consolability)  Pain Score: 5 - Moderate pain  Pain Type: Acute pain  Pain Location: Abdomen  Pain Interventions: Repositioned, Other (Comment) (RN aware)  Cognition:  Cognition  Overall Cognitive Status: Within Functional Limits  Orientation Level: Oriented X4  Postural Control:  Postural Control  Postural Control: Within Functional Limits  Static Sitting Balance  Static Sitting-Balance Support: Feet supported, Bilateral upper extremity supported  Static Sitting-Level of Assistance: Maximum assistance  Static Sitting-Comment/Number of Minutes: poor static seated balance on EOB this date with heavy R lateral lean, unable to correct with cueing from therapist  Static Standing Balance  Static Standing-Balance Support: Bilateral upper extremity supported  Static Standing-Level of Assistance: Maximum assistance (x2)  Extremity/Trunk Assessments:  RLE   RLE : Within Functional Limits  LLE   LLE : Within Functional  Limits  Activity Tolerance:  Activity Tolerance  Endurance: Decreased tolerance for upright activites  Treatments:  Therapeutic Exercise  Therapeutic Exercise Performed: Yes (increased time/effort to complete; cueing to remain on task and utilize full AROM)  Therapeutic Exercise Activity 1: B ankle pumps x 20  Therapeutic Exercise Activity 2: B seated knee ext x 10  Therapeutic Exercise Activity 3: B seated hip flex x 10    Bed Mobility  Bed Mobility: Yes  Bed Mobility 1  Bed Mobility 1: Supine to sitting  Level of Assistance 1: Maximum assistance  Bed Mobility Comments 1: assist for trunk elevation and advancement of BLE off EOB    Ambulation/Gait Training  Ambulation/Gait Training Performed: No  Transfers  Transfer: Yes  Transfer 1  Transfer From 1: Bed to  Transfer to 1: Chair with arms  Technique 1: Stand pivot  Transfer Level of Assistance 1: Maximum assistance, +2  Trials/Comments 1: stand-pivot transfer to bedside chair with max assist x 1-2    Stairs  Stairs: No    Outcome Measures:  [facility] Basic Mobility  Turning from your back to your side while in a flat bed without using bedrails: A lot  Moving from lying on your back to sitting on the side of a flat bed without using bedrails: A lot  Moving to and from bed to chair (including a wheelchair): Total  Standing up from a chair using your arms (e.g. wheelchair or bedside chair): Total  To walk in hospital room: Total  Climbing 3-5 steps with railing: Total  Basic Mobility - Total Score: 8    Education Documentation  Mobility Training, taught by Karissa Schroeder PT at 5/6/2024 10:15 AM.  Learner: Patient  Readiness: Acceptance  Method: Demonstration, Explanation  Response: Needs Reinforcement    Education Comments  No comments found.      OP EDUCATION:  Outpatient Education  Individual(s) Educated: Patient, Child  Education Provided: Fall Risk, POC  Risk and Benefits Discussed with Patient/Caregiver/Other: yes  Patient/Caregiver Demonstrated Understanding:  yes  Plan of Care Discussed and Agreed Upon: yes  Patient Response to Education: Patient/Caregiver Verbalized Understanding of Information    Encounter Problems       Encounter Problems (Active)       Mobility       STG - Patient will ambulate 150' with LRAD and modified independence. (Not Progressing)       Start:  05/05/24    Expected End:  05/19/24            STG - Patient will ascend and descend two stairs with use of B handrails and modified independence. (Not Progressing)       Start:  05/05/24    Expected End:  05/19/24               PT Transfers       STG - Patient to transfer to and from sit to supine with independence. (Not Progressing)       Start:  05/05/24    Expected End:  05/19/24            STG - Patient will transfer sit to and from stand with LRAD and modified independence. (Not Progressing)       Start:  05/05/24    Expected End:  05/19/24               Pain          Pain - Adult

## 2024-05-06 NOTE — CARE PLAN
The patient's goals for the shift include      The clinical goals for the shift include pain control      Problem: Pain - Adult  Goal: Verbalizes/displays adequate comfort level or baseline comfort level  Outcome: Progressing     Problem: Safety - Adult  Goal: Free from fall injury  Outcome: Progressing     Problem: Discharge Planning  Goal: Discharge to home or other facility with appropriate resources  Outcome: Progressing     Problem: Chronic Conditions and Co-morbidities  Goal: Patient's chronic conditions and co-morbidity symptoms are monitored and maintained or improved  Outcome: Progressing     Problem: Fall/Injury  Goal: Not fall by end of shift  Outcome: Progressing  Goal: Be free from injury by end of the shift  Outcome: Progressing  Goal: Verbalize understanding of personal risk factors for fall in the hospital  Outcome: Progressing  Goal: Verbalize understanding of risk factor reduction measures to prevent injury from fall in the home  Outcome: Progressing  Goal: Pace activities to prevent fatigue by end of the shift  Outcome: Progressing     Problem: Skin  Goal: Decreased wound size/increased tissue granulation at next dressing change  Outcome: Progressing  Flowsheets (Taken 5/4/2024 2121 by Wendy Oconnell RN)  Decreased wound size/increased tissue granulation at next dressing change:   Promote sleep for wound healing   Protective dressings over bony prominences   Utilize specialty bed per algorithm  Goal: Participates in plan/prevention/treatment measures  Outcome: Progressing  Flowsheets (Taken 5/4/2024 2121 by Wendy Oconnell RN)  Participates in plan/prevention/treatment measures:   Discuss with provider PT/OT consult   Elevate heels   Increase activity/out of bed for meals  Goal: Prevent/manage excess moisture  Outcome: Progressing  Flowsheets (Taken 5/4/2024 2121 by Wendy Oconnell RN)  Prevent/manage excess moisture:   Monitor for/manage infection if present   Cleanse incontinence/protect with barrier  cream   Moisturize dry skin  Goal: Prevent/minimize sheer/friction injuries  Outcome: Progressing  Flowsheets (Taken 5/4/2024 2121 by Wendy Oconnell, RN)  Prevent/minimize sheer/friction injuries:   Complete micro-shifts as needed if patient unable. Adjust patient position to relieve pressure points, not a full turn   Increase activity/out of bed for meals   Use pull sheet   Turn/reposition every 2 hours/use positioning/transfer devices   Utilize specialty bed per algorithm  Goal: Promote/optimize nutrition  Outcome: Progressing  Flowsheets (Taken 5/4/2024 2121 by Wendy Oconnell, RN)  Promote/optimize nutrition:   Consume > 50% meals/supplements   Offer water/supplements/favorite foods   Monitor/record intake including meals  Goal: Promote skin healing  Outcome: Progressing  Flowsheets (Taken 5/4/2024 2121 by Wendy Oconnell, RN)  Promote skin healing:   Protective dressings over bony prominences   Assess skin/pad under line(s)/device(s)   Turn/reposition every 2 hours/use positioning/transfer devices     Problem: Pain  Goal: Takes deep breaths with improved pain control throughout the shift  Outcome: Progressing  Goal: Turns in bed with improved pain control throughout the shift  Outcome: Progressing  Goal: Walks with improved pain control throughout the shift  Outcome: Progressing  Goal: Performs ADL's with improved pain control throughout shift  Outcome: Progressing  Goal: Participates in PT with improved pain control throughout the shift  Outcome: Progressing  Goal: Free from opioid side effects throughout the shift  Outcome: Progressing  Goal: Free from acute confusion related to pain meds throughout the shift  Outcome: Progressing     Problem: Pain  Goal: STG - Patients pain is managed to allow active participation in daily activities  Outcome: Progressing

## 2024-05-06 NOTE — CONSULTS
INFECTIOUS DISEASES CONSULTATION NOTE      Referred by CATRACHITO Lobo    Reason For Consult  [place] ptosis    History Of Present Illness  Audelia Virgen is a 89 y.o. female who sustained a mechanical fall on 5/3 and developed chest pain.  She was seen in the [facility] emergency room where she was found to have a pneumothorax.  Chest tube was inserted and she was transferred here for admission.  She had mild leukocytosis which improved.  Today her WBC increased again and prompted a request for Infectious Disease consultation.  She states her right chest pain is improving.  She denies dyspnea cough or sputum production.  Denies nausea, vomiting, diarrhea, or dysuria.     Past Medical History  Atrial fibrillation, on anticoagulation  Diabetes mellitus  Hypertension  Remote hysterectomy and mastectomy  Social History  Tobacco use: Lifetime non-smoker  Alcohol use: Occasional social alcohol use    Family History  Not pertinent to the current question    Allergies  Patient has no known allergies.     Review of Systems  Detailed review of systems completed.  No significant additional positives beyond what is mentioned above    Physical Exam  Vital signs:  Visit Vitals  /65   Pulse 75   Temp 37.2 °C (99 °F) (Temporal)   Resp 18   Single recorded elevated temperature of 38.2 last evening  General: Elderly woman sitting upright in bed, in no acute distress  HEENT:  No scleral icterus or conjunctival suffusion, oral mucosa moist  Nodes:  Negative  Lungs:  Clear to auscultation, shallow.  Chest tube on the right, small amount of blood-tinged fluid  Breasts:  Not examined  Heart:  S1, S2 normal, no pathologic murmur appreciated  Abdomen:  Soft, nontender. No palpable organs or masses  Back:  No spinal or CVA tenderness  Genitalia: Morales catheter in place  Extremities:  No cords, phlebitis, cellulitis  Neurologic:  Alert.  Grossly non-focal.  No meningismus  Skin: No cellulitis or dermatitis    Relevant  Results  Results from last 72 hours   Lab Units 05/06/24  0433 05/05/24  0435   WBC AUTO x10*3/uL 14.8* 8.4   HEMOGLOBIN g/dL 11.3* 10.6*   HEMATOCRIT % 34.6* 32.4*   PLATELETS AUTO x10*3/uL 153 141*   NEUTROS PCT AUTO %  --  65.6   LYMPHO PCT MAN % 2.0  --    LYMPHS PCT AUTO %  --  19.6   MONO PCT MAN % 5.0  --    MONOS PCT AUTO %  --  12.2   EOSINO PCT MAN % 0.0  --    EOS PCT AUTO %  --  1.7     Results from last 72 hours   Lab Units 05/06/24  0433   CREATININE mg/dL 2.30*   ANION GAP mmol/L 14   EGFR mL/min/1.73m*2 19*     Results from last 72 hours   Lab Units 05/04/24  0848   AST U/L 23   ALT U/L 15   ALK PHOS U/L 61   BILIRUBIN TOTAL mg/dL 0.6     Urinalysis: Unremarkable  Microbiology:  Blood (5/6): Pending X2  Urine: Pending  Imaging:  CT chest images personally reviewed: Small residual right pneumothorax with some underlying lung contusion  CT chest/abdomen/pelvis radiologist report pending      ASSESSMENT:  Leukocytosis/transient fever  Patient was admitted with a traumatic pneumothorax and has had a chest tube placed.  She has an otherwise unremarkable physical examination.  She has developed some acute kidney injury.  My suspicion for infection or sepsis is low and I suspect that the leukocytosis and fever may be related to underlying lung contusion.  Do not feel empiric antibiotic therapy is indicated at this time    Acute kidney injury  Nephrology consulting    PLANS:  -   Trend WBC  -   Monitor progress without systemic antibiotics    Impressions discussed with daughter at bedside     THANK YOU FOR ASKING ME TO ASSIST YOU IN THE CARE OF YOUR PATIENT    Josr Davis MD  [facility]  Office:  [phone]

## 2024-05-06 NOTE — PROGRESS NOTES
"Audelia Virgen is a 89 y.o. female on day 2 of admission presenting with Pneumothorax.    Subjective   Worsened mentation today, very little UOP recorded yesterday and patient was off fluids all night.  Cr up to 2.3.  Seen by nephrology, placed CT chest, abdomen, pelvis.  Small hemo/pneumothorax on the right after chest tube placed to water seal.  Saturating 97% on 2L       Objective     Physical Exam  HENT:      Head: Normocephalic.   Cardiovascular:      Rate and Rhythm: Normal rate.   Pulmonary:      Effort: Pulmonary effort is normal.   Abdominal:      Comments: Soft, mild distention.  TTP in lower abdomen.     Neurological:      Mental Status: She is alert.         Last Recorded Vitals  Blood pressure 114/65, pulse 66, temperature 37.2 °C (99 °F), temperature source Temporal, resp. rate (!) 39, height 1.626 m (5' 4.02\"), weight 59.2 kg (130 lb 8.2 oz), SpO2 97%.  Intake/Output last 3 Shifts:  I/O last 3 completed shifts:  In: 350 (5.9 mL/kg) [P.O.:350]  Out: 550 (9.3 mL/kg) [Urine:450 (0.2 mL/kg/hr); Chest Tube:100]  Weight: 59.3 kg     Relevant Results              Lab Results   Component Value Date    WBC 14.8 (H) 05/06/2024    HGB 11.3 (L) 05/06/2024    HCT 34.6 (L) 05/06/2024    MCV 98 05/06/2024     05/06/2024     Lab Results   Component Value Date    GLUCOSE 107 (H) 05/06/2024    CALCIUM 8.7 05/06/2024     05/06/2024    K 4.6 05/06/2024    CO2 25 05/06/2024     05/06/2024    BUN 65 (H) 05/06/2024    CREATININE 2.30 (H) 05/06/2024     === 05/04/24 ===    CT CHEST ABDOMEN PELVIS WO CONTRAST    - Impression -  Chest  1.  Interval placement of a pigtail thoracostomy tube on the right  with overall decreased size of right hemopneumothorax. The  pneumothorax is smaller whereas the hemothorax is a little larger.  Subcutaneous emphysema on the right persists.  2. Ground-glass opacity within the anterior segment of right upper  lobe with associated airspace consolidation centrally most likely " due  to pulmonary parenchymal contusion and hemorrhage.  3. Acute displaced fractures of the right 7th through 9th ribs.  4. Airspace consolidation in medial left lower lobe in a paraspinous  location with trace left pleural effusion. Compressive atelectasis in  the right lower lobe has increased since prior study.    Abdomen-Pelvis  1.  Cholelithiasis.  2. Diverticulosis of the colon.  3. Decompressed urinary bladder with Morales catheter satisfactorily  positioned.        MACRO:  None    Signed by: Mariana Jones 5/6/2024 12:29 PM  Dictation workstation:   [id number]      This patient has a urinary catheter   Reason for the urinary catheter remaining today? critically ill patient who need accurate urinary output measurements               Assessment/Plan   Principal Problem:    Pneumothorax    89 yo woman presenting with fall and right sided pneumothorax with multiple rib fractures.    - Chest tube to -20 mmHg   - Limit narcotics   - PT/OT  - Appreciate nephrology input given her MARIA DEL ROSARIO  - DVT ppx       I spent 30 minutes in the professional and overall care of this patient.      Matt Louie MD

## 2024-05-07 ENCOUNTER — APPOINTMENT (OUTPATIENT)
Dept: CARDIOLOGY | Facility: HOSPITAL | Age: 89
DRG: 200 | End: 2024-05-07
Payer: MEDICARE

## 2024-05-07 LAB
ALBUMIN SERPL-MCNC: 2.9 G/DL (ref 3.5–5)
ANION GAP SERPL CALC-SCNC: 14 MMOL/L
BACTERIA UR CULT: NORMAL
BASOPHILS # BLD MANUAL: 0 X10*3/UL (ref 0–0.1)
BASOPHILS NFR BLD MANUAL: 0 %
BUN SERPL-MCNC: 78 MG/DL (ref 8–25)
CALCIUM SERPL-MCNC: 8.4 MG/DL (ref 8.5–10.4)
CHLORIDE SERPL-SCNC: 101 MMOL/L (ref 97–107)
CO2 SERPL-SCNC: 22 MMOL/L (ref 24–31)
CREAT SERPL-MCNC: 1.8 MG/DL (ref 0.4–1.6)
EGFRCR SERPLBLD CKD-EPI 2021: 26 ML/MIN/1.73M*2
EOSINOPHIL # BLD MANUAL: 0 X10*3/UL (ref 0–0.4)
EOSINOPHIL NFR BLD MANUAL: 0 %
ERYTHROCYTE [DISTWIDTH] IN BLOOD BY AUTOMATED COUNT: 14.7 % (ref 11.5–14.5)
GLUCOSE BLD MANUAL STRIP-MCNC: 72 MG/DL (ref 74–99)
GLUCOSE BLD MANUAL STRIP-MCNC: 72 MG/DL (ref 74–99)
GLUCOSE BLD MANUAL STRIP-MCNC: 92 MG/DL (ref 74–99)
GLUCOSE SERPL-MCNC: 75 MG/DL (ref 65–99)
HCT VFR BLD AUTO: 30.6 % (ref 36–46)
HGB BLD-MCNC: 10.5 G/DL (ref 12–16)
IMM GRANULOCYTES # BLD AUTO: 0.13 X10*3/UL (ref 0–0.5)
IMM GRANULOCYTES NFR BLD AUTO: 1.3 % (ref 0–0.9)
INR PPP: 1.4 (ref 0.9–1.2)
LYMPHOCYTES # BLD MANUAL: 1.02 X10*3/UL (ref 0.8–3)
LYMPHOCYTES NFR BLD MANUAL: 10 %
MCH RBC QN AUTO: 31.5 PG (ref 26–34)
MCHC RBC AUTO-ENTMCNC: 34.3 G/DL (ref 32–36)
MCV RBC AUTO: 92 FL (ref 80–100)
METAMYELOCYTES # BLD MANUAL: 0.51 X10*3/UL
METAMYELOCYTES NFR BLD MANUAL: 5 %
MONOCYTES # BLD MANUAL: 0.31 X10*3/UL (ref 0.05–0.8)
MONOCYTES NFR BLD MANUAL: 3 %
NEUTROPHILS # BLD MANUAL: 8.37 X10*3/UL (ref 1.6–5.5)
NEUTS BAND # BLD MANUAL: 2.45 X10*3/UL (ref 0–0.5)
NEUTS BAND NFR BLD MANUAL: 24 %
NEUTS SEG # BLD MANUAL: 5.92 X10*3/UL (ref 1.6–5)
NEUTS SEG NFR BLD MANUAL: 58 %
NRBC BLD-RTO: 0 /100 WBCS (ref 0–0)
PHOSPHATE SERPL-MCNC: 4.5 MG/DL (ref 2.5–4.5)
PLATELET # BLD AUTO: 129 X10*3/UL (ref 150–450)
POTASSIUM SERPL-SCNC: 4.1 MMOL/L (ref 3.4–5.1)
PROTHROMBIN TIME: 14.3 SECONDS (ref 9.3–12.7)
RBC # BLD AUTO: 3.33 X10*6/UL (ref 4–5.2)
RBC MORPH BLD: ABNORMAL
SODIUM SERPL-SCNC: 137 MMOL/L (ref 133–145)
TOTAL CELLS COUNTED BLD: 100
WBC # BLD AUTO: 10.2 X10*3/UL (ref 4.4–11.3)

## 2024-05-07 PROCEDURE — 36415 COLL VENOUS BLD VENIPUNCTURE: CPT | Performed by: HOSPITALIST

## 2024-05-07 PROCEDURE — 82947 ASSAY GLUCOSE BLOOD QUANT: CPT | Mod: 91

## 2024-05-07 PROCEDURE — 97535 SELF CARE MNGMENT TRAINING: CPT | Mod: GO

## 2024-05-07 PROCEDURE — 82947 ASSAY GLUCOSE BLOOD QUANT: CPT

## 2024-05-07 PROCEDURE — 85007 BL SMEAR W/DIFF WBC COUNT: CPT

## 2024-05-07 PROCEDURE — 2500000004 HC RX 250 GENERAL PHARMACY W/ HCPCS (ALT 636 FOR OP/ED)

## 2024-05-07 PROCEDURE — 97110 THERAPEUTIC EXERCISES: CPT | Mod: GP

## 2024-05-07 PROCEDURE — 85027 COMPLETE CBC AUTOMATED: CPT

## 2024-05-07 PROCEDURE — 2500000006 HC RX 250 W HCPCS SELF ADMINISTERED DRUGS (ALT 637 FOR ALL PAYERS)

## 2024-05-07 PROCEDURE — 93005 ELECTROCARDIOGRAM TRACING: CPT

## 2024-05-07 PROCEDURE — 85610 PROTHROMBIN TIME: CPT

## 2024-05-07 PROCEDURE — 2500000004 HC RX 250 GENERAL PHARMACY W/ HCPCS (ALT 636 FOR OP/ED): Performed by: INTERNAL MEDICINE

## 2024-05-07 PROCEDURE — 80069 RENAL FUNCTION PANEL: CPT | Performed by: HOSPITALIST

## 2024-05-07 PROCEDURE — 97530 THERAPEUTIC ACTIVITIES: CPT | Mod: GO

## 2024-05-07 PROCEDURE — 97530 THERAPEUTIC ACTIVITIES: CPT | Mod: GP

## 2024-05-07 PROCEDURE — 2500000005 HC RX 250 GENERAL PHARMACY W/O HCPCS

## 2024-05-07 PROCEDURE — 2500000001 HC RX 250 WO HCPCS SELF ADMINISTERED DRUGS (ALT 637 FOR MEDICARE OP): Performed by: STUDENT IN AN ORGANIZED HEALTH CARE EDUCATION/TRAINING PROGRAM

## 2024-05-07 PROCEDURE — 2500000001 HC RX 250 WO HCPCS SELF ADMINISTERED DRUGS (ALT 637 FOR MEDICARE OP)

## 2024-05-07 PROCEDURE — 2060000001 HC INTERMEDIATE ICU ROOM DAILY

## 2024-05-07 RX ORDER — WARFARIN SODIUM 5 MG/1
7.5 TABLET ORAL DAILY
Status: DISCONTINUED | OUTPATIENT
Start: 2024-05-07 | End: 2024-05-08

## 2024-05-07 RX ADMIN — TRAMADOL HYDROCHLORIDE 50 MG: 50 TABLET ORAL at 05:09

## 2024-05-07 RX ADMIN — ONDANSETRON HYDROCHLORIDE 4 MG: 2 INJECTION INTRAMUSCULAR; INTRAVENOUS at 09:20

## 2024-05-07 RX ADMIN — METOPROLOL TARTRATE 12.5 MG: 25 TABLET, FILM COATED ORAL at 09:15

## 2024-05-07 RX ADMIN — ALLOPURINOL 100 MG: 100 TABLET ORAL at 21:15

## 2024-05-07 RX ADMIN — SODIUM CHLORIDE 100 ML/HR: 900 INJECTION, SOLUTION INTRAVENOUS at 05:09

## 2024-05-07 RX ADMIN — METOPROLOL TARTRATE 12.5 MG: 25 TABLET, FILM COATED ORAL at 21:19

## 2024-05-07 RX ADMIN — SODIUM CHLORIDE 75 ML/HR: 900 INJECTION, SOLUTION INTRAVENOUS at 23:17

## 2024-05-07 RX ADMIN — FAMOTIDINE 20 MG: 20 TABLET, FILM COATED ORAL at 09:15

## 2024-05-07 RX ADMIN — WARFARIN SODIUM 7.5 MG: 5 TABLET ORAL at 18:08

## 2024-05-07 RX ADMIN — ENOXAPARIN SODIUM 30 MG: 30 INJECTION SUBCUTANEOUS at 09:15

## 2024-05-07 RX ADMIN — LIDOCAINE 1 PATCH: 4 PATCH TOPICAL at 09:15

## 2024-05-07 ASSESSMENT — COGNITIVE AND FUNCTIONAL STATUS - GENERAL
PERSONAL GROOMING: A LITTLE
WALKING IN HOSPITAL ROOM: A LOT
DRESSING REGULAR UPPER BODY CLOTHING: A LOT
MOBILITY SCORE: 12
STANDING UP FROM CHAIR USING ARMS: A LOT
CLIMB 3 TO 5 STEPS WITH RAILING: TOTAL
MOVING FROM LYING ON BACK TO SITTING ON SIDE OF FLAT BED WITH BEDRAILS: A LITTLE
HELP NEEDED FOR BATHING: TOTAL
EATING MEALS: A LITTLE
MOVING TO AND FROM BED TO CHAIR: A LOT
DAILY ACTIVITIY SCORE: 14
TURNING FROM BACK TO SIDE WHILE IN FLAT BAD: A LOT
DRESSING REGULAR UPPER BODY CLOTHING: A LOT
TOILETING: TOTAL
STANDING UP FROM CHAIR USING ARMS: A LOT
MOVING TO AND FROM BED TO CHAIR: A LOT
WALKING IN HOSPITAL ROOM: A LOT
HELP NEEDED FOR BATHING: A LOT
DRESSING REGULAR LOWER BODY CLOTHING: TOTAL
DAILY ACTIVITIY SCORE: 13
TURNING FROM BACK TO SIDE WHILE IN FLAT BAD: A LOT
MOBILITY SCORE: 13
MOVING FROM LYING ON BACK TO SITTING ON SIDE OF FLAT BED WITH BEDRAILS: A LITTLE
CLIMB 3 TO 5 STEPS WITH RAILING: A LOT
DRESSING REGULAR LOWER BODY CLOTHING: A LOT
TOILETING: A LOT

## 2024-05-07 ASSESSMENT — PAIN - FUNCTIONAL ASSESSMENT
PAIN_FUNCTIONAL_ASSESSMENT: 0-10

## 2024-05-07 ASSESSMENT — PAIN SCALES - GENERAL
PAINLEVEL_OUTOF10: 0 - NO PAIN
PAINLEVEL_OUTOF10: 8
PAINLEVEL_OUTOF10: 0 - NO PAIN
PAINLEVEL_OUTOF10: 3
PAINLEVEL_OUTOF10: 0 - NO PAIN

## 2024-05-07 ASSESSMENT — PAIN DESCRIPTION - LOCATION: LOCATION: LEG

## 2024-05-07 ASSESSMENT — ACTIVITIES OF DAILY LIVING (ADL): HOME_MANAGEMENT_TIME_ENTRY: 20

## 2024-05-07 ASSESSMENT — PAIN DESCRIPTION - ORIENTATION: ORIENTATION: RIGHT;LEFT

## 2024-05-07 NOTE — PROGRESS NOTES
"Audelia Virgen is a 89 y.o. female on day 3 of admission presenting with Pneumothorax.      Subjective   Patient seen sleeping in bed, female relative at bedside.  Relative relates frustration that the patient is \"getting weaker\". Patient with improved electrolytes from yesterday.        Objective     Last Recorded Vitals  /72   Pulse 64   Temp 36.4 °C (97.5 °F)   Resp 15   Wt 61.8 kg (136 lb 3.9 oz)   SpO2 96%   Intake/Output last 3 Shifts:    Intake/Output Summary (Last 24 hours) at 5/7/2024 1452  Last data filed at 5/7/2024 0800  Gross per 24 hour   Intake 526.67 ml   Output 530 ml   Net -3.33 ml       Admission Weight  Weight: 58.7 kg (129 lb 6.6 oz) (05/04/24 0751)    Daily Weight  05/07/24 : 61.8 kg (136 lb 3.9 oz)    Image Results  CT chest abdomen pelvis wo IV contrast  Narrative: Interpreted By:  Mariana Jones,   STUDY:  CT CHEST ABDOMEN PELVIS WO CONTRAST;  5/6/2024 10:06 am      INDICATION:  Signs/Symptoms:s/p rib fractures with pneumothorax.      COMPARISON:  CT chest done on 05/04/2024      ACCESSION NUMBER(S):  [id number]      ORDERING CLINICIAN:  RUCHI MENDOZA      TECHNIQUE:  CT of the chest, abdomen and pelvis was performed. Contiguous axial  images were obtained at 3 mm slice thickness through the chest,  abdomen and pelvis. Coronal and sagittal reconstructions at 3 mm  slice thickness were performed.  No intravenous contrast was  administered; positive oral contrast was given.          FINDINGS:  Please note that the study is limited without intravenous contrast.      CHEST:          LUNG/PLEURA/LARGE AIRWAYS:  A right-sided pigtail thoracostomy tube is satisfactorily positioned  within the anterior pleural space with small right hemopneumothorax  estimated to comprise 10% of the volume of the right hemithorax. The  right hemopneumothorax has decreased in size since the prior study.  There is a 5 cm area of ground-glass density within the anterior  segment of right upper lobe with " central airspace consolidation  measuring 2.2 cm in size. This is most likely related to pulmonary  contusion and hemorrhage within the anterior segment of right upper  lobe. There are stable tiny juxtapleural nodules along the right  minor fissure. Compressive atelectasis is present posteriorly in the  right lower lobe and has increased since the prior examination. There  is also some airspace consolidation seen medially in the left lower  lobe in a paraspinous location. There is a trace amount of left  pleural effusion.      HEART:  Cardiomegaly is observed with pacemaker lead identified within region  of the interventricular septum. Coronary artery calcification is  present.      MEDIASTINUM AND SHALA:  No mediastinal or hilar lymphadenopathy or mass is seen. No  aneurysmal dilatation of the thoracic aorta is seen.      CHEST WALL AND LOWER NECK:  There is a small amount of subcutaneous emphysema within the right  chest wall appearing similar when compared with prior study. There  are displaced acute fractures of the right 7th, 8th, and 9th ribs  seen.      ABDOMEN:      LIVER:  Unremarkable      BILE DUCTS:  No intrahepatic or extrahepatic biliary ductal dilatation is seen.      GALLBLADDER:  Cholelithiasis is present without evidence of cholecystitis.      PANCREAS:  The pancreas is atrophic.      SPLEEN:  Normal. Incidental note is made of splenic artery calcification.      ADRENAL GLANDS:  No adrenal abnormality is seen.      KIDNEYS AND URETERS:  Unremarkable bilaterally.      PELVIS:      BLADDER:  Morales catheter is satisfactorily positioned within decompressed  urinary bladder.      REPRODUCTIVE ORGANS:  No pelvic mass is seen.      BOWEL:  There are some colonic diverticula observed without evidence for  diverticulitis. There is no abnormal bowel wall thickening  identified. No abnormal distention of the intestinal tract is seen.      VESSELS:  There is atherosclerosis of the abdominal aorta and iliac  arteries  without aneurysmal dilatation.      PERITONEUM/RETROPERITONEUM/LYMPH NODES:  No free fluid or free intraperitoneal air is observed. There are  scattered normal-sized mesenteric lymph nodes identified.      ABDOMINAL WALL:  There is no hematoma of the abdominal wall musculature. No hernia is  identified.      BONES:  There is mild lumbar levoscoliosis and mild thoracic dextroscoliosis.  A grade 1 spondylolisthesis of L4 on L5 is noted. No fracture of the  thoracic, lumbar, or sacral spines is seen. No pelvic fracture is  observed.      Impression: Chest  1.  Interval placement of a pigtail thoracostomy tube on the right  with overall decreased size of right hemopneumothorax. The  pneumothorax is smaller whereas the hemothorax is a little larger.  Subcutaneous emphysema on the right persists.  2. Ground-glass opacity within the anterior segment of right upper  lobe with associated airspace consolidation centrally most likely due  to pulmonary parenchymal contusion and hemorrhage.  3. Acute displaced fractures of the right 7th through 9th ribs.  4. Airspace consolidation in medial left lower lobe in a paraspinous  location with trace left pleural effusion. Compressive atelectasis in  the right lower lobe has increased since prior study.      Abdomen-Pelvis  1.  Cholelithiasis.  2. Diverticulosis of the colon.  3. Decompressed urinary bladder with Morales catheter satisfactorily  positioned.              MACRO:  None      Signed by: Mariana Jones 5/6/2024 12:29 PM  Dictation workstation:   [id number]  XR chest 2 views  Narrative: Interpreted By:  Mariana Jones,   STUDY:  XR CHEST 2 VIEWS 5/6/2024 10:21 am      INDICATION:  Signs/Symptoms:Pneumothorax      COMPARISON:  05/05/2024      ACCESSION NUMBER(S):  [id number]      ORDERING CLINICIAN:  RUCHI MENDOZA      TECHNIQUE:  AP erect and lateral views of the chest      FINDINGS:  The right sided pigtail thoracostomy tube is re-identified. There is  right small  hemopneumothorax with right apical pneumothorax estimated  to comprise approximately 5% of the volume of the right hemithorax.      There are acute displaced right rib fractures seen.      There is stable enlargement of cardiac silhouette with unipolar  pacemaker lead in right ventricle. There are surgical clips in the  left axilla with postoperative change from left mastectomy.      There is some discoid atelectasis at the right lung base      Impression: Right hemopneumothorax with right apical pneumothorax estimated  comprise approximately 5% of the volume of right hemithorax. Right  basilar discoid atelectasis.      Signed by: Mariana Jones 5/6/2024 10:51 AM  Dictation workstation:   [id number]      Physical Exam  Vitals and nursing note reviewed.   Constitutional:       Appearance: Normal appearance.      Comments: Ill-appearing   HENT:      Head: Normocephalic.      Mouth/Throat:      Mouth: Mucous membranes are moist.   Eyes:      Extraocular Movements: Extraocular movements intact.      Pupils: Pupils are equal, round, and reactive to light.   Cardiovascular:      Rate and Rhythm: Normal rate.      Pulses: Normal pulses.      Heart sounds: Normal heart sounds.   Pulmonary:      Breath sounds: Normal breath sounds.      Comments: 2 L of oxygen via nasal cannula  Abdominal:      General: There is distension.   Musculoskeletal:         General: Normal range of motion.      Cervical back: Normal range of motion.   Skin:     General: Skin is warm and dry.      Capillary Refill: Capillary refill takes less than 2 seconds.   Neurological:      General: No focal deficit present.      Mental Status: She is alert.      Comments: Slightly lethargic   Psychiatric:         Mood and Affect: Mood normal.         Relevant Results             Results for orders placed or performed during the hospital encounter of 05/04/24 (from the past 24 hour(s))   POCT GLUCOSE   Result Value Ref Range    POCT Glucose 88 74 - 99 mg/dL    POCT GLUCOSE   Result Value Ref Range    POCT Glucose 68 (L) 74 - 99 mg/dL   POCT GLUCOSE   Result Value Ref Range    POCT Glucose 106 (H) 74 - 99 mg/dL   CBC and Auto Differential   Result Value Ref Range    WBC 10.2 4.4 - 11.3 x10*3/uL    nRBC 0.0 0.0 - 0.0 /100 WBCs    RBC 3.33 (L) 4.00 - 5.20 x10*6/uL    Hemoglobin 10.5 (L) 12.0 - 16.0 g/dL    Hematocrit 30.6 (L) 36.0 - 46.0 %    MCV 92 80 - 100 fL    MCH 31.5 26.0 - 34.0 pg    MCHC 34.3 32.0 - 36.0 g/dL    RDW 14.7 (H) 11.5 - 14.5 %    Platelets 129 (L) 150 - 450 x10*3/uL    Immature Granulocytes %, Automated 1.3 (H) 0.0 - 0.9 %    Immature Granulocytes Absolute, Automated 0.13 0.00 - 0.50 x10*3/uL   Renal Function Panel   Result Value Ref Range    Glucose 75 65 - 99 mg/dL    Sodium 137 133 - 145 mmol/L    Potassium 4.1 3.4 - 5.1 mmol/L    Chloride 101 97 - 107 mmol/L    Bicarbonate 22 (L) 24 - 31 mmol/L    Urea Nitrogen 78 (H) 8 - 25 mg/dL    Creatinine 1.80 (H) 0.40 - 1.60 mg/dL    eGFR 26 (L) >60 mL/min/1.73m*2    Calcium 8.4 (L) 8.5 - 10.4 mg/dL    Phosphorus 4.5 2.5 - 4.5 mg/dL    Albumin 2.9 (L) 3.5 - 5.0 g/dL    Anion Gap 14 <=19 mmol/L   Manual Differential   Result Value Ref Range    Neutrophils %, Manual 58.0 40.0 - 80.0 %    Bands %, Manual 24.0 0.0 - 5.0 %    Lymphocytes %, Manual 10.0 13.0 - 44.0 %    Monocytes %, Manual 3.0 2.0 - 10.0 %    Eosinophils %, Manual 0.0 0.0 - 6.0 %    Basophils %, Manual 0.0 0.0 - 2.0 %    Metamyelocytes %, Manual 5.0 0.0 - 0.0 %    Seg Neutrophils Absolute, Manual 5.92 (H) 1.60 - 5.00 x10*3/uL    Bands Absolute, Manual 2.45 (H) 0.00 - 0.50 x10*3/uL    Lymphocytes Absolute, Manual 1.02 0.80 - 3.00 x10*3/uL    Monocytes Absolute, Manual 0.31 0.05 - 0.80 x10*3/uL    Eosinophils Absolute, Manual 0.00 0.00 - 0.40 x10*3/uL    Basophils Absolute, Manual 0.00 0.00 - 0.10 x10*3/uL    Metamyelocytes Absolute, Manual 0.51 0.00 - 0.00 x10*3/uL    Total Cells Counted 100     Neutrophils Absolute, Manual 8.37 (H) 1.60 - 5.50  x10*3/uL    RBC Morphology No significant RBC morphology present    Protime-INR   Result Value Ref Range    Protime 14.3 (H) 9.3 - 12.7 seconds    INR 1.4 (H) 0.9 - 1.2   POCT GLUCOSE   Result Value Ref Range    POCT Glucose 92 74 - 99 mg/dL    XR chest 1 view    Result Date: 5/5/2024  Interpreted By:  Santos Faria, STUDY: XR CHEST 1 VIEW  5/5/2024 1:41 pm   INDICATION: Signs/Symptoms:Chest pain   COMPARISON: 05/05/2024 at 5:55 a.m.   ACCESSION NUMBER(S): [id number]   ORDERING CLINICIAN: FLAQUITA LEE   TECHNIQUE: A single AP portable radiograph of the chest was obtained.   FINDINGS: Multiple cardiac monitoring leads are seen over the chest.  A single lead pacer is seen over the right chest. The right-sided chest tube is unchanged in position. Bilateral basilar airspace consolidations are seen and may represent small pleural effusions, atelectasis and/or pneumonia. No pneumothorax is identified. The cardiac silhouette is enlarged, though not significantly changed.       No significant interval change in the appearance of the chest, with findings as above.   MACRO: None.   Signed by: Santos Faria 5/5/2024 1:43 PM Dictation workstation:   [id number]    XR chest 1 view    Result Date: 5/5/2024  Interpreted By:  Genna Lara, STUDY: XR CHEST 1 VIEW 5/5/2024 6:14 am   INDICATION: Signs/Symptoms:eval pneumothorax   COMPARISON: 05/04/2024   ACCESSION NUMBER(S): [id number]   ORDERING CLINICIAN: SHANNON HERNÁNDEZ   TECHNIQUE: AP view   FINDINGS: Pacemaker overlies the right chest wall with a single transvenous lead overlying the right ventricle. Pigtail catheter seen in the right upper chest unchanged since the prior exam. Lungs are hyperinflated typical of COPD there are suspected small bilateral pleural effusions. Slight pulmonary venous congestion noted with an enlarged cardiac silhouette. Bilateral lower lobe airspace opacification may be due to atelectasis or infiltrates. Surgical clips are seen in the left axilla.        Signs of CHF, COPD, small bilateral pleural effusions and bilateral lower lobe atelectasis or infiltrates unchanged since the prior exam   Signed by: Genna Lara 5/5/2024 12:58 PM Dictation workstation:   [id number]    XR chest 1 view    Result Date: 5/4/2024  Interpreted By:  Finkelstein, Evan, STUDY: XR CHEST 1 VIEW;  5/4/2024 4:24 am   INDICATION: Signs/Symptoms:Status post chest tube insertion.   COMPARISON: Chest radiograph 05/04/2024   ACCESSION NUMBER(S): [id number]   ORDERING CLINICIAN: SHANDRA MARTINEZ   FINDINGS: Right chest wall pacemaker present. Interval placement of a right-sided pigtail catheter   CARDIOMEDIASTINAL SILHOUETTE: Enlarged cardiac silhouette, similar compared to prior imaging. Calcifications overlie the aortic arch.   LUNGS: There are patchy bibasilar airspace opacities. Persistent small right pneumothorax.   ABDOMEN: No remarkable upper abdominal findings.   BONES: Mildly displaced fractures of the right 7th and 8th ribs.       Interval right pigtail catheter placement. Persistent small right pneumothorax. Bibasilar airspace opacities, which may represent areas of atelectasis with pulmonary contusions not excluded in the setting of trauma. Mildly displaced fractures of the right 7th and 8th ribs.   MACRO: None.   Signed by: Evan Finkelstein 5/4/2024 4:52 AM Dictation workstation:   [id number]    CT chest wo IV contrast    Result Date: 5/4/2024  Interpreted By:  Rigoberto Varma, STUDY: CT CHEST WO IV CONTRAST;  5/4/2024 3:12 am   INDICATION: Signs/Symptoms:Trauma.   COMPARISON: None.   ACCESSION NUMBER(S): [id number]   ORDERING CLINICIAN: ALLYN HUMPHREY   TECHNIQUE: Helical data acquisition of the chest was obtained  without IV contrast material.  Images were reformatted in axial, coronal, and sagittal planes.   FINDINGS: LUNGS AND AIRWAYS: The trachea and central airways are patent. No endobronchial lesion.   There is a small to moderate-sized right pneumothorax the occupies  probably between 10 and 20 % of the volume of the right pleural cavity. Trace right hemothorax. No pleural effusion or pneumothorax on the left.   There is mild asymmetric ground-glass opacity in the right lower lobe with mild subpleural consolidative opacity, which probably relates to atelectasis, possibly with a component of pulmonary contusion. Platelike and reticular opacity in the left lower lung probably relates to atelectasis or scarring. No focal consolidation.   There is a background of pulmonary interstitial prominence with mild smooth interlobular septal thickening in the could relate to chronic changes with or without acute pulmonary interstitial edema/CHF.   MEDIASTINUM AND SHALA, LOWER NECK AND AXILLA: The visualized thyroid gland is within normal limits.   No pathologically enlarged lymph nodes.   Fluid in the esophagus is compatible with gastroesophageal reflux. Circumferential mural thickening of the distal esophagus with submucosal edema compatible with esophagitis; underlying neoplasm is not excluded. Recommend endoscopic correlation or further evaluation nonemergent outpatient fluoroscopic barium swallow exam. A yellow alert was sent.   HEART AND VESSELS: The thoracic aorta is of caliber and tortuous with moderate vascular calcifications.   Main pulmonary artery and its branches are normal in caliber.   Coronary artery calcifications are seen. The study is not optimized for evaluation of coronary arteries.   Heart is markedly enlarged. Pacing leads in the right atrium and right ventricle.   No evidence of pericardial effusion.   UPPER ABDOMEN: The visualized subdiaphragmatic structures demonstrate no remarkable findings.   CHEST WALL AND OSSEOUS STRUCTURES: Acute displaced fractures of the lateral right 7th through 9th ribs. Right lateral lower chest wall subcutaneous emphysema that extends inferiorly beyond the extent of imaging. Right upper anterior chest wall pacing generator.       Acute  displaced fractures of the lateral right 7th through 9th ribs. Right lateral lower chest wall subcutaneous emphysema that extends inferiorly beyond the extent of imaging.   There is a small to moderate-sized right pneumothorax the occupies probably between 10 and 20 % of the volume of the right pleural cavity. Trace right hemothorax. No pleural effusion or pneumothorax on the left.   There is mild asymmetric ground-glass opacity in the right lower lobe with mild subpleural consolidative opacity, which probably relates to atelectasis, possibly with a component of pulmonary contusion. Platelike and reticular opacity in the left lower lung probably relates to atelectasis or scarring. No focal consolidation.   There is a background of pulmonary interstitial prominence with mild smooth interlobular septal thickening in the could relate to chronic changes with or without acute pulmonary interstitial edema/CHF. Atherosclerosis, including coronary artery disease.   Marked cardiomegaly.   Circumferential mural thickening of the distal esophagus with submucosal edema compatible with esophagitis; underlying neoplasm is not excluded. Recommend endoscopic correlation or further evaluation nonemergent outpatient fluoroscopic barium swallow exam. A yellow alert was sent.   MACRO: Critical Finding:  See findings. Notification was initiated on 5/4/2024 at 3:33 am by  Rigoberto Varma.  (**-YCF-**) Instructions:   Signed by: Rigoberto Varma 5/4/2024 3:33 AM Dictation workstation:   [id number]    CT head wo IV contrast    Result Date: 5/4/2024  Interpreted By:  Rigoberto Varma, STUDY: CT HEAD WO IV CONTRAST; CT CERVICAL SPINE WO IV CONTRAST; ;  5/4/2024 3:11 am   INDICATION: Signs/Symptoms:Trauma.   COMPARISON: None.   ACCESSION NUMBER(S): [id number]; [id number]   ORDERING CLINICIAN: SHANDRA MARTINEZ   TECHNIQUE: Noncontrast CT exams of the head and cervical spine with multiplanar reformations.   FINDINGS:   BRAIN PARENCHYMA: Advanced volume  loss.  There is periventricular and subcortical white matter hypoattenuation, most in keeping with chronic microvascular ischemic change.  Gray-white matter interfaces are preserved. No mass, mass effect or midline shift.   HEMORRHAGE: No acute intracranial hemorrhage. VENTRICLES and EXTRA-AXIAL SPACES: Normal size. EXTRACRANIAL SOFT TISSUES: Within normal limits. PARANASAL SINUSES/MASTOIDS: The visualized paranasal sinuses and mastoid air cells are aerated. Mucous retention cyst in the left maxillary sinus. CALVARIUM: No depressed skull fracture. No destructive osseous lesion.   OTHER FINDINGS: None.   CERVICAL SPINE:   ALIGNMENT: Grade 1 degenerative anterolisthesis at C7-T1 and T1-T2. Alignment is otherwise normal. VERTEBRAE: No acute fracture. Vertebral stature is maintained. Endplate osteophytosis and uncovertebral hypertrophy in conjunction with moderate to severe disc height loss, worst at C6-C7. Moderate to severe multilevel hypertrophic facet osteoarthropathy. SPINAL CANAL: No critical spinal canal stenosis. PREVERTEBRAL SOFT TISSUES: No prevertebral soft tissue swelling. LUNG APICES: Partially imaged right pneumothorax.   OTHER FINDINGS: None.       No evidence of acute intracranial abnormality.   No acute cervical spine fracture or malalignment.   Partially imaged right pneumothorax.     MACRO: None   Signed by: Rigoberto Varma 5/4/2024 3:24 AM Dictation workstation:   [id number]    CT cervical spine wo IV contrast    Result Date: 5/4/2024  Interpreted By:  Rigoberto Varma, STUDY: CT HEAD WO IV CONTRAST; CT CERVICAL SPINE WO IV CONTRAST; ;  5/4/2024 3:11 am   INDICATION: Signs/Symptoms:Trauma.   COMPARISON: None.   ACCESSION NUMBER(S): [id number]; [id number]   ORDERING CLINICIAN: SHANDRA MARTINEZ   TECHNIQUE: Noncontrast CT exams of the head and cervical spine with multiplanar reformations.   FINDINGS:   BRAIN PARENCHYMA: Advanced volume loss.  There is periventricular and subcortical white matter  hypoattenuation, most in keeping with chronic microvascular ischemic change.  Gray-white matter interfaces are preserved. No mass, mass effect or midline shift.   HEMORRHAGE: No acute intracranial hemorrhage. VENTRICLES and EXTRA-AXIAL SPACES: Normal size. EXTRACRANIAL SOFT TISSUES: Within normal limits. PARANASAL SINUSES/MASTOIDS: The visualized paranasal sinuses and mastoid air cells are aerated. Mucous retention cyst in the left maxillary sinus. CALVARIUM: No depressed skull fracture. No destructive osseous lesion.   OTHER FINDINGS: None.   CERVICAL SPINE:   ALIGNMENT: Grade 1 degenerative anterolisthesis at C7-T1 and T1-T2. Alignment is otherwise normal. VERTEBRAE: No acute fracture. Vertebral stature is maintained. Endplate osteophytosis and uncovertebral hypertrophy in conjunction with moderate to severe disc height loss, worst at C6-C7. Moderate to severe multilevel hypertrophic facet osteoarthropathy. SPINAL CANAL: No critical spinal canal stenosis. PREVERTEBRAL SOFT TISSUES: No prevertebral soft tissue swelling. LUNG APICES: Partially imaged right pneumothorax.   OTHER FINDINGS: None.       No evidence of acute intracranial abnormality.   No acute cervical spine fracture or malalignment.   Partially imaged right pneumothorax.     MACRO: None   Signed by: Rigoberto Varma 5/4/2024 3:24 AM Dictation workstation:   [id number]    XR ribs right 2 views w chest pa or ap    Result Date: 5/4/2024  Interpreted By:  Polly Weinberg, STUDY: XR RIBS RIGHT 2 VIEWS WITH CHEST PA OR AP;  5/4/2024 2:31 am   INDICATION: Signs/Symptoms:Trauma.   COMPARISON: None.   ACCESSION NUMBER(S): [id number]   ORDERING CLINICIAN: SHANDRA MARTINEZ   FINDINGS: Right-sided single lead pacer is present with lead overlying the right ventricle. Multiple surgical clips overlie the left axilla   CARDIOMEDIASTINAL SILHOUETTE: Cardiac silhouette is enlarged. Mild interstitial prominence. Atherosclerotic calcification of the aorta.   LUNGS: There is a  subtle linear density along the right lateral chest wall adjacent to site of fractures described above. Tiny right-sided pneumothorax can not be excluded. Right basilar atelectasis. No sizeable effusion.   ABDOMEN: No remarkable upper abdominal findings.   BONES: Generalized diffuse osteopenia. There are acute mildly displaced fractures of the right 7th, 8th and 9th ribs posterolaterally. Multilevel degenerative changes of the spine.       Cardiomegaly with mild interstitial prominence which could be chronic or relate to component developing interstitial edema. Correlate clinically.   Acute fractures of the right 7th through 9th ribs as described above. There is a subtle linear density subjacent to the site of fractures. A tiny right-sided pneumothorax can not be excluded. Attention on continued short-term follow-up or further evaluation with CT is suggested.   MACRO: Polly Weinberg discussed the significance and urgency of this critical finding by telephone with  SHANDRA MARTINEZ on 5/4/2024 at 2:43 am. (**-RCF-**) Findings:  See findings.   Signed by: Polly Weinberg 5/4/2024 2:44 AM Dictation workstation:   [id number]    Assessment/Plan      Principal Problem:    Pneumothorax    [facility] [place] [facility]  Creatinine 2.3 this morning, baseline is between 0.9-1  Insert Morales catheter due to distention and low output, only 120 mL visualized on bladder scan this morning  Consult nephrology, recommendations appreciated  IV fluids  Renally dose meds, avoid nephrotoxic medications  Monitor BMP closely     Acute hypoxic respiratory failure  Pneumothorax  Currently on 2L oxygen via [place]  Secondary to above  Monitor pulse ox, wean as tolerated  S/p chest tube placement  CT to water seal without air leak  General surgery managing, CXR tomorrow, if no PTX plan to remove chest tube  Incentive spirometer      Fall   Multiple rib fractures   Imaging as above  Pain management    Atrial fibrillation  S/p recent pacemaker placement 4/19/24  Coumadin currently  on hold due to fall, rib fx, pneumo/hemothorax s/p CT placement  Monitor on tele  Daily PT/INR  Continue metoprolol  Will reach out to EP and make aware   Resume Coumadin when cleared by general surgery      Fall  Fall precautions  PT/OT/OOB  Patient and daughter would like patient to go home on discharge     Diabetes Mellitus  Monitor blood glucose  Sliding scale insulin  Hypoglycemia protocol  hgbA1C     Chronic Diastolic Heart Failure  Was given IV Lasix by ICU NP  Daily weights  Monitor I/O  Resume home metoprolol with parameters, will hold losartan due to MARIA DEL ROSARIO    DVT/GI  SCDs, H2 blocker       5/6/2024: With sudden increase in creatinine and phosphorus today.  Low urine output overnight, bladder scan demonstrated 120 mLs but per nursing was unable to visualize bladder completely.  Will insert Morales catheter and consult nephrology.  Also with distended, generalized lower abdominal/suprapubic pain.  CT abdomen pelvis has been ordered.  CBC demonstrates leukocytosis this morning, UA/culture, blood cultures have been ordered.  Consulted infectious disease.  Will continue to follow.  5/7/24: Restarted coumadin yesterday, monitoring INR. MARIA DEL ROSARIO and leukocytosis improving from yesterday. Family declining SNF. Will continue to follow.       Ruth Ann Marsh, APRN-CNP

## 2024-05-07 NOTE — PROGRESS NOTES
Occupational Therapy    Occupational Therapy Treatment    Name: Audelia Virgen  MRN: [id number]  : 3/24/1930  Date: 24  Time Calculation  Start Time: 0815  Stop Time: 0850  Time Calculation (min): 35 min    Assessment:    Patient displayed some improvement in the areas of self care, transfer ability, and sitting/standing  balance.  She tolerated session fair.  Continues to have decreased aerobic capacity, decreased balance, weakness, and some cognitive deficits.  Acute OT services are still indicated.  Will continue to see patient and follow POC as written.    End of Session Communication: Bedside nurse  End of Session Patient Position: Up in chair    Plan:  Treatment Interventions: ADL retraining, Functional transfer training, UE strengthening/ROM, Endurance training, Patient/family training, Neuromuscular reeducation, Compensatory technique education  OT Frequency: 5 times per week  OT Discharge Recommendations: Moderate intensity level of continued care  Equipment Recommended upon Discharge: Wheeled walker  OT Recommended Transfer Status: Assist of 2, Maximum assist  OT - OK to Discharge: Yes    Subjective   Previous Visit Info:  OT Last Visit  OT Received On: 24    General:  General  Reason for Referral: decreased functional status  Referred By: Wilfredo Valle PA-C  Family/Caregiver Present: No  Co-Treatment: PT  Co-Treatment Reason: medically complexed patient in the ICU  Prior to Session Communication: Bedside nurse  Patient Position Received: Bed, 3 rail up    Precautions:  Medical Precautions: Fall precautions, Oxygen therapy device and L/min  Post-Surgical Precautions: Other (comment) (pacemaker located on the right side with precautions) Has 3L 02 per nasal cannula    Vitals:  Vital Signs  Heart Rate: 94  Heart Rate Source: Monitor  SpO2: 92 %  BP: 117/87  MAP (mmHg): 98    Pain Assessment:  Pain Assessment  Pain Assessment: 0-10  Pain Score: 0 - No pain     Objective   Cognition:  Overall  Cognitive Status: Within Functional Limits  Orientation Level: Oriented X4  Safety/Judgement: Exceptions to WFL    Activities of Daily Living: Grooming  Grooming Level of Assistance: Minimum assistance  Grooming Where Assessed: Chair  Grooming Comments: hair combing moderate assist, face wash set up, hand wash set up       Bed Mobility/Transfers: Bed Mobility  Bed Mobility: Yes  Bed Mobility 1  Bed Mobility 1: Supine to sitting  Level of Assistance 1: Moderate assistance    Transfers  Transfer: Yes  Transfer 1  Transfer From 1: Bed to  Transfer to 1: Stand  Technique 1: Sit to stand  Transfer Device 1: Walker  Transfer Level of Assistance 1: Moderate assistance  Transfers 2  Transfer From 2: Stand to  Transfer to 2: Chair with arms  Technique 2: Stand to sit  Transfer Device 2: Walker  Transfer Level of Assistance 2: Moderate assistance    Sitting Balance:  Static Sitting Balance  Static Sitting-Balance Support: Feet supported  Static Sitting-Level of Assistance: Minimum assistance  Static Sitting-Comment/Number of Minutes: sitting EOB    Standing Balance:  Static Standing Balance  Static Standing-Balance Support: Bilateral upper extremity supported  Static Standing-Level of Assistance: Moderate assistance      Outcome Measures:  [facility] Daily Activity  Putting on and taking off regular lower body clothing: Total  Bathing (including washing, rinsing, drying): Total  Putting on and taking off regular upper body clothing: A lot  Toileting, which includes using toilet, bedpan or urinal: Total  Taking care of personal grooming such as brushing teeth: None  Eating Meals: None  Daily Activity - Total Score: 13    Education Documentation  No documentation found.  Education Comments  No comments found.      Goals:    Problem: Functional Balance  Goal: STG - Maintains static sitting balance with upper extremity support and close supervision during functional activity  Description: I  Outcome: Progressing     Problem:  Bathing  Goal: STG - Patient will bathe upper body with close vsupervision  Outcome: Progressing     Problem: Dressing Upper Extremities  Goal: LTG - Patient will complete upper body dressing with close supervision.  Outcome: Progressing     Problem: Eating  Goal: LTG - Patient will feed self independently  Outcome: Progressing     Problem: Grooming  Goal: STG - Patient will perform  grooming independent with set up  Outcome: Progressing     Problem: Toileting  Goal: STG - Patient will complete toileting tasks with close supervision and 2ww  Outcome: Progressing     Problem: OT Transfers  Goal: LTG - Patient will transfer from one surface to another with close supervision and 2ww  Outcome: Progressing

## 2024-05-07 NOTE — PROGRESS NOTES
"Audelia Virgen is a 89 y.o. female on day 3 of admission presenting with Pneumothorax.    Subjective   Patient is asleep in bed.  Patient's daughter is present at the bedside.  She is upset and states that her mother is \"steadily declining since coming into the hospital\".     Objective     Physical Exam  Vitals and nursing note reviewed.   Constitutional:       General: She is sleeping.      Appearance: Normal appearance.   HENT:      Head: Normocephalic and atraumatic.   Cardiovascular:      Rate and Rhythm: Normal rate.   Pulmonary:      Effort: Pulmonary effort is normal. No respiratory distress.   Chest:      Comments: Right sided chest tube in place    Abdominal:      General: Abdomen is flat. Bowel sounds are normal. There is distension.      Palpations: Abdomen is soft.      Comments: Distension same as yesterday, abdomen is soft   Musculoskeletal:         General: No swelling or tenderness.   Skin:     General: Skin is warm and dry.   Neurological:      General: No focal deficit present.      Motor: Weakness present.         Last Recorded Vitals  Blood pressure 117/87, pulse 80, temperature 36.4 °C (97.5 °F), resp. rate 23, height 1.626 m (5' 4.02\"), weight 61.8 kg (136 lb 3.9 oz), SpO2 94%.  Intake/Output last 3 Shifts:  I/O last 3 completed shifts:  In: 626.7 (10.1 mL/kg) [P.O.:50; I.V.:576.7 (9.3 mL/kg)]  Out: 530 (8.6 mL/kg) [Urine:500 (0.2 mL/kg/hr); Chest Tube:30]  Weight: 61.8 kg     Relevant Results  Lab Results   Component Value Date    WBC 10.2 05/07/2024    HGB 10.5 (L) 05/07/2024    HCT 30.6 (L) 05/07/2024    MCV 92 05/07/2024     (L) 05/07/2024     Lab Results   Component Value Date    GLUCOSE 75 05/07/2024    CALCIUM 8.4 (L) 05/07/2024     05/07/2024    K 4.1 05/07/2024    CO2 22 (L) 05/07/2024     05/07/2024    BUN 78 (H) 05/07/2024    CREATININE 1.80 (H) 05/07/2024       Assessment/Plan   Principal Problem:    Pneumothorax  5/7: Chest tube placed to waterseal this morning " will obtain 2 view chest x-ray this morning to evaluate hemothorax, pneumothorax.  Patient maintaining O2 saturation at 97% on 2 L.  Creatinine improved today and urine output improved.  Planning on stopping IV fluids tomorrow per nephrology.  Coumadin restarted, bridging with Lovenox.   I had a long discussion with patient's daughter today regarding plans for discharge.  The daughter states that patient will refuse nursing facility and in the past has signed herself out after 2 days.  She wants the patient to be discharged home, I explained my concerns with patient discharging home after recent pacemaker being placed and a recent fall.  Daughter reports that the patient will not be alone because either her or her  will be present.  Daughter is concerned because she feels the patient is steadily declining while inpatient.  We have stopped narcotics.  Encourage out of bed when possible and continue working with therapy.  Patient is moving her bowels without issues.   If patient and daughter do refuse skilled nursing facility, would like to get home health care set up. Will follow-up on chest x-ray results and see if we can get the chest tube removed today.    5/6: Chest xray ordered for this morning. If no pneumo, will plan to remove CT today. Continue with PT/OT. Nephrology consulted for MARIA DEL ROSARIO. OK to restart coumadin    89 yo woman s/p fall with right sided rib fractures and PTX s/p pigtail chest tube placement  - Chest tube to water seal  - Repeat xray in morning, if no PTX, then chest tube can be removed  - PT/OT  - Patient and daughter would prefer that she discharge to home         I spent 45 minutes in the professional and overall care of this patient.      Giovanni Nelson, APRN-CNP

## 2024-05-07 NOTE — PROGRESS NOTES
INFECTIOUS DISEASES PROGRESS NOTE    Consulted / following patient for:  Fever, leukocytosis    Subjective   Interval History:   Feels weak and tired.  Denies chest pain or dyspnea     Objective   PHYSICAL EXAMINATION  Vital signs:  Visit Vitals  /87   Pulse 87   Temp 36.4 °C (97.5 °F)   Resp 21      General: Sitting up in a bedside chair, alert and interactive.  Not toxic  Lungs: Shallow, clear.  Chest tube in place on the right  Heart:  S1, S2 normal    Relevant Results  WBC: 14,800 --> 10,200  Creatinine: 1.1 ---> 1.4 ---> 2.3 ---> 1.8    Results from last 72 hours   Lab Units 05/04/24  0848   AST U/L 23   ALT U/L 15   ALK PHOS U/L 61   BILIRUBIN TOTAL mg/dL 0.6     Microbiology:  Blood: Negative X2  Urine: Pending    Imaging:  CT chest/abdomen/pelvis:   IMPRESSION:  Chest  1.  Interval placement of a pigtail thoracostomy tube on the right  with overall decreased size of right hemopneumothorax. The  pneumothorax is smaller whereas the hemothorax is a little larger.  Subcutaneous emphysema on the right persists.  2. Ground-glass opacity within the anterior segment of right upper  lobe with associated airspace consolidation centrally most likely due  to pulmonary parenchymal contusion and hemorrhage.  3. Acute displaced fractures of the right 7th through 9th ribs.  4. Airspace consolidation in medial left lower lobe in a paraspinous  location with trace left pleural effusion. Compressive atelectasis in  the right lower lobe has increased since prior study.      Abdomen-Pelvis  1.  Cholelithiasis.  2. Diverticulosis of the colon.  3. Decompressed urinary bladder with Morales catheter satisfactorily  positioned.      ASSESSMENT:  Leukocytosis/transient fever  Patient was admitted with a traumatic pneumothorax and has had a chest tube placed.  Overall improving without empiric antibiotic therapy.  With resolution of fever and leukocytosis    Acute kidney injury  Nephrology consulting, creatinine improving      PLANS:  No additional suggestions    WILL SIGN OFF.  PLEASE RE-CONSULT PRN.  THANK YOU.    Josr Davis MD  [employer] [facility] [employer]  Office:  [phone]

## 2024-05-07 NOTE — NURSING NOTE
Patient pacemaker found to not be capturing. Ruth Ann ETIENNE notified. Order to interrogate pacer. Done. Coleen ETIENNE checked telemetry, and will follow up on information from interrogation.

## 2024-05-07 NOTE — PROGRESS NOTES
"Audelia Virgen is a 89 y.o. female on day 3 of admission presenting with Pneumothorax.    Subjective   Patient was seen yesterday for acute kidney injury she was admitted after she had sustained a fall she had rib fracture on the right side with hemothorax she had a pigtail catheter placed in the right side chest tube is coming generalized pain her abdominal pain is better I did review her CAT scan of the abdomen pelvis which showed cholelithiasis with some diverticulosis however there is no acute process output had improved       Objective     Physical Exam  Neck:      Vascular: No carotid bruit.   Cardiovascular:      Rate and Rhythm: Normal rate and regular rhythm.      Heart sounds: No murmur heard.     No friction rub. No gallop.   Pulmonary:      Breath sounds: No wheezing, rhonchi or rales.      Comments: Right-sided chest tube  Chest:      Chest wall: No tenderness.   Abdominal:      General: There is no distension.      Tenderness: There is no abdominal tenderness. There is no guarding or rebound.   Musculoskeletal:         General: No swelling or tenderness.      Cervical back: Neck supple.      Right lower leg: No edema.      Left lower leg: No edema.   Lymphadenopathy:      Cervical: No cervical adenopathy.         Last Recorded Vitals  Blood pressure 117/87, pulse 85, temperature 36.4 °C (97.5 °F), resp. rate 21, height 1.626 m (5' 4.02\"), weight 61.8 kg (136 lb 3.9 oz), SpO2 92%.    Intake/Output last 3 Shifts:  I/O last 3 completed shifts:  In: 626.7 (10.1 mL/kg) [P.O.:50; I.V.:576.7 (9.3 mL/kg)]  Out: 530 (8.6 mL/kg) [Urine:500 (0.2 mL/kg/hr); Chest Tube:30]  Weight: 61.8 kg     Current Facility-Administered Medications:     acetaminophen (Tylenol) oral liquid 650 mg, 650 mg, oral, q4h PRN **OR** acetaminophen (Tylenol) tablet 650 mg, 650 mg, oral, q4h PRN, Linda Sanchez PA-C, 650 mg at 05/06/24 2002    allopurinol (Zyloprim) tablet 100 mg, 100 mg, oral, Nightly, Linda Sanchez PA-C, 100 mg at " 05/06/24 2003    [Held by provider] calcium carbonate (Oscal) tablet 1,250 mg, 1,250 mg, oral, Daily, Linda Sanchez PA-C    dextrose 50 % injection 12.5 g, 12.5 g, intravenous, q15 min PRN, Linda Sanchez PA-C    dextrose 50 % injection 25 g, 25 g, intravenous, q15 min PRN, Linda Sanchez PA-C    enoxaparin (Lovenox) syringe 30 mg, 30 mg, subcutaneous, Daily, Linda Sanchez PA-C, 30 mg at 05/07/24 0915    famotidine (Pepcid) tablet 20 mg, 20 mg, oral, Daily, CATRACHITO Joaquin, 20 mg at 05/07/24 0915    glucagon (Glucagen) injection 1 mg, 1 mg, intramuscular, q15 min PRN, Linda Sanchez PA-C    glucagon (Glucagen) injection 1 mg, 1 mg, intramuscular, q15 min PRN, Linda Sanchez PA-C    insulin lispro (HumaLOG) injection 0-5 Units, 0-5 Units, subcutaneous, Before meals & nightly, Linda Sanchez PA-C    lidocaine 4 % patch 1 patch, 1 patch, transdermal, Daily, Linda Sanchez PA-C, 1 patch at 05/07/24 0915    [Held by provider] losartan (Cozaar) tablet 12.5 mg, 12.5 mg, oral, Daily, Linda Sanchez PA-C, 12.5 mg at 05/05/24 0831    metoprolol tartrate (Lopressor) tablet 12.5 mg, 12.5 mg, oral, BID, Linda Sanchez PA-C, 12.5 mg at 05/07/24 0915    ondansetron ODT (Zofran-ODT) disintegrating tablet 4 mg, 4 mg, oral, q8h PRN **OR** ondansetron (Zofran) injection 4 mg, 4 mg, intravenous, q8h PRN, Linda Sanchez PA-C, 4 mg at 05/07/24 0920    oxygen (O2) therapy, , inhalation, Continuous PRN - O2/gases, Linda Sanchez PA-C    polyethylene glycol (Glycolax, Miralax) packet 17 g, 17 g, oral, Daily, Matt BUNN MD    prochlorperazine (Compazine) injection 5 mg, 5 mg, intravenous, q6h PRN, Carmencita Guevara DO, 5 mg at 05/05/24 1609    sodium chloride 0.9% infusion, 100 mL/hr, intravenous, Continuous, Derick Herrera MD, Last Rate: 100 mL/hr at 05/07/24 0509, 100 mL/hr at 05/07/24 0509    traMADol (Ultram) tablet 50 mg, 50 mg, oral, q8h PRN, Ruth Ann Marsh APRN-CNP, 50 mg at 05/07/24 0509    warfarin  (Coumadin) tablet 5 mg, 5 mg, oral, Daily, Ruth Ann Marsh, APRN-CNP, 5 mg at 05/06/24 1810   Relevant Results    Results for orders placed or performed during the hospital encounter of 05/04/24 (from the past 96 hour(s))   Comprehensive metabolic panel   Result Value Ref Range    Glucose 120 (H) 65 - 99 mg/dL    Sodium 142 133 - 145 mmol/L    Potassium 4.1 3.4 - 5.1 mmol/L    Chloride 104 97 - 107 mmol/L    Bicarbonate 26 24 - 31 mmol/L    Urea Nitrogen 34 (H) 8 - 25 mg/dL    Creatinine 1.10 0.40 - 1.60 mg/dL    eGFR 47 (L) >60 mL/min/1.73m*2    Calcium 9.5 8.5 - 10.4 mg/dL    Albumin 3.8 3.5 - 5.0 g/dL    Alkaline Phosphatase 61 35 - 125 U/L    Total Protein 6.8 5.9 - 7.9 g/dL    AST 23 5 - 40 U/L    Bilirubin, Total 0.6 0.1 - 1.2 mg/dL    ALT 15 5 - 40 U/L    Anion Gap 12 <=19 mmol/L   Coagulation Screen   Result Value Ref Range    Protime 19.0 (H) 9.3 - 12.7 seconds    INR 1.9 (H) 0.9 - 1.2    aPTT 31.3 22.0 - 32.5 seconds   Fibrinogen   Result Value Ref Range    Fibrinogen 335 200 - 400 mg/dL   Magnesium   Result Value Ref Range    Magnesium 1.60 1.60 - 3.10 mg/dL   Phosphorus   Result Value Ref Range    Phosphorus 3.5 2.5 - 4.5 mg/dL   POCT GLUCOSE   Result Value Ref Range    POCT Glucose 105 (H) 74 - 99 mg/dL   POCT GLUCOSE   Result Value Ref Range    POCT Glucose 147 (H) 74 - 99 mg/dL   POCT GLUCOSE   Result Value Ref Range    POCT Glucose 111 (H) 74 - 99 mg/dL   CBC and Auto Differential   Result Value Ref Range    WBC 8.4 4.4 - 11.3 x10*3/uL    nRBC 0.0 0.0 - 0.0 /100 WBCs    RBC 3.31 (L) 4.00 - 5.20 x10*6/uL    Hemoglobin 10.6 (L) 12.0 - 16.0 g/dL    Hematocrit 32.4 (L) 36.0 - 46.0 %    MCV 98 80 - 100 fL    MCH 32.0 26.0 - 34.0 pg    MCHC 32.7 32.0 - 36.0 g/dL    RDW 14.6 (H) 11.5 - 14.5 %    Platelets 141 (L) 150 - 450 x10*3/uL    Neutrophils % 65.6 40.0 - 80.0 %    Immature Granulocytes %, Automated 0.2 0.0 - 0.9 %    Lymphocytes % 19.6 13.0 - 44.0 %    Monocytes % 12.2 2.0 - 10.0 %    Eosinophils % 1.7  0.0 - 6.0 %    Basophils % 0.7 0.0 - 2.0 %    Neutrophils Absolute 5.52 (H) 1.60 - 5.50 x10*3/uL    Immature Granulocytes Absolute, Automated 0.02 0.00 - 0.50 x10*3/uL    Lymphocytes Absolute 1.65 0.80 - 3.00 x10*3/uL    Monocytes Absolute 1.03 (H) 0.05 - 0.80 x10*3/uL    Eosinophils Absolute 0.14 0.00 - 0.40 x10*3/uL    Basophils Absolute 0.06 0.00 - 0.10 x10*3/uL   Basic metabolic panel   Result Value Ref Range    Glucose 102 (H) 65 - 99 mg/dL    Sodium 140 133 - 145 mmol/L    Potassium 4.0 3.4 - 5.1 mmol/L    Chloride 103 97 - 107 mmol/L    Bicarbonate 28 24 - 31 mmol/L    Urea Nitrogen 44 (H) 8 - 25 mg/dL    Creatinine 1.40 0.40 - 1.60 mg/dL    eGFR 35 (L) >60 mL/min/1.73m*2    Calcium 8.8 8.5 - 10.4 mg/dL    Anion Gap 9 <=19 mmol/L   Magnesium   Result Value Ref Range    Magnesium 2.10 1.60 - 3.10 mg/dL   Protime-INR   Result Value Ref Range    Protime 18.5 (H) 9.3 - 12.7 seconds    INR 1.8 (H) 0.9 - 1.2   POCT GLUCOSE   Result Value Ref Range    POCT Glucose 90 74 - 99 mg/dL   CBC and Auto Differential   Result Value Ref Range    WBC 14.8 (H) 4.4 - 11.3 x10*3/uL    nRBC 0.0 0.0 - 0.0 /100 WBCs    RBC 3.55 (L) 4.00 - 5.20 x10*6/uL    Hemoglobin 11.3 (L) 12.0 - 16.0 g/dL    Hematocrit 34.6 (L) 36.0 - 46.0 %    MCV 98 80 - 100 fL    MCH 31.8 26.0 - 34.0 pg    MCHC 32.7 32.0 - 36.0 g/dL    RDW 14.6 (H) 11.5 - 14.5 %    Platelets 153 150 - 450 x10*3/uL    Immature Granulocytes %, Automated 1.1 (H) 0.0 - 0.9 %    Immature Granulocytes Absolute, Automated 0.16 0.00 - 0.50 x10*3/uL   Renal Function Panel   Result Value Ref Range    Glucose 107 (H) 65 - 99 mg/dL    Sodium 139 133 - 145 mmol/L    Potassium 4.6 3.4 - 5.1 mmol/L    Chloride 100 97 - 107 mmol/L    Bicarbonate 25 24 - 31 mmol/L    Urea Nitrogen 65 (H) 8 - 25 mg/dL    Creatinine 2.30 (H) 0.40 - 1.60 mg/dL    eGFR 19 (L) >60 mL/min/1.73m*2    Calcium 8.7 8.5 - 10.4 mg/dL    Phosphorus 5.2 (H) 2.5 - 4.5 mg/dL    Albumin 3.3 (L) 3.5 - 5.0 g/dL    Anion Gap 14  <=19 mmol/L   Manual Differential   Result Value Ref Range    Neutrophils %, Manual 57.0 40.0 - 80.0 %    Bands %, Manual 31.0 0.0 - 5.0 %    Lymphocytes %, Manual 2.0 13.0 - 44.0 %    Monocytes %, Manual 5.0 2.0 - 10.0 %    Eosinophils %, Manual 0.0 0.0 - 6.0 %    Basophils %, Manual 0.0 0.0 - 2.0 %    Metamyelocytes %, Manual 5.0 0.0 - 0.0 %    Seg Neutrophils Absolute, Manual 8.44 (H) 1.60 - 5.00 x10*3/uL    Bands Absolute, Manual 4.59 (H) 0.00 - 0.50 x10*3/uL    Lymphocytes Absolute, Manual 0.30 (L) 0.80 - 3.00 x10*3/uL    Monocytes Absolute, Manual 0.74 0.05 - 0.80 x10*3/uL    Eosinophils Absolute, Manual 0.00 0.00 - 0.40 x10*3/uL    Basophils Absolute, Manual 0.00 0.00 - 0.10 x10*3/uL    Metamyelocytes Absolute, Manual 0.74 0.00 - 0.00 x10*3/uL    Total Cells Counted 100     Neutrophils Absolute, Manual 13.03 (H) 1.60 - 5.50 x10*3/uL    RBC Morphology No significant RBC morphology present    Blood Culture    Specimen: Peripheral Venipuncture; Blood culture   Result Value Ref Range    Blood Culture Loaded on Instrument - Culture in progress    Blood Culture    Specimen: Peripheral Venipuncture; Blood culture   Result Value Ref Range    Blood Culture Loaded on Instrument - Culture in progress    Protime-INR   Result Value Ref Range    Protime 14.6 (H) 9.3 - 12.7 seconds    INR 1.4 (H) 0.9 - 1.2   POCT GLUCOSE   Result Value Ref Range    POCT Glucose 95 74 - 99 mg/dL   Urinalysis with Reflex Microscopic   Result Value Ref Range    Color, Urine Yellow Light-Yellow, Yellow, Dark-Yellow    Appearance, Urine Turbid (N) Clear    Specific Gravity, Urine 1.025 1.005 - 1.035    pH, Urine 5.0 5.0, 5.5, 6.0, 6.5, 7.0, 7.5, 8.0    Protein, Urine 30 (1+) (A) NEGATIVE, 10 (TRACE), 20 (TRACE) mg/dL    Glucose, Urine Normal Normal mg/dL    Blood, Urine NEGATIVE NEGATIVE    Ketones, Urine NEGATIVE NEGATIVE mg/dL    Bilirubin, Urine 0.5 (1+) (A) NEGATIVE    Urobilinogen, Urine 2 (1+) (A) Normal mg/dL    Nitrite, Urine NEGATIVE  NEGATIVE    Leukocyte Esterase, Urine NEGATIVE NEGATIVE   Microscopic Only, Urine   Result Value Ref Range    WBC, Urine 1-5 1-5, NONE /HPF    RBC, Urine 1-2 NONE, 1-2, 3-5 /HPF    Squamous Epithelial Cells, Urine 1-9 (SPARSE) Reference range not established. /HPF    Mucus, Urine FEW Reference range not established. /LPF    Hyaline Casts, Urine 3+ (A) NONE /LPF   Creatine Kinase   Result Value Ref Range    Creatine Kinase 191 24 - 195 U/L   Sodium, Urine Random   Result Value Ref Range    Sodium, Urine Random 20 NOT ESTABLISHED mmol/L    Creatinine, Urine Random 203.2 NOT ESTABLISHED mg/dL    Sodium/Creatinine Ratio 10 Not established. mmol/g Creat   POCT GLUCOSE   Result Value Ref Range    POCT Glucose 89 74 - 99 mg/dL   POCT GLUCOSE   Result Value Ref Range    POCT Glucose 88 74 - 99 mg/dL   POCT GLUCOSE   Result Value Ref Range    POCT Glucose 68 (L) 74 - 99 mg/dL   POCT GLUCOSE   Result Value Ref Range    POCT Glucose 106 (H) 74 - 99 mg/dL   CBC and Auto Differential   Result Value Ref Range    WBC 10.2 4.4 - 11.3 x10*3/uL    nRBC 0.0 0.0 - 0.0 /100 WBCs    RBC 3.33 (L) 4.00 - 5.20 x10*6/uL    Hemoglobin 10.5 (L) 12.0 - 16.0 g/dL    Hematocrit 30.6 (L) 36.0 - 46.0 %    MCV 92 80 - 100 fL    MCH 31.5 26.0 - 34.0 pg    MCHC 34.3 32.0 - 36.0 g/dL    RDW 14.7 (H) 11.5 - 14.5 %    Platelets 129 (L) 150 - 450 x10*3/uL    Immature Granulocytes %, Automated 1.3 (H) 0.0 - 0.9 %    Immature Granulocytes Absolute, Automated 0.13 0.00 - 0.50 x10*3/uL   Renal Function Panel   Result Value Ref Range    Glucose 75 65 - 99 mg/dL    Sodium 137 133 - 145 mmol/L    Potassium 4.1 3.4 - 5.1 mmol/L    Chloride 101 97 - 107 mmol/L    Bicarbonate 22 (L) 24 - 31 mmol/L    Urea Nitrogen 78 (H) 8 - 25 mg/dL    Creatinine 1.80 (H) 0.40 - 1.60 mg/dL    eGFR 26 (L) >60 mL/min/1.73m*2    Calcium 8.4 (L) 8.5 - 10.4 mg/dL    Phosphorus 4.5 2.5 - 4.5 mg/dL    Albumin 2.9 (L) 3.5 - 5.0 g/dL    Anion Gap 14 <=19 mmol/L   Manual Differential    Result Value Ref Range    Neutrophils %, Manual 58.0 40.0 - 80.0 %    Bands %, Manual 24.0 0.0 - 5.0 %    Lymphocytes %, Manual 10.0 13.0 - 44.0 %    Monocytes %, Manual 3.0 2.0 - 10.0 %    Eosinophils %, Manual 0.0 0.0 - 6.0 %    Basophils %, Manual 0.0 0.0 - 2.0 %    Metamyelocytes %, Manual 5.0 0.0 - 0.0 %    Seg Neutrophils Absolute, Manual 5.92 (H) 1.60 - 5.00 x10*3/uL    Bands Absolute, Manual 2.45 (H) 0.00 - 0.50 x10*3/uL    Lymphocytes Absolute, Manual 1.02 0.80 - 3.00 x10*3/uL    Monocytes Absolute, Manual 0.31 0.05 - 0.80 x10*3/uL    Eosinophils Absolute, Manual 0.00 0.00 - 0.40 x10*3/uL    Basophils Absolute, Manual 0.00 0.00 - 0.10 x10*3/uL    Metamyelocytes Absolute, Manual 0.51 0.00 - 0.00 x10*3/uL    Total Cells Counted 100     Neutrophils Absolute, Manual 8.37 (H) 1.60 - 5.50 x10*3/uL    RBC Morphology No significant RBC morphology present    Protime-INR   Result Value Ref Range    Protime 14.3 (H) 9.3 - 12.7 seconds    INR 1.4 (H) 0.9 - 1.2       Assessment/Plan        Acute kidney injury urine output and renal function is improving with IV fluids plan is to reduce her IV fluids and possible stopping the fluids tomorrow morning  Status post a fall  Right side ribs fracture  Hydrothorax status post chest tube placement  Chronic atrial fibrillation  Anemia possibly from blood loss        Derick Herrera MD

## 2024-05-07 NOTE — CARE PLAN
Problem: Functional Balance  Goal: STG - Maintains static sitting balance with upper extremity support and close supervision during functional activity  Description: I  Outcome: Progressing     Problem: Bathing  Goal: STG - Patient will bathe upper body with close vsupervision  Outcome: Progressing     Problem: Dressing Upper Extremities  Goal: LTG - Patient will complete upper body dressing with close supervision.  Outcome: Progressing     Problem: Eating  Goal: LTG - Patient will feed self independently  Outcome: Progressing     Problem: Grooming  Goal: STG - Patient will perform  grooming independent with set up  Outcome: Progressing     Problem: Toileting  Goal: STG - Patient will complete toileting tasks with close supervision and 2ww  Outcome: Progressing     Problem: OT Transfers  Goal: LTG - Patient will transfer from one surface to another with close supervision and 2ww  Outcome: Progressing

## 2024-05-07 NOTE — PROGRESS NOTES
Physical Therapy    Physical Therapy Treatment    Patient Name: Audelia Virgen  MRN: [id number]  Today's Date: 5/7/2024  Time Calculation  Start Time: 0820  Stop Time: 0845  Time Calculation (min): 25 min     Assessment/Plan   PT Assessment  PT Assessment Results: Decreased strength, Decreased endurance, Impaired balance, Decreased mobility, Impaired judgement, Decreased safety awareness, Pain  Rehab Prognosis: Good  Evaluation/Treatment Tolerance: Patient tolerated treatment well  Medical Staff Made Aware: Yes  Strengths: Ability to acquire knowledge, Premorbid level of function  Barriers to Participation: Comorbidities  End of Session Communication: Bedside nurse  Assessment Comment: Slow progress towards therapy goals; improved functional mobility status and mentation as compared to yesterday's treatment session, however remains a high falls risk and unsafe to return home alone at this time.  End of Session Patient Position: Up in chair, Alarm off, not on at start of session (RN aware)  PT Plan  Inpatient/Swing Bed or Outpatient: Inpatient  PT Plan  Treatment/Interventions: Bed mobility, Transfer training, Gait training, Balance training, Stair training, Neuromuscular re-education, Strengthening, Endurance training, Therapeutic exercise, Therapeutic activity  PT Plan: Skilled PT  PT Frequency: 5 times per week  PT Discharge Recommendations: Moderate intensity level of continued care  Equipment Recommended upon Discharge: Wheeled walker  PT Recommended Transfer Status: Assist x2  PT - OK to Discharge: Yes    General Visit Information:   PT  Visit  PT Received On: 05/07/24  Response to Previous Treatment: Patient with no complaints from previous session., Patient reporting fatigue but able to participate.  General  Reason for Referral: impaired functional mobility  Referred By: Wilfredo Valle PA-C  Past Medical History Relevant to Rehab: pacemaker  Missed Visit: No  Missed Visit Reason: Other  (Comment)  Family/Caregiver Present: Yes  Caregiver Feedback: daughter present at end of session  Co-Treatment: OT  Co-Treatment Reason: medically complex pt in ICU requiring two-person skilled assist for safe pt handling this date.  Prior to Session Communication: Bedside nurse  Patient Position Received: Bed, 3 rail up, Alarm off, not on at start of session  Preferred Learning Style: verbal  General Comment: Pt cleared for therapy via RN, received in supine, NAD, agreeable to participate in therapy with encouragement. (+) 3L O2 via NC, chest tube, telemetry, Morales.    Subjective   Precautions:  Precautions  Hearing/Visual Limitations: glasses; B hearing aids  Medical Precautions: Fall precautions, Chest tube, Oxygen therapy device and L/min, Other (comment) (R pacemaker precautions)  Post-Surgical Precautions: Other (comment) (R pacemaker precautions)  Vital Signs:  Vital Signs  Heart Rate: 85  SpO2: 92 %  BP: 117/87  MAP (mmHg): 98    Objective   Pain:  Pain Assessment  Pain Assessment: 0-10  Pain Score: 0 - No pain  Cognition:  Cognition  Overall Cognitive Status: Within Functional Limits  Orientation Level: Oriented X4  Processing Speed: Delayed  Postural Control:  Postural Control  Postural Control: Within Functional Limits  Static Sitting Balance  Static Sitting-Balance Support: Feet supported, Bilateral upper extremity supported  Static Sitting-Level of Assistance: Minimum assistance  Static Standing Balance  Static Standing-Balance Support: Bilateral upper extremity supported  Static Standing-Level of Assistance: Moderate assistance (x2)  Extremity/Trunk Assessments:  RLE   RLE : Within Functional Limits  LLE   LLE : Within Functional Limits  Activity Tolerance:  Activity Tolerance  Endurance: Decreased tolerance for upright activites  Treatments:  Therapeutic Exercise  Therapeutic Exercise Performed: Yes  Therapeutic Exercise Activity 1: B ankle pumps x 20  Therapeutic Exercise Activity 2: B seated knee  ext x 10  Therapeutic Exercise Activity 3: B seated hip flex x 10  Therapeutic Exercise Activity 4: seated isometric hip abduction x 10  Therapeutic Exercise Activity 5: seated isometric hip adduction x 10    Bed Mobility  Bed Mobility: Yes  Bed Mobility 1  Bed Mobility 1: Supine to sitting  Level of Assistance 1: Moderate assistance  Bed Mobility Comments 1: assist for trunk elevation and advancement of BLE off EOB; assist to scoot hips towards EOB with use of draw sheet    Ambulation/Gait Training  Ambulation/Gait Training Performed: Yes  Ambulation/Gait Training 1  Surface 1: Level tile  Device 1: Rolling walker  Assistance 1: Moderate assistance (x2)  Quality of Gait 1: Narrow base of support, Decreased step length, Shuffling gait, Forward flexed posture (non-reciprocating pattern, unsteady gait)  Comments/Distance (ft) 1: 3-4 steps to bedside chair; assist required for trunk support, balance, and safe negotiation of RW  Transfers  Transfer: Yes  Transfer 1  Transfer From 1: Sit to  Transfer to 1: Stand  Technique 1: Sit to stand  Transfer Device 1: Walker  Transfer Level of Assistance 1: Moderate assistance, +2  Trials/Comments 1: assist for forward weight shfit and balance; cueing for proper hand placement  Transfers 2  Transfer From 2: Stand to  Transfer to 2: Sit  Technique 2: Stand to sit  Transfer Device 2: Walker  Transfer Level of Assistance 2: Moderate assistance, +2  Trials/Comments 2: assist for safe eccentric lowering into chair    Stairs  Stairs: No    Outcome Measures:  [facility] Basic Mobility  Turning from your back to your side while in a flat bed without using bedrails: A little  Moving from lying on your back to sitting on the side of a flat bed without using bedrails: A lot  Moving to and from bed to chair (including a wheelchair): A lot  Standing up from a chair using your arms (e.g. wheelchair or bedside chair): A lot  To walk in hospital room: A lot  Climbing 3-5 steps with railing:  Total  Basic Mobility - Total Score: 12    Education Documentation  Mobility Training, taught by Karissa Schroeder, PT at 5/7/2024  9:26 AM.  Learner: Patient  Readiness: Acceptance  Method: Explanation, Demonstration  Response: Needs Reinforcement    Education Comments  No comments found.      OP EDUCATION:  Outpatient Education  Individual(s) Educated: Patient, Child  Education Provided: Fall Risk, POC  Risk and Benefits Discussed with Patient/Caregiver/Other: yes  Patient/Caregiver Demonstrated Understanding: yes  Plan of Care Discussed and Agreed Upon: yes  Patient Response to Education: Patient/Caregiver Verbalized Understanding of Information    Encounter Problems       Encounter Problems (Active)       Mobility       STG - Patient will ambulate 150' with LRAD and modified independence. (Progressing)       Start:  05/05/24    Expected End:  05/19/24            STG - Patient will ascend and descend two stairs with use of B handrails and modified independence. (Not Progressing)       Start:  05/05/24    Expected End:  05/19/24               PT Transfers       STG - Patient to transfer to and from sit to supine with independence. (Progressing)       Start:  05/05/24    Expected End:  05/19/24            STG - Patient will transfer sit to and from stand with LRAD and modified independence. (Progressing)       Start:  05/05/24    Expected End:  05/19/24               Pain          Pain - Adult

## 2024-05-08 ENCOUNTER — APPOINTMENT (OUTPATIENT)
Dept: RADIOLOGY | Facility: HOSPITAL | Age: 89
DRG: 200 | End: 2024-05-08
Payer: MEDICARE

## 2024-05-08 LAB
ALBUMIN SERPL-MCNC: 3 G/DL (ref 3.5–5)
ANION GAP SERPL CALC-SCNC: 13 MMOL/L
BASOPHILS # BLD AUTO: 0.03 X10*3/UL (ref 0–0.1)
BASOPHILS NFR BLD AUTO: 0.3 %
BUN SERPL-MCNC: 65 MG/DL (ref 8–25)
CALCIUM SERPL-MCNC: 8.7 MG/DL (ref 8.5–10.4)
CHLORIDE SERPL-SCNC: 102 MMOL/L (ref 97–107)
CO2 SERPL-SCNC: 23 MMOL/L (ref 24–31)
CREAT SERPL-MCNC: 1.1 MG/DL (ref 0.4–1.6)
EGFRCR SERPLBLD CKD-EPI 2021: 47 ML/MIN/1.73M*2
EOSINOPHIL # BLD AUTO: 0.07 X10*3/UL (ref 0–0.4)
EOSINOPHIL NFR BLD AUTO: 0.7 %
ERYTHROCYTE [DISTWIDTH] IN BLOOD BY AUTOMATED COUNT: 15 % (ref 11.5–14.5)
GLUCOSE BLD MANUAL STRIP-MCNC: 104 MG/DL (ref 74–99)
GLUCOSE BLD MANUAL STRIP-MCNC: 72 MG/DL (ref 74–99)
GLUCOSE BLD MANUAL STRIP-MCNC: 74 MG/DL (ref 74–99)
GLUCOSE BLD MANUAL STRIP-MCNC: 85 MG/DL (ref 74–99)
GLUCOSE SERPL-MCNC: 69 MG/DL (ref 65–99)
HCT VFR BLD AUTO: 30.2 % (ref 36–46)
HGB BLD-MCNC: 9.6 G/DL (ref 12–16)
IMM GRANULOCYTES # BLD AUTO: 0.07 X10*3/UL (ref 0–0.5)
IMM GRANULOCYTES NFR BLD AUTO: 0.7 % (ref 0–0.9)
INR PPP: 1.4 (ref 0.9–1.2)
LYMPHOCYTES # BLD AUTO: 0.73 X10*3/UL (ref 0.8–3)
LYMPHOCYTES NFR BLD AUTO: 6.9 %
MCH RBC QN AUTO: 31.1 PG (ref 26–34)
MCHC RBC AUTO-ENTMCNC: 31.8 G/DL (ref 32–36)
MCV RBC AUTO: 98 FL (ref 80–100)
MONOCYTES # BLD AUTO: 0.64 X10*3/UL (ref 0.05–0.8)
MONOCYTES NFR BLD AUTO: 6.1 %
NEUTROPHILS # BLD AUTO: 9.02 X10*3/UL (ref 1.6–5.5)
NEUTROPHILS NFR BLD AUTO: 85.3 %
NRBC BLD-RTO: 0 /100 WBCS (ref 0–0)
PHOSPHATE SERPL-MCNC: 3.3 MG/DL (ref 2.5–4.5)
PLATELET # BLD AUTO: 150 X10*3/UL (ref 150–450)
POTASSIUM SERPL-SCNC: 3.7 MMOL/L (ref 3.4–5.1)
PROTHROMBIN TIME: 14.4 SECONDS (ref 9.3–12.7)
RBC # BLD AUTO: 3.09 X10*6/UL (ref 4–5.2)
SODIUM SERPL-SCNC: 138 MMOL/L (ref 133–145)
WBC # BLD AUTO: 10.6 X10*3/UL (ref 4.4–11.3)

## 2024-05-08 PROCEDURE — 2500000001 HC RX 250 WO HCPCS SELF ADMINISTERED DRUGS (ALT 637 FOR MEDICARE OP): Performed by: STUDENT IN AN ORGANIZED HEALTH CARE EDUCATION/TRAINING PROGRAM

## 2024-05-08 PROCEDURE — 85025 COMPLETE CBC W/AUTO DIFF WBC: CPT

## 2024-05-08 PROCEDURE — 2060000001 HC INTERMEDIATE ICU ROOM DAILY

## 2024-05-08 PROCEDURE — 2500000004 HC RX 250 GENERAL PHARMACY W/ HCPCS (ALT 636 FOR OP/ED): Performed by: STUDENT IN AN ORGANIZED HEALTH CARE EDUCATION/TRAINING PROGRAM

## 2024-05-08 PROCEDURE — 36415 COLL VENOUS BLD VENIPUNCTURE: CPT | Performed by: HOSPITALIST

## 2024-05-08 PROCEDURE — 82947 ASSAY GLUCOSE BLOOD QUANT: CPT | Mod: 59

## 2024-05-08 PROCEDURE — 97530 THERAPEUTIC ACTIVITIES: CPT | Mod: GO

## 2024-05-08 PROCEDURE — 2500000006 HC RX 250 W HCPCS SELF ADMINISTERED DRUGS (ALT 637 FOR ALL PAYERS): Mod: MUE

## 2024-05-08 PROCEDURE — 85610 PROTHROMBIN TIME: CPT

## 2024-05-08 PROCEDURE — 71046 X-RAY EXAM CHEST 2 VIEWS: CPT

## 2024-05-08 PROCEDURE — 71046 X-RAY EXAM CHEST 2 VIEWS: CPT | Performed by: RADIOLOGY

## 2024-05-08 PROCEDURE — 80069 RENAL FUNCTION PANEL: CPT | Performed by: HOSPITALIST

## 2024-05-08 PROCEDURE — 97110 THERAPEUTIC EXERCISES: CPT | Mod: GO

## 2024-05-08 RX ADMIN — WARFARIN SODIUM 8 MG: 5 TABLET ORAL at 17:49

## 2024-05-08 RX ADMIN — METOPROLOL TARTRATE 12.5 MG: 25 TABLET, FILM COATED ORAL at 09:47

## 2024-05-08 RX ADMIN — TRAMADOL HYDROCHLORIDE 50 MG: 50 TABLET ORAL at 21:43

## 2024-05-08 RX ADMIN — ALLOPURINOL 100 MG: 100 TABLET ORAL at 20:16

## 2024-05-08 RX ADMIN — ENOXAPARIN SODIUM 30 MG: 30 INJECTION SUBCUTANEOUS at 09:46

## 2024-05-08 RX ADMIN — ACETAMINOPHEN 650 MG: 325 TABLET ORAL at 18:20

## 2024-05-08 RX ADMIN — METOPROLOL TARTRATE 12.5 MG: 25 TABLET, FILM COATED ORAL at 20:21

## 2024-05-08 RX ADMIN — ACETAMINOPHEN 650 MG: 325 TABLET ORAL at 13:07

## 2024-05-08 ASSESSMENT — PAIN - FUNCTIONAL ASSESSMENT
PAIN_FUNCTIONAL_ASSESSMENT: 0-10
PAIN_FUNCTIONAL_ASSESSMENT: FLACC (FACE, LEGS, ACTIVITY, CRY, CONSOLABILITY)
PAIN_FUNCTIONAL_ASSESSMENT: 0-10
PAIN_FUNCTIONAL_ASSESSMENT: FLACC (FACE, LEGS, ACTIVITY, CRY, CONSOLABILITY)
PAIN_FUNCTIONAL_ASSESSMENT: 0-10
PAIN_FUNCTIONAL_ASSESSMENT: 0-10

## 2024-05-08 ASSESSMENT — COGNITIVE AND FUNCTIONAL STATUS - GENERAL
TOILETING: A LITTLE
PERSONAL GROOMING: A LITTLE
MOVING TO AND FROM BED TO CHAIR: A LITTLE
CLIMB 3 TO 5 STEPS WITH RAILING: A LOT
DRESSING REGULAR LOWER BODY CLOTHING: A LITTLE
HELP NEEDED FOR BATHING: A LITTLE
STANDING UP FROM CHAIR USING ARMS: A LITTLE
WALKING IN HOSPITAL ROOM: A LITTLE
DAILY ACTIVITIY SCORE: 18
MOBILITY SCORE: 17
TURNING FROM BACK TO SIDE WHILE IN FLAT BAD: A LITTLE
MOVING FROM LYING ON BACK TO SITTING ON SIDE OF FLAT BED WITH BEDRAILS: A LITTLE
DRESSING REGULAR UPPER BODY CLOTHING: A LITTLE
EATING MEALS: A LITTLE

## 2024-05-08 ASSESSMENT — PAIN SCALES - PAIN ASSESSMENT IN ADVANCED DEMENTIA (PAINAD)
BREATHING: NORMAL
BREATHING: NORMAL
FACIALEXPRESSION: SMILING OR INEXPRESSIVE
TOTALSCORE: 2
CONSOLABILITY: NO NEED TO CONSOLE
BODYLANGUAGE: TENSE, DISTRESSED PACING, FIDGETING
TOTALSCORE: 0
FACIALEXPRESSION: SAD, FRIGHTENED, FROWN
BODYLANGUAGE: RELAXED
TOTALSCORE: MEDICATION (SEE MAR);REPOSITIONED
CONSOLABILITY: NO NEED TO CONSOLE

## 2024-05-08 ASSESSMENT — PAIN SCALES - GENERAL
PAINLEVEL_OUTOF10: 0 - NO PAIN
PAINLEVEL_OUTOF10: 5 - MODERATE PAIN
PAINLEVEL_OUTOF10: 6
PAINLEVEL_OUTOF10: 7
PAINLEVEL_OUTOF10: 0 - NO PAIN

## 2024-05-08 ASSESSMENT — PAIN DESCRIPTION - ORIENTATION
ORIENTATION: RIGHT
ORIENTATION: RIGHT;LEFT

## 2024-05-08 ASSESSMENT — PAIN DESCRIPTION - DESCRIPTORS
DESCRIPTORS: ACHING
DESCRIPTORS: ACHING

## 2024-05-08 ASSESSMENT — PAIN DESCRIPTION - LOCATION
LOCATION: SHOULDER
LOCATION: LEG

## 2024-05-08 ASSESSMENT — PAIN SCALES - WONG BAKER: WONGBAKER_NUMERICALRESPONSE: HURTS EVEN MORE

## 2024-05-08 NOTE — NURSING NOTE
Continued care of patient. Awake at times for water, Given. Still with chest tube to water seal. Not in pain or distress. Will continue to monitor.

## 2024-05-08 NOTE — NURSING NOTE
Assuming care of patient and Repositioned. Report done at bedside. Assessed , Right Chest Tube clamped as ordered. Denies pain or shortness of breath. Assessment as charted, Will monitor.

## 2024-05-08 NOTE — PROGRESS NOTES
"Audelia Virgen is a 89 y.o. female on day 4 of admission presenting with Pneumothorax.    Subjective   Patient was seen yesterday for acute kidney injury she was admitted after she had sustained a fall she had ribs fractures on the right side with hemothorax she had a pigtail catheter placed in the right side chest tube patient is more awake and responsive laying in bed she said she is not feeling better over to me she looks much better no vomiting or nausea still having some abdominal discomfort       Objective     Physical Exam  Neck:      Vascular: No carotid bruit.   Cardiovascular:      Rate and Rhythm: Normal rate and regular rhythm.      Heart sounds: No murmur heard.     No friction rub. No gallop.   Pulmonary:      Breath sounds: No wheezing, rhonchi or rales.      Comments: Right-sided chest tube  Chest:      Chest wall: No tenderness.   Abdominal:      General: There is no distension.      Tenderness: There is no abdominal tenderness. There is no guarding or rebound.   Musculoskeletal:         General: No swelling or tenderness.      Cervical back: Neck supple.      Right lower leg: No edema.      Left lower leg: No edema.   Lymphadenopathy:      Cervical: No cervical adenopathy.         Last Recorded Vitals  Blood pressure 148/72, pulse 64, temperature 36.4 °C (97.5 °F), temperature source Oral, resp. rate 18, height 1.626 m (5' 4.02\"), weight 62.8 kg (138 lb 7.2 oz), SpO2 95%.    Intake/Output last 3 Shifts:  I/O last 3 completed shifts:  In: 100 (1.6 mL/kg) [P.O.:100]  Out: 1630 (26 mL/kg) [Urine:1600 (0.7 mL/kg/hr); Chest Tube:30]  Weight: 62.8 kg     Current Facility-Administered Medications:     acetaminophen (Tylenol) oral liquid 650 mg, 650 mg, oral, q4h PRN **OR** acetaminophen (Tylenol) tablet 650 mg, 650 mg, oral, q4h PRN, Loyda Hadley MD, 650 mg at 05/06/24 2002    allopurinol (Zyloprim) tablet 100 mg, 100 mg, oral, Nightly, Loyda Hadley MD, 100 mg at 05/07/24 2115    [Held by provider] " calcium carbonate (Oscal) tablet 1,250 mg, 1,250 mg, oral, Daily, Loyda Hadley MD    dextrose 50 % injection 12.5 g, 12.5 g, intravenous, q15 min PRN, Loyda Hadley MD    dextrose 50 % injection 25 g, 25 g, intravenous, q15 min PRN, Loyda Hadley MD    enoxaparin (Lovenox) syringe 30 mg, 30 mg, subcutaneous, Daily, Loyda Hadley MD, 30 mg at 05/08/24 0946    famotidine (Pepcid) tablet 20 mg, 20 mg, oral, Daily, Loyda Hadley MD, 20 mg at 05/07/24 0915    glucagon (Glucagen) injection 1 mg, 1 mg, intramuscular, q15 min PRN, Loyda Hadley MD    glucagon (Glucagen) injection 1 mg, 1 mg, intramuscular, q15 min PRN, Loyda Hadley MD    insulin lispro (HumaLOG) injection 0-5 Units, 0-5 Units, subcutaneous, Before meals & nightly, Loyda Hadley MD    lidocaine 4 % patch 1 patch, 1 patch, transdermal, Daily, Loyda Hadley MD, 1 patch at 05/07/24 0915    [Held by provider] losartan (Cozaar) tablet 12.5 mg, 12.5 mg, oral, Daily, Loyda Hadley MD, 12.5 mg at 05/05/24 0831    metoprolol tartrate (Lopressor) tablet 12.5 mg, 12.5 mg, oral, BID, Loyda Hadley MD, 12.5 mg at 05/08/24 0947    ondansetron ODT (Zofran-ODT) disintegrating tablet 4 mg, 4 mg, oral, q8h PRN **OR** ondansetron (Zofran) injection 4 mg, 4 mg, intravenous, q8h PRN, Loyda Hadley MD, 4 mg at 05/07/24 0920    oxygen (O2) therapy, , inhalation, Continuous PRN - O2/gases, Linda Sanchez PA-C    polyethylene glycol (Glycolax, Miralax) packet 17 g, 17 g, oral, Daily, Loyda Hadley MD    prochlorperazine (Compazine) injection 5 mg, 5 mg, intravenous, q6h PRN, Loyda Hadley MD, 5 mg at 05/05/24 1609    traMADol (Ultram) tablet 50 mg, 50 mg, oral, q8h PRN, Loyda Hadley MD, 50 mg at 05/07/24 0509    warfarin (Coumadin) tablet 8 mg, 8 mg, oral, Daily, Ruth Ann Marsh, JAIMIE-CNP   Relevant Results    Results for orders placed or performed during the hospital encounter of 05/04/24 (from the past 96 hour(s))   POCT GLUCOSE   Result Value Ref  Range    POCT Glucose 147 (H) 74 - 99 mg/dL   POCT GLUCOSE   Result Value Ref Range    POCT Glucose 111 (H) 74 - 99 mg/dL   CBC and Auto Differential   Result Value Ref Range    WBC 8.4 4.4 - 11.3 x10*3/uL    nRBC 0.0 0.0 - 0.0 /100 WBCs    RBC 3.31 (L) 4.00 - 5.20 x10*6/uL    Hemoglobin 10.6 (L) 12.0 - 16.0 g/dL    Hematocrit 32.4 (L) 36.0 - 46.0 %    MCV 98 80 - 100 fL    MCH 32.0 26.0 - 34.0 pg    MCHC 32.7 32.0 - 36.0 g/dL    RDW 14.6 (H) 11.5 - 14.5 %    Platelets 141 (L) 150 - 450 x10*3/uL    Neutrophils % 65.6 40.0 - 80.0 %    Immature Granulocytes %, Automated 0.2 0.0 - 0.9 %    Lymphocytes % 19.6 13.0 - 44.0 %    Monocytes % 12.2 2.0 - 10.0 %    Eosinophils % 1.7 0.0 - 6.0 %    Basophils % 0.7 0.0 - 2.0 %    Neutrophils Absolute 5.52 (H) 1.60 - 5.50 x10*3/uL    Immature Granulocytes Absolute, Automated 0.02 0.00 - 0.50 x10*3/uL    Lymphocytes Absolute 1.65 0.80 - 3.00 x10*3/uL    Monocytes Absolute 1.03 (H) 0.05 - 0.80 x10*3/uL    Eosinophils Absolute 0.14 0.00 - 0.40 x10*3/uL    Basophils Absolute 0.06 0.00 - 0.10 x10*3/uL   Basic metabolic panel   Result Value Ref Range    Glucose 102 (H) 65 - 99 mg/dL    Sodium 140 133 - 145 mmol/L    Potassium 4.0 3.4 - 5.1 mmol/L    Chloride 103 97 - 107 mmol/L    Bicarbonate 28 24 - 31 mmol/L    Urea Nitrogen 44 (H) 8 - 25 mg/dL    Creatinine 1.40 0.40 - 1.60 mg/dL    eGFR 35 (L) >60 mL/min/1.73m*2    Calcium 8.8 8.5 - 10.4 mg/dL    Anion Gap 9 <=19 mmol/L   Magnesium   Result Value Ref Range    Magnesium 2.10 1.60 - 3.10 mg/dL   Protime-INR   Result Value Ref Range    Protime 18.5 (H) 9.3 - 12.7 seconds    INR 1.8 (H) 0.9 - 1.2   POCT GLUCOSE   Result Value Ref Range    POCT Glucose 90 74 - 99 mg/dL   CBC and Auto Differential   Result Value Ref Range    WBC 14.8 (H) 4.4 - 11.3 x10*3/uL    nRBC 0.0 0.0 - 0.0 /100 WBCs    RBC 3.55 (L) 4.00 - 5.20 x10*6/uL    Hemoglobin 11.3 (L) 12.0 - 16.0 g/dL    Hematocrit 34.6 (L) 36.0 - 46.0 %    MCV 98 80 - 100 fL    MCH 31.8  26.0 - 34.0 pg    MCHC 32.7 32.0 - 36.0 g/dL    RDW 14.6 (H) 11.5 - 14.5 %    Platelets 153 150 - 450 x10*3/uL    Immature Granulocytes %, Automated 1.1 (H) 0.0 - 0.9 %    Immature Granulocytes Absolute, Automated 0.16 0.00 - 0.50 x10*3/uL   Renal Function Panel   Result Value Ref Range    Glucose 107 (H) 65 - 99 mg/dL    Sodium 139 133 - 145 mmol/L    Potassium 4.6 3.4 - 5.1 mmol/L    Chloride 100 97 - 107 mmol/L    Bicarbonate 25 24 - 31 mmol/L    Urea Nitrogen 65 (H) 8 - 25 mg/dL    Creatinine 2.30 (H) 0.40 - 1.60 mg/dL    eGFR 19 (L) >60 mL/min/1.73m*2    Calcium 8.7 8.5 - 10.4 mg/dL    Phosphorus 5.2 (H) 2.5 - 4.5 mg/dL    Albumin 3.3 (L) 3.5 - 5.0 g/dL    Anion Gap 14 <=19 mmol/L   Manual Differential   Result Value Ref Range    Neutrophils %, Manual 57.0 40.0 - 80.0 %    Bands %, Manual 31.0 0.0 - 5.0 %    Lymphocytes %, Manual 2.0 13.0 - 44.0 %    Monocytes %, Manual 5.0 2.0 - 10.0 %    Eosinophils %, Manual 0.0 0.0 - 6.0 %    Basophils %, Manual 0.0 0.0 - 2.0 %    Metamyelocytes %, Manual 5.0 0.0 - 0.0 %    Seg Neutrophils Absolute, Manual 8.44 (H) 1.60 - 5.00 x10*3/uL    Bands Absolute, Manual 4.59 (H) 0.00 - 0.50 x10*3/uL    Lymphocytes Absolute, Manual 0.30 (L) 0.80 - 3.00 x10*3/uL    Monocytes Absolute, Manual 0.74 0.05 - 0.80 x10*3/uL    Eosinophils Absolute, Manual 0.00 0.00 - 0.40 x10*3/uL    Basophils Absolute, Manual 0.00 0.00 - 0.10 x10*3/uL    Metamyelocytes Absolute, Manual 0.74 0.00 - 0.00 x10*3/uL    Total Cells Counted 100     Neutrophils Absolute, Manual 13.03 (H) 1.60 - 5.50 x10*3/uL    RBC Morphology No significant RBC morphology present    Blood Culture    Specimen: Peripheral Venipuncture; Blood culture   Result Value Ref Range    Blood Culture No growth at 1 day    Blood Culture    Specimen: Peripheral Venipuncture; Blood culture   Result Value Ref Range    Blood Culture No growth at 1 day    Protime-INR   Result Value Ref Range    Protime 14.6 (H) 9.3 - 12.7 seconds    INR 1.4 (H) 0.9  - 1.2   POCT GLUCOSE   Result Value Ref Range    POCT Glucose 95 74 - 99 mg/dL   Urinalysis with Reflex Microscopic   Result Value Ref Range    Color, Urine Yellow Light-Yellow, Yellow, Dark-Yellow    Appearance, Urine Turbid (N) Clear    Specific Gravity, Urine 1.025 1.005 - 1.035    pH, Urine 5.0 5.0, 5.5, 6.0, 6.5, 7.0, 7.5, 8.0    Protein, Urine 30 (1+) (A) NEGATIVE, 10 (TRACE), 20 (TRACE) mg/dL    Glucose, Urine Normal Normal mg/dL    Blood, Urine NEGATIVE NEGATIVE    Ketones, Urine NEGATIVE NEGATIVE mg/dL    Bilirubin, Urine 0.5 (1+) (A) NEGATIVE    Urobilinogen, Urine 2 (1+) (A) Normal mg/dL    Nitrite, Urine NEGATIVE NEGATIVE    Leukocyte Esterase, Urine NEGATIVE NEGATIVE   Urine culture    Specimen: Clean Catch/Voided; Urine   Result Value Ref Range    Urine Culture No significant growth    Microscopic Only, Urine   Result Value Ref Range    WBC, Urine 1-5 1-5, NONE /HPF    RBC, Urine 1-2 NONE, 1-2, 3-5 /HPF    Squamous Epithelial Cells, Urine 1-9 (SPARSE) Reference range not established. /HPF    Mucus, Urine FEW Reference range not established. /LPF    Hyaline Casts, Urine 3+ (A) NONE /LPF   Creatine Kinase   Result Value Ref Range    Creatine Kinase 191 24 - 195 U/L   Sodium, Urine Random   Result Value Ref Range    Sodium, Urine Random 20 NOT ESTABLISHED mmol/L    Creatinine, Urine Random 203.2 NOT ESTABLISHED mg/dL    Sodium/Creatinine Ratio 10 Not established. mmol/g Creat   POCT GLUCOSE   Result Value Ref Range    POCT Glucose 89 74 - 99 mg/dL   POCT GLUCOSE   Result Value Ref Range    POCT Glucose 88 74 - 99 mg/dL   POCT GLUCOSE   Result Value Ref Range    POCT Glucose 68 (L) 74 - 99 mg/dL   POCT GLUCOSE   Result Value Ref Range    POCT Glucose 106 (H) 74 - 99 mg/dL   CBC and Auto Differential   Result Value Ref Range    WBC 10.2 4.4 - 11.3 x10*3/uL    nRBC 0.0 0.0 - 0.0 /100 WBCs    RBC 3.33 (L) 4.00 - 5.20 x10*6/uL    Hemoglobin 10.5 (L) 12.0 - 16.0 g/dL    Hematocrit 30.6 (L) 36.0 - 46.0 %    MCV  92 80 - 100 fL    MCH 31.5 26.0 - 34.0 pg    MCHC 34.3 32.0 - 36.0 g/dL    RDW 14.7 (H) 11.5 - 14.5 %    Platelets 129 (L) 150 - 450 x10*3/uL    Immature Granulocytes %, Automated 1.3 (H) 0.0 - 0.9 %    Immature Granulocytes Absolute, Automated 0.13 0.00 - 0.50 x10*3/uL   Renal Function Panel   Result Value Ref Range    Glucose 75 65 - 99 mg/dL    Sodium 137 133 - 145 mmol/L    Potassium 4.1 3.4 - 5.1 mmol/L    Chloride 101 97 - 107 mmol/L    Bicarbonate 22 (L) 24 - 31 mmol/L    Urea Nitrogen 78 (H) 8 - 25 mg/dL    Creatinine 1.80 (H) 0.40 - 1.60 mg/dL    eGFR 26 (L) >60 mL/min/1.73m*2    Calcium 8.4 (L) 8.5 - 10.4 mg/dL    Phosphorus 4.5 2.5 - 4.5 mg/dL    Albumin 2.9 (L) 3.5 - 5.0 g/dL    Anion Gap 14 <=19 mmol/L   Manual Differential   Result Value Ref Range    Neutrophils %, Manual 58.0 40.0 - 80.0 %    Bands %, Manual 24.0 0.0 - 5.0 %    Lymphocytes %, Manual 10.0 13.0 - 44.0 %    Monocytes %, Manual 3.0 2.0 - 10.0 %    Eosinophils %, Manual 0.0 0.0 - 6.0 %    Basophils %, Manual 0.0 0.0 - 2.0 %    Metamyelocytes %, Manual 5.0 0.0 - 0.0 %    Seg Neutrophils Absolute, Manual 5.92 (H) 1.60 - 5.00 x10*3/uL    Bands Absolute, Manual 2.45 (H) 0.00 - 0.50 x10*3/uL    Lymphocytes Absolute, Manual 1.02 0.80 - 3.00 x10*3/uL    Monocytes Absolute, Manual 0.31 0.05 - 0.80 x10*3/uL    Eosinophils Absolute, Manual 0.00 0.00 - 0.40 x10*3/uL    Basophils Absolute, Manual 0.00 0.00 - 0.10 x10*3/uL    Metamyelocytes Absolute, Manual 0.51 0.00 - 0.00 x10*3/uL    Total Cells Counted 100     Neutrophils Absolute, Manual 8.37 (H) 1.60 - 5.50 x10*3/uL    RBC Morphology No significant RBC morphology present    Protime-INR   Result Value Ref Range    Protime 14.3 (H) 9.3 - 12.7 seconds    INR 1.4 (H) 0.9 - 1.2   POCT GLUCOSE   Result Value Ref Range    POCT Glucose 92 74 - 99 mg/dL   POCT GLUCOSE   Result Value Ref Range    POCT Glucose 72 (L) 74 - 99 mg/dL   POCT GLUCOSE   Result Value Ref Range    POCT Glucose 72 (L) 74 - 99 mg/dL    CBC and Auto Differential   Result Value Ref Range    WBC 10.6 4.4 - 11.3 x10*3/uL    nRBC 0.0 0.0 - 0.0 /100 WBCs    RBC 3.09 (L) 4.00 - 5.20 x10*6/uL    Hemoglobin 9.6 (L) 12.0 - 16.0 g/dL    Hematocrit 30.2 (L) 36.0 - 46.0 %    MCV 98 80 - 100 fL    MCH 31.1 26.0 - 34.0 pg    MCHC 31.8 (L) 32.0 - 36.0 g/dL    RDW 15.0 (H) 11.5 - 14.5 %    Platelets 150 150 - 450 x10*3/uL    Neutrophils % 85.3 40.0 - 80.0 %    Immature Granulocytes %, Automated 0.7 0.0 - 0.9 %    Lymphocytes % 6.9 13.0 - 44.0 %    Monocytes % 6.1 2.0 - 10.0 %    Eosinophils % 0.7 0.0 - 6.0 %    Basophils % 0.3 0.0 - 2.0 %    Neutrophils Absolute 9.02 (H) 1.60 - 5.50 x10*3/uL    Immature Granulocytes Absolute, Automated 0.07 0.00 - 0.50 x10*3/uL    Lymphocytes Absolute 0.73 (L) 0.80 - 3.00 x10*3/uL    Monocytes Absolute 0.64 0.05 - 0.80 x10*3/uL    Eosinophils Absolute 0.07 0.00 - 0.40 x10*3/uL    Basophils Absolute 0.03 0.00 - 0.10 x10*3/uL   Renal Function Panel   Result Value Ref Range    Glucose 69 65 - 99 mg/dL    Sodium 138 133 - 145 mmol/L    Potassium 3.7 3.4 - 5.1 mmol/L    Chloride 102 97 - 107 mmol/L    Bicarbonate 23 (L) 24 - 31 mmol/L    Urea Nitrogen 65 (H) 8 - 25 mg/dL    Creatinine 1.10 0.40 - 1.60 mg/dL    eGFR 47 (L) >60 mL/min/1.73m*2    Calcium 8.7 8.5 - 10.4 mg/dL    Phosphorus 3.3 2.5 - 4.5 mg/dL    Albumin 3.0 (L) 3.5 - 5.0 g/dL    Anion Gap 13 <=19 mmol/L   Protime-INR   Result Value Ref Range    Protime 14.4 (H) 9.3 - 12.7 seconds    INR 1.4 (H) 0.9 - 1.2   POCT GLUCOSE   Result Value Ref Range    POCT Glucose 72 (L) 74 - 99 mg/dL       Assessment/Plan        Acute kidney injury renal function is much improved creatinine down to 1.1 my plan today is to discontinue IV fluids and continue to monitor renal function  Status post a fall  Right side ribs fracture  Hydrothorax status post chest tube placement  Chronic atrial fibrillation  Anemia possibly from blood loss        Derick Herrera MD

## 2024-05-08 NOTE — NURSING NOTE
Continued care of patient. Awake at times, Needs attended. Not in pain or distress. Call light in reach.l For chest X-Ray today. Right chest tube still clamped Will continue to monitor.

## 2024-05-08 NOTE — PROGRESS NOTES
"Audelia Virgen is a 89 y.o. female on day 4 of admission presenting with Pneumothorax.    Subjective   Patient alert this morning. Patient's daughter is present at the bedside.  Patient asking why she is in the hospital. Denies SOB, difficulty breathing, pain with breathing.     Objective     Physical Exam  Vitals and nursing note reviewed.   Constitutional:       General: She is sleeping.      Appearance: Normal appearance.   HENT:      Head: Normocephalic and atraumatic.      Nose: Nose normal.      Mouth/Throat:      Mouth: Mucous membranes are dry.   Cardiovascular:      Rate and Rhythm: Normal rate.   Pulmonary:      Effort: Pulmonary effort is normal. No respiratory distress.   Chest:      Comments: Right sided chest tube in place    Abdominal:      General: Abdomen is flat. Bowel sounds are normal. There is distension.      Palpations: Abdomen is soft.      Comments: Distension same as yesterday, abdomen is soft   Musculoskeletal:         General: Normal range of motion.      Cervical back: Normal range of motion.   Skin:     General: Skin is warm and dry.   Neurological:      General: No focal deficit present.      Motor: Weakness present.         Last Recorded Vitals  Blood pressure 148/72, pulse 64, temperature 36.4 °C (97.5 °F), temperature source Oral, resp. rate 18, height 1.626 m (5' 4.02\"), weight 62.8 kg (138 lb 7.2 oz), SpO2 95%.  Intake/Output last 3 Shifts:  I/O last 3 completed shifts:  In: 100 (1.6 mL/kg) [P.O.:100]  Out: 1630 (26 mL/kg) [Urine:1600 (0.7 mL/kg/hr); Chest Tube:30]  Weight: 62.8 kg     Relevant Results  Lab Results   Component Value Date    WBC 10.6 05/08/2024    HGB 9.6 (L) 05/08/2024    HCT 30.2 (L) 05/08/2024    MCV 98 05/08/2024     05/08/2024     Lab Results   Component Value Date    GLUCOSE 69 05/08/2024    CALCIUM 8.7 05/08/2024     05/08/2024    K 3.7 05/08/2024    CO2 23 (L) 05/08/2024     05/08/2024    BUN 65 (H) 05/08/2024    CREATININE 1.10 " 05/08/2024       Assessment/Plan   Principal Problem:    Pneumothorax    5/8: Chest tube placed to waterseal yesterday. CXR 2 views to be completed this morning. If no hemothorax/pneumothorax visualized on imaging, tube to be pulled today. Repeat imaging tomorrow morning.     Addendum 1425: Chest tube pulled. Patient tolerated procedure without pain or difficulty. Repeat imaging ordered for tomorrow morning.     5/7: Chest tube placed to waterseal this morning will obtain 2 view chest x-ray this morning to evaluate hemothorax, pneumothorax.  Patient maintaining O2 saturation at 97% on 2 L.  Creatinine improved today and urine output improved.  Planning on stopping IV fluids tomorrow per nephrology.  Coumadin restarted, bridging with Lovenox.   I had a long discussion with patient's daughter today regarding plans for discharge.  The daughter states that patient will refuse nursing facility and in the past has signed herself out after 2 days.  She wants the patient to be discharged home, I explained my concerns with patient discharging home after recent pacemaker being placed and a recent fall.  Daughter reports that the patient will not be alone because either her or her  will be present.  Daughter is concerned because she feels the patient is steadily declining while inpatient.  We have stopped narcotics.  Encourage out of bed when possible and continue working with therapy.  Patient is moving her bowels without issues.   If patient and daughter do refuse skilled nursing facility, would like to get home health care set up. Will follow-up on chest x-ray results and see if we can get the chest tube removed today.    5/6: Chest xray ordered for this morning. If no pneumo, will plan to remove CT today. Continue with PT/OT. Nephrology consulted for [facility]. OK to restart coumadin    89 yo woman s/p fall with right sided rib fractures and PTX s/p pigtail chest tube placement  - Chest tube to water seal  - Repeat xray  in morning, if no PTX, then chest tube can be removed  - PT/OT  - Patient and daughter would prefer that she discharge to home         I spent 45 minutes in the professional and overall care of this patient.      DARBY DealC

## 2024-05-08 NOTE — PROGRESS NOTES
Occupational Therapy    OT Treatment    Patient Name: Audelia Virgen  MRN: [id number]  Today's Date: 5/8/2024  Time Calculation  Start Time: 1530  Stop Time: 1553  Time Calculation (min): 23 min         Assessment:  OT Assessment: Client is lethargic today. Needed alot of encouragement to participate in treatment.  Demonstrates increased transfer ability and sitting ability.  Continues to have acute OT needs.  Prognosis: Good  Barriers to Discharge: None  Evaluation/Treatment Tolerance: Patient limited by fatigue  Medical Staff Made Aware: Yes  End of Session Communication: Bedside nurse  End of Session Patient Position: Up in chair, Alarm on    Plan:  Treatment Interventions: ADL retraining, Functional transfer training, UE strengthening/ROM, Endurance training, Patient/family training, Neuromuscular reeducation, Compensatory technique education  OT Frequency: 5 times per week  OT Discharge Recommendations: Moderate intensity level of continued care  Equipment Recommended upon Discharge: Wheeled walker  OT Recommended Transfer Status: Assist of 2, Maximum assist  OT - OK to Discharge: Yes  Treatment Interventions: ADL retraining, Functional transfer training, UE strengthening/ROM, Endurance training, Patient/family training, Neuromuscular reeducation, Compensatory technique education    Subjective   Previous Visit Info:  OT Last Visit  OT Received On: 05/08/24  General:  General  Family/Caregiver Present: Yes  Caregiver Feedback: kellen is present and provides information regarding patient's lack of activity today  Prior to Session Communication: Bedside nurse  Patient Position Received: Bed, 3 rail up    Precautions: falls precautions       Pain:  Pain Assessment  Pain Assessment: 0-10  Pain Score: 0 - No pain    Objective       Bed Mobility/Transfers: Bed Mobility  Bed Mobility: Yes  Bed Mobility 1  Bed Mobility 1: Supine to sitting  Level of Assistance 1: Moderate assistance    Transfers  Transfer:  Yes  Transfer 1  Transfer From 1: Bed to  Transfer to 1: Chair with arms  Technique 1: Sit to stand  Transfer Level of Assistance 1: Moderate assistance    Sitting Balance:  Static Sitting Balance  Static Sitting-Balance Support: Feet supported  Static Sitting-Level of Assistance: Close supervision  Standing Balance:  Static Standing Balance  Static Standing-Balance Support: No upper extremity supported  Static Standing-Level of Assistance: Moderate assistance     Therapy/Activity: Therapeutic Exercise  Therapeutic Exercise Performed: Yes  Therapeutic Exercise Activity 1: performed BUE exercises (elbow flex/ext, shoulder flex/ext, and hand flex/ext) 2 sets 10 reps no resistance        Goals:    Problem: Functional Balance  Goal: STG - Maintains static sitting balance with upper extremity support and close supervision during functional activity  Description: I  Outcome: Progressing     Problem: Bathing  Goal: STG - Patient will bathe upper body with close vsupervision  Outcome: Progressing     Problem: Dressing Upper Extremities  Goal: LTG - Patient will complete upper body dressing with close supervision.  Outcome: Progressing     Problem: Eating  Goal: LTG - Patient will feed self independently  Outcome: Progressing     Problem: Grooming  Goal: STG - Patient will perform  grooming independent with set up  Outcome: Progressing     Problem: Toileting  Goal: STG - Patient will complete toileting tasks with close supervision and 2ww  Outcome: Progressing     Problem: OT Transfers  Goal: LTG - Patient will transfer from one surface to another with close supervision and 2ww  Outcome: Progressing

## 2024-05-08 NOTE — CARE PLAN
Problem: Pain - Adult  Goal: Verbalizes/displays adequate comfort level or baseline comfort level  5/8/2024 0150 by Katie Montiel RN  Outcome: Progressing  5/8/2024 0150 by Katie Montiel RN  Outcome: Progressing     Problem: Safety - Adult  Goal: Free from fall injury  5/8/2024 0150 by Katie Montiel RN  Outcome: Progressing  5/8/2024 0150 by Katie Montiel RN  Outcome: Progressing     Problem: Discharge Planning  Goal: Discharge to home or other facility with appropriate resources  5/8/2024 0150 by Katie Montiel RN  Outcome: Progressing  5/8/2024 0150 by Katie Montiel RN  Outcome: Progressing     Problem: Chronic Conditions and Co-morbidities  Goal: Patient's chronic conditions and co-morbidity symptoms are monitored and maintained or improved  5/8/2024 0150 by Katie Montiel RN  Outcome: Progressing  5/8/2024 0150 by Katie Montiel RN  Outcome: Progressing     Problem: Fall/Injury  Goal: Not fall by end of shift  5/8/2024 0150 by Katie Montiel RN  Outcome: Progressing  5/8/2024 0150 by Katie Montiel RN  Outcome: Progressing  Goal: Be free from injury by end of the shift  5/8/2024 0150 by Katie Montiel RN  Outcome: Progressing  5/8/2024 0150 by Katie Montiel RN  Outcome: Progressing  Goal: Verbalize understanding of personal risk factors for fall in the hospital  5/8/2024 0150 by Katie Montiel RN  Outcome: Progressing  5/8/2024 0150 by Katie Montiel RN  Outcome: Progressing  Goal: Verbalize understanding of risk factor reduction measures to prevent injury from fall in the home  5/8/2024 0150 by Katie Montiel, RN  Outcome: Progressing  5/8/2024 0150 by Katie Montiel RN  Outcome: Progressing  Goal: Pace activities to prevent fatigue by end of the shift  5/8/2024 0150 by Katie Montiel RN  Outcome: Progressing  5/8/2024 0150 by Katie Montiel,  RN  Outcome: Progressing     Problem: Skin  Goal: Decreased wound size/increased tissue granulation at next dressing change  5/8/2024 0150 by Katie Montiel RN  Outcome: Progressing  Flowsheets (Taken 5/8/2024 0150)  Decreased wound size/increased tissue granulation at next dressing change:   Promote sleep for wound healing   Protective dressings over bony prominences  5/8/2024 0150 by Katie Montiel RN  Outcome: Progressing  Flowsheets (Taken 5/8/2024 0150)  Decreased wound size/increased tissue granulation at next dressing change:   Promote sleep for wound healing   Protective dressings over bony prominences  Goal: Participates in plan/prevention/treatment measures  5/8/2024 0150 by Katie Montiel RN  Outcome: Progressing  Flowsheets (Taken 5/7/2024 1945)  Participates in plan/prevention/treatment measures: Discuss with provider PT/OT consult  5/8/2024 0150 by Katie Montiel RN  Outcome: Progressing  Flowsheets (Taken 5/7/2024 1945)  Participates in plan/prevention/treatment measures: Discuss with provider PT/OT consult  Goal: Prevent/manage excess moisture  5/8/2024 0150 by Katie Montiel RN  Outcome: Progressing  Flowsheets (Taken 5/7/2024 1945)  Prevent/manage excess moisture:   Cleanse incontinence/protect with barrier cream   Moisturize dry skin  5/8/2024 0150 by Katie Montiel RN  Outcome: Progressing  Flowsheets (Taken 5/7/2024 1945)  Prevent/manage excess moisture:   Cleanse incontinence/protect with barrier cream   Moisturize dry skin  Goal: Prevent/minimize sheer/friction injuries  5/8/2024 0150 by Katie Montiel RN  Outcome: Progressing  Flowsheets (Taken 5/8/2024 0150)  Prevent/minimize sheer/friction injuries:   Use pull sheet   HOB 30 degrees or less   Turn/reposition every 2 hours/use positioning/transfer devices  5/8/2024 0150 by Katie Montiel RN  Outcome: Progressing  Flowsheets (Taken 5/8/2024 0150)  Prevent/minimize  sheer/friction injuries:   Use pull sheet   HOB 30 degrees or less   Turn/reposition every 2 hours/use positioning/transfer devices  Goal: Promote/optimize nutrition  5/8/2024 0150 by Katie Montiel RN  Outcome: Progressing  Flowsheets (Taken 5/7/2024 1945)  Promote/optimize nutrition: Monitor/record intake including meals  5/8/2024 0150 by Katie Montiel RN  Outcome: Progressing  Flowsheets (Taken 5/7/2024 1945)  Promote/optimize nutrition: Monitor/record intake including meals  Goal: Promote skin healing  5/8/2024 0150 by Katie Montiel RN  Outcome: Progressing  Flowsheets (Taken 5/7/2024 1945)  Promote skin healing:   Assess skin/pad under line(s)/device(s)   Protective dressings over bony prominences   Turn/reposition every 2 hours/use positioning/transfer devices  5/8/2024 0150 by Katie Montiel RN  Outcome: Progressing  Flowsheets (Taken 5/7/2024 1945)  Promote skin healing:   Assess skin/pad under line(s)/device(s)   Protective dressings over bony prominences   Turn/reposition every 2 hours/use positioning/transfer devices     Problem: Pain  Goal: Takes deep breaths with improved pain control throughout the shift  5/8/2024 0150 by Katie Montiel RN  Outcome: Progressing  5/8/2024 0150 by Katie Montiel RN  Outcome: Progressing  Goal: Turns in bed with improved pain control throughout the shift  5/8/2024 0150 by Katie Montiel RN  Outcome: Progressing  5/8/2024 0150 by Katie Montiel RN  Outcome: Progressing  Goal: Walks with improved pain control throughout the shift  5/8/2024 0150 by Katie Montiel RN  Outcome: Progressing  5/8/2024 0150 by Katie Montiel RN  Outcome: Progressing  Goal: Performs ADL's with improved pain control throughout shift  5/8/2024 0150 by Katie Montiel RN  Outcome: Progressing  5/8/2024 0150 by Katie Montiel RN  Outcome: Progressing  Goal: Participates in PT with improved pain  control throughout the shift  5/8/2024 0150 by Katie Montiel, KANU  Outcome: Progressing  5/8/2024 0150 by Katie Montiel RN  Outcome: Progressing  Goal: Free from opioid side effects throughout the shift  5/8/2024 0150 by Katie Montiel, RN  Outcome: Progressing  5/8/2024 0150 by Katie Montiel, RN  Outcome: Progressing  Goal: Free from acute confusion related to pain meds throughout the shift  5/8/2024 0150 by Katie Montiel, RN  Outcome: Progressing  5/8/2024 0150 by Katie Montiel, RN  Outcome: Progressing     Problem: Pain  Goal: STG - Patients pain is managed to allow active participation in daily activities  5/8/2024 0150 by Katie Montiel, RN  Outcome: Progressing  5/8/2024 0150 by Katie Montiel, RN  Outcome: Progressing     Problem: Bathing  Goal: STG - Patient will bathe upper body with close vsupervision  5/8/2024 0150 by Katie Montiel, RN  Outcome: Progressing  5/8/2024 0150 by Katie Montiel RN  Outcome: Progressing     Problem: Dressing Upper Extremities  Goal: LTG - Patient will complete upper body dressing with close supervision.  5/8/2024 0150 by Katie Montiel, RN  Outcome: Progressing  5/8/2024 0150 by Katie Montiel, RN  Outcome: Progressing     Problem: Eating  Goal: LTG - Patient will feed self independently  5/8/2024 0150 by Katie Montiel, RN  Outcome: Progressing  5/8/2024 0150 by Katie Montiel RN  Outcome: Progressing     Problem: Grooming  Goal: STG - Patient will perform  grooming independent with set up  5/8/2024 0150 by Katie Montiel, RN  Outcome: Progressing  5/8/2024 0150 by Katie Montiel RN  Outcome: Progressing     Problem: Toileting  Goal: STG - Patient will complete toileting tasks with close supervision and 2ww  5/8/2024 0150 by Katie Montiel, RN  Outcome: Progressing  5/8/2024 0150 by Katie Montiel, KANU  Outcome: Progressing    The patient's goals for the shift include      The clinical goals for the shift include removal of chest tube, increased physical activity    Over the shift, the patient did not make progress toward the following goals. Barriers to progression include . Recommendations to address these barriers include .

## 2024-05-08 NOTE — CARE PLAN
The patient's goals for the shift include  go home    The clinical goals for the shift include VSS

## 2024-05-08 NOTE — NURSING NOTE
Assumed care of pt.  Bedside report received.  Pt in bed resting.   Call light and patient belongings within reach.

## 2024-05-08 NOTE — PROGRESS NOTES
Physical Therapy                 Therapy Communication Note    Patient Name: Audelia Virgen  MRN: [id number]  Today's Date: 5/8/2024     Discipline: Physical Therapy    Missed Visit Reason: Missed Visit Reason: Patient refused (Attempted to see pt for PT f/u; pt with c/o pain in R shoulder and leg (RN aware); not agreeable to participate in therapy at this time despite max encouragement/education on benefits of participation.)    Missed Time: Attempt    Comment:

## 2024-05-08 NOTE — PROGRESS NOTES
"Audelia Virgen is a 89 y.o. female on day 4 of admission presenting with Pneumothorax.      Subjective   Patient seen and examined.  Nursing bedside.  Overall to me looks better today, also with improved mentation today.  Patient still reports feeling \"weak\".  States she cannot move her right leg but during my assessment she could indeed move her right leg quite well.  Denies chest pain or shortness of breath.  No acute distress.       Objective     Last Recorded Vitals  /72 (BP Location: Right arm, Patient Position: Lying)   Pulse 64   Temp 36.4 °C (97.5 °F) (Oral)   Resp 18   Wt 62.8 kg (138 lb 7.2 oz)   SpO2 95%   Intake/Output last 3 Shifts:    Intake/Output Summary (Last 24 hours) at 5/8/2024 1003  Last data filed at 5/8/2024 0932  Gross per 24 hour   Intake 100 ml   Output 1700 ml   Net -1600 ml       Admission Weight  Weight: 58.7 kg (129 lb 6.6 oz) (05/04/24 0751)    Daily Weight  05/08/24 : 62.8 kg (138 lb 7.2 oz)    Image Results  CT chest abdomen pelvis wo IV contrast  Narrative: Interpreted By:  Mariana Jones,   STUDY:  CT CHEST ABDOMEN PELVIS WO CONTRAST;  5/6/2024 10:06 am      INDICATION:  Signs/Symptoms:s/p rib fractures with pneumothorax.      COMPARISON:  CT chest done on 05/04/2024      ACCESSION NUMBER(S):  [id number]      ORDERING CLINICIAN:  RUCHI MENDOZA      TECHNIQUE:  CT of the chest, abdomen and pelvis was performed. Contiguous axial  images were obtained at 3 mm slice thickness through the chest,  abdomen and pelvis. Coronal and sagittal reconstructions at 3 mm  slice thickness were performed.  No intravenous contrast was  administered; positive oral contrast was given.          FINDINGS:  Please note that the study is limited without intravenous contrast.      CHEST:          LUNG/PLEURA/LARGE AIRWAYS:  A right-sided pigtail thoracostomy tube is satisfactorily positioned  within the anterior pleural space with small right hemopneumothorax  estimated to comprise 10% of the " volume of the right hemithorax. The  right hemopneumothorax has decreased in size since the prior study.  There is a 5 cm area of ground-glass density within the anterior  segment of right upper lobe with central airspace consolidation  measuring 2.2 cm in size. This is most likely related to pulmonary  contusion and hemorrhage within the anterior segment of right upper  lobe. There are stable tiny juxtapleural nodules along the right  minor fissure. Compressive atelectasis is present posteriorly in the  right lower lobe and has increased since the prior examination. There  is also some airspace consolidation seen medially in the left lower  lobe in a paraspinous location. There is a trace amount of left  pleural effusion.      HEART:  Cardiomegaly is observed with pacemaker lead identified within region  of the interventricular septum. Coronary artery calcification is  present.      MEDIASTINUM AND SHALA:  No mediastinal or hilar lymphadenopathy or mass is seen. No  aneurysmal dilatation of the thoracic aorta is seen.      CHEST WALL AND LOWER NECK:  There is a small amount of subcutaneous emphysema within the right  chest wall appearing similar when compared with prior study. There  are displaced acute fractures of the right 7th, 8th, and 9th ribs  seen.      ABDOMEN:      LIVER:  Unremarkable      BILE DUCTS:  No intrahepatic or extrahepatic biliary ductal dilatation is seen.      GALLBLADDER:  Cholelithiasis is present without evidence of cholecystitis.      PANCREAS:  The pancreas is atrophic.      SPLEEN:  Normal. Incidental note is made of splenic artery calcification.      ADRENAL GLANDS:  No adrenal abnormality is seen.      KIDNEYS AND URETERS:  Unremarkable bilaterally.      PELVIS:      BLADDER:  Morales catheter is satisfactorily positioned within decompressed  urinary bladder.      REPRODUCTIVE ORGANS:  No pelvic mass is seen.      BOWEL:  There are some colonic diverticula observed without evidence  for  diverticulitis. There is no abnormal bowel wall thickening  identified. No abnormal distention of the intestinal tract is seen.      VESSELS:  There is atherosclerosis of the abdominal aorta and iliac arteries  without aneurysmal dilatation.      PERITONEUM/RETROPERITONEUM/LYMPH NODES:  No free fluid or free intraperitoneal air is observed. There are  scattered normal-sized mesenteric lymph nodes identified.      ABDOMINAL WALL:  There is no hematoma of the abdominal wall musculature. No hernia is  identified.      BONES:  There is mild lumbar levoscoliosis and mild thoracic dextroscoliosis.  A grade 1 spondylolisthesis of L4 on L5 is noted. No fracture of the  thoracic, lumbar, or sacral spines is seen. No pelvic fracture is  observed.      Impression: Chest  1.  Interval placement of a pigtail thoracostomy tube on the right  with overall decreased size of right hemopneumothorax. The  pneumothorax is smaller whereas the hemothorax is a little larger.  Subcutaneous emphysema on the right persists.  2. Ground-glass opacity within the anterior segment of right upper  lobe with associated airspace consolidation centrally most likely due  to pulmonary parenchymal contusion and hemorrhage.  3. Acute displaced fractures of the right 7th through 9th ribs.  4. Airspace consolidation in medial left lower lobe in a paraspinous  location with trace left pleural effusion. Compressive atelectasis in  the right lower lobe has increased since prior study.      Abdomen-Pelvis  1.  Cholelithiasis.  2. Diverticulosis of the colon.  3. Decompressed urinary bladder with Morales catheter satisfactorily  positioned.              MACRO:  None      Signed by: Mariana Jones 5/6/2024 12:29 PM  Dictation workstation:   [id number]  XR chest 2 views  Narrative: Interpreted By:  Mariana Jones,   STUDY:  XR CHEST 2 VIEWS 5/6/2024 10:21 am      INDICATION:  Signs/Symptoms:Pneumothorax      COMPARISON:  05/05/2024      ACCESSION  NUMBER(S):  [id number]      ORDERING CLINICIAN:  RUCHI MENDOZA      TECHNIQUE:  AP erect and lateral views of the chest      FINDINGS:  The right sided pigtail thoracostomy tube is re-identified. There is  right small hemopneumothorax with right apical pneumothorax estimated  to comprise approximately 5% of the volume of the right hemithorax.      There are acute displaced right rib fractures seen.      There is stable enlargement of cardiac silhouette with unipolar  pacemaker lead in right ventricle. There are surgical clips in the  left axilla with postoperative change from left mastectomy.      There is some discoid atelectasis at the right lung base      Impression: Right hemopneumothorax with right apical pneumothorax estimated  comprise approximately 5% of the volume of right hemithorax. Right  basilar discoid atelectasis.      Signed by: Mariana Jones 5/6/2024 10:51 AM  Dictation workstation:   [id number]      Physical Exam  Vitals and nursing note reviewed.   Constitutional:       Appearance: Normal appearance.      Comments: Ill-appearing   HENT:      Head: Normocephalic and atraumatic.      Mouth/Throat:      Mouth: Mucous membranes are moist.   Eyes:      Extraocular Movements: Extraocular movements intact.      Pupils: Pupils are equal, round, and reactive to light.   Cardiovascular:      Rate and Rhythm: Normal rate. Rhythm irregular.      Pulses: Normal pulses.      Heart sounds: Normal heart sounds.   Pulmonary:      Breath sounds: Normal breath sounds.      Comments: 2 L of oxygen via nasal cannula  Abdominal:      General: There is distension.      Tenderness: There is abdominal tenderness.   Musculoskeletal:         General: Normal range of motion.      Cervical back: Normal range of motion.      Comments: No edema, no erythema no tenderness, range of motion normal   Skin:     General: Skin is warm and dry.      Capillary Refill: Capillary refill takes less than 2 seconds.   Neurological:       General: No focal deficit present.      Mental Status: She is alert and oriented to person, place, and time.      Comments: Mentation improved from previous assessment   Psychiatric:         Mood and Affect: Mood normal.         Relevant Results             Results for orders placed or performed during the hospital encounter of 05/04/24 (from the past 24 hour(s))   POCT GLUCOSE   Result Value Ref Range    POCT Glucose 92 74 - 99 mg/dL   POCT GLUCOSE   Result Value Ref Range    POCT Glucose 72 (L) 74 - 99 mg/dL   POCT GLUCOSE   Result Value Ref Range    POCT Glucose 72 (L) 74 - 99 mg/dL   CBC and Auto Differential   Result Value Ref Range    WBC 10.6 4.4 - 11.3 x10*3/uL    nRBC 0.0 0.0 - 0.0 /100 WBCs    RBC 3.09 (L) 4.00 - 5.20 x10*6/uL    Hemoglobin 9.6 (L) 12.0 - 16.0 g/dL    Hematocrit 30.2 (L) 36.0 - 46.0 %    MCV 98 80 - 100 fL    MCH 31.1 26.0 - 34.0 pg    MCHC 31.8 (L) 32.0 - 36.0 g/dL    RDW 15.0 (H) 11.5 - 14.5 %    Platelets 150 150 - 450 x10*3/uL    Neutrophils % 85.3 40.0 - 80.0 %    Immature Granulocytes %, Automated 0.7 0.0 - 0.9 %    Lymphocytes % 6.9 13.0 - 44.0 %    Monocytes % 6.1 2.0 - 10.0 %    Eosinophils % 0.7 0.0 - 6.0 %    Basophils % 0.3 0.0 - 2.0 %    Neutrophils Absolute 9.02 (H) 1.60 - 5.50 x10*3/uL    Immature Granulocytes Absolute, Automated 0.07 0.00 - 0.50 x10*3/uL    Lymphocytes Absolute 0.73 (L) 0.80 - 3.00 x10*3/uL    Monocytes Absolute 0.64 0.05 - 0.80 x10*3/uL    Eosinophils Absolute 0.07 0.00 - 0.40 x10*3/uL    Basophils Absolute 0.03 0.00 - 0.10 x10*3/uL   Renal Function Panel   Result Value Ref Range    Glucose 69 65 - 99 mg/dL    Sodium 138 133 - 145 mmol/L    Potassium 3.7 3.4 - 5.1 mmol/L    Chloride 102 97 - 107 mmol/L    Bicarbonate 23 (L) 24 - 31 mmol/L    Urea Nitrogen 65 (H) 8 - 25 mg/dL    Creatinine 1.10 0.40 - 1.60 mg/dL    eGFR 47 (L) >60 mL/min/1.73m*2    Calcium 8.7 8.5 - 10.4 mg/dL    Phosphorus 3.3 2.5 - 4.5 mg/dL    Albumin 3.0 (L) 3.5 - 5.0 g/dL    Anion Gap  13 <=19 mmol/L   Protime-INR   Result Value Ref Range    Protime 14.4 (H) 9.3 - 12.7 seconds    INR 1.4 (H) 0.9 - 1.2   POCT GLUCOSE   Result Value Ref Range    POCT Glucose 72 (L) 74 - 99 mg/dL    XR chest 1 view    Result Date: 5/5/2024  Interpreted By:  Santos Faria, STUDY: XR CHEST 1 VIEW  5/5/2024 1:41 pm   INDICATION: Signs/Symptoms:Chest pain   COMPARISON: 05/05/2024 at 5:55 a.m.   ACCESSION NUMBER(S): [id number]   ORDERING CLINICIAN: FLAQUITA LEE   TECHNIQUE: A single AP portable radiograph of the chest was obtained.   FINDINGS: Multiple cardiac monitoring leads are seen over the chest.  A single lead pacer is seen over the right chest. The right-sided chest tube is unchanged in position. Bilateral basilar airspace consolidations are seen and may represent small pleural effusions, atelectasis and/or pneumonia. No pneumothorax is identified. The cardiac silhouette is enlarged, though not significantly changed.       No significant interval change in the appearance of the chest, with findings as above.   MACRO: None.   Signed by: Santos Faria 5/5/2024 1:43 PM Dictation workstation:   [id number]    XR chest 1 view    Result Date: 5/5/2024  Interpreted By:  Genna Lara, STUDY: XR CHEST 1 VIEW 5/5/2024 6:14 am   INDICATION: Signs/Symptoms:eval pneumothorax   COMPARISON: 05/04/2024   ACCESSION NUMBER(S): [id number]   ORDERING CLINICIAN: SHANNON HERNÁNDEZ   TECHNIQUE: AP view   FINDINGS: Pacemaker overlies the right chest wall with a single transvenous lead overlying the right ventricle. Pigtail catheter seen in the right upper chest unchanged since the prior exam. Lungs are hyperinflated typical of COPD there are suspected small bilateral pleural effusions. Slight pulmonary venous congestion noted with an enlarged cardiac silhouette. Bilateral lower lobe airspace opacification may be due to atelectasis or infiltrates. Surgical clips are seen in the left axilla.       Signs of CHF, COPD, small bilateral  pleural effusions and bilateral lower lobe atelectasis or infiltrates unchanged since the prior exam   Signed by: Genna Lara 5/5/2024 12:58 PM Dictation workstation:   [id number]    XR chest 1 view    Result Date: 5/4/2024  Interpreted By:  Finkelstein, Evan, STUDY: XR CHEST 1 VIEW;  5/4/2024 4:24 am   INDICATION: Signs/Symptoms:Status post chest tube insertion.   COMPARISON: Chest radiograph 05/04/2024   ACCESSION NUMBER(S): [id number]   ORDERING CLINICIAN: SHANDRA MARTINEZ   FINDINGS: Right chest wall pacemaker present. Interval placement of a right-sided pigtail catheter   CARDIOMEDIASTINAL SILHOUETTE: Enlarged cardiac silhouette, similar compared to prior imaging. Calcifications overlie the aortic arch.   LUNGS: There are patchy bibasilar airspace opacities. Persistent small right pneumothorax.   ABDOMEN: No remarkable upper abdominal findings.   BONES: Mildly displaced fractures of the right 7th and 8th ribs.       Interval right pigtail catheter placement. Persistent small right pneumothorax. Bibasilar airspace opacities, which may represent areas of atelectasis with pulmonary contusions not excluded in the setting of trauma. Mildly displaced fractures of the right 7th and 8th ribs.   MACRO: None.   Signed by: Evan Finkelstein 5/4/2024 4:52 AM Dictation workstation:   [id number]    CT chest wo IV contrast    Result Date: 5/4/2024  Interpreted By:  Rigoberto Varma, STUDY: CT CHEST WO IV CONTRAST;  5/4/2024 3:12 am   INDICATION: Signs/Symptoms:Trauma.   COMPARISON: None.   ACCESSION NUMBER(S): [id number]   ORDERING CLINICIAN: ALLYN HUMPHREY   TECHNIQUE: Helical data acquisition of the chest was obtained  without IV contrast material.  Images were reformatted in axial, coronal, and sagittal planes.   FINDINGS: LUNGS AND AIRWAYS: The trachea and central airways are patent. No endobronchial lesion.   There is a small to moderate-sized right pneumothorax the occupies probably between 10 and 20 % of the volume of  the right pleural cavity. Trace right hemothorax. No pleural effusion or pneumothorax on the left.   There is mild asymmetric ground-glass opacity in the right lower lobe with mild subpleural consolidative opacity, which probably relates to atelectasis, possibly with a component of pulmonary contusion. Platelike and reticular opacity in the left lower lung probably relates to atelectasis or scarring. No focal consolidation.   There is a background of pulmonary interstitial prominence with mild smooth interlobular septal thickening in the could relate to chronic changes with or without acute pulmonary interstitial edema/CHF.   MEDIASTINUM AND SHALA, LOWER NECK AND AXILLA: The visualized thyroid gland is within normal limits.   No pathologically enlarged lymph nodes.   Fluid in the esophagus is compatible with gastroesophageal reflux. Circumferential mural thickening of the distal esophagus with submucosal edema compatible with esophagitis; underlying neoplasm is not excluded. Recommend endoscopic correlation or further evaluation nonemergent outpatient fluoroscopic barium swallow exam. A yellow alert was sent.   HEART AND VESSELS: The thoracic aorta is of caliber and tortuous with moderate vascular calcifications.   Main pulmonary artery and its branches are normal in caliber.   Coronary artery calcifications are seen. The study is not optimized for evaluation of coronary arteries.   Heart is markedly enlarged. Pacing leads in the right atrium and right ventricle.   No evidence of pericardial effusion.   UPPER ABDOMEN: The visualized subdiaphragmatic structures demonstrate no remarkable findings.   CHEST WALL AND OSSEOUS STRUCTURES: Acute displaced fractures of the lateral right 7th through 9th ribs. Right lateral lower chest wall subcutaneous emphysema that extends inferiorly beyond the extent of imaging. Right upper anterior chest wall pacing generator.       Acute displaced fractures of the lateral right 7th  through 9th ribs. Right lateral lower chest wall subcutaneous emphysema that extends inferiorly beyond the extent of imaging.   There is a small to moderate-sized right pneumothorax the occupies probably between 10 and 20 % of the volume of the right pleural cavity. Trace right hemothorax. No pleural effusion or pneumothorax on the left.   There is mild asymmetric ground-glass opacity in the right lower lobe with mild subpleural consolidative opacity, which probably relates to atelectasis, possibly with a component of pulmonary contusion. Platelike and reticular opacity in the left lower lung probably relates to atelectasis or scarring. No focal consolidation.   There is a background of pulmonary interstitial prominence with mild smooth interlobular septal thickening in the could relate to chronic changes with or without acute pulmonary interstitial edema/CHF. Atherosclerosis, including coronary artery disease.   Marked cardiomegaly.   Circumferential mural thickening of the distal esophagus with submucosal edema compatible with esophagitis; underlying neoplasm is not excluded. Recommend endoscopic correlation or further evaluation nonemergent outpatient fluoroscopic barium swallow exam. A yellow alert was sent.   MACRO: Critical Finding:  See findings. Notification was initiated on 5/4/2024 at 3:33 am by  Rigoberto Varma.  (**-YCF-**) Instructions:   Signed by: Rigoberto Varma 5/4/2024 3:33 AM Dictation workstation:   [id number]    CT head wo IV contrast    Result Date: 5/4/2024  Interpreted By:  Rigoberto Varma, STUDY: CT HEAD WO IV CONTRAST; CT CERVICAL SPINE WO IV CONTRAST; ;  5/4/2024 3:11 am   INDICATION: Signs/Symptoms:Trauma.   COMPARISON: None.   ACCESSION NUMBER(S): [id number]; [id number]   ORDERING CLINICIAN: SHANDRA MARTINEZ   TECHNIQUE: Noncontrast CT exams of the head and cervical spine with multiplanar reformations.   FINDINGS:   BRAIN PARENCHYMA: Advanced volume loss.  There is periventricular and  subcortical white matter hypoattenuation, most in keeping with chronic microvascular ischemic change.  Gray-white matter interfaces are preserved. No mass, mass effect or midline shift.   HEMORRHAGE: No acute intracranial hemorrhage. VENTRICLES and EXTRA-AXIAL SPACES: Normal size. EXTRACRANIAL SOFT TISSUES: Within normal limits. PARANASAL SINUSES/MASTOIDS: The visualized paranasal sinuses and mastoid air cells are aerated. Mucous retention cyst in the left maxillary sinus. CALVARIUM: No depressed skull fracture. No destructive osseous lesion.   OTHER FINDINGS: None.   CERVICAL SPINE:   ALIGNMENT: Grade 1 degenerative anterolisthesis at C7-T1 and T1-T2. Alignment is otherwise normal. VERTEBRAE: No acute fracture. Vertebral stature is maintained. Endplate osteophytosis and uncovertebral hypertrophy in conjunction with moderate to severe disc height loss, worst at C6-C7. Moderate to severe multilevel hypertrophic facet osteoarthropathy. SPINAL CANAL: No critical spinal canal stenosis. PREVERTEBRAL SOFT TISSUES: No prevertebral soft tissue swelling. LUNG APICES: Partially imaged right pneumothorax.   OTHER FINDINGS: None.       No evidence of acute intracranial abnormality.   No acute cervical spine fracture or malalignment.   Partially imaged right pneumothorax.     MACRO: None   Signed by: Rigoberto Varma 5/4/2024 3:24 AM Dictation workstation:   [id number]    CT cervical spine wo IV contrast    Result Date: 5/4/2024  Interpreted By:  Rigoberto Varma, STUDY: CT HEAD WO IV CONTRAST; CT CERVICAL SPINE WO IV CONTRAST; ;  5/4/2024 3:11 am   INDICATION: Signs/Symptoms:Trauma.   COMPARISON: None.   ACCESSION NUMBER(S): [id number]; [id number]   ORDERING CLINICIAN: SHANDRA MARTINEZ   TECHNIQUE: Noncontrast CT exams of the head and cervical spine with multiplanar reformations.   FINDINGS:   BRAIN PARENCHYMA: Advanced volume loss.  There is periventricular and subcortical white matter hypoattenuation, most in keeping with chronic  microvascular ischemic change.  Gray-white matter interfaces are preserved. No mass, mass effect or midline shift.   HEMORRHAGE: No acute intracranial hemorrhage. VENTRICLES and EXTRA-AXIAL SPACES: Normal size. EXTRACRANIAL SOFT TISSUES: Within normal limits. PARANASAL SINUSES/MASTOIDS: The visualized paranasal sinuses and mastoid air cells are aerated. Mucous retention cyst in the left maxillary sinus. CALVARIUM: No depressed skull fracture. No destructive osseous lesion.   OTHER FINDINGS: None.   CERVICAL SPINE:   ALIGNMENT: Grade 1 degenerative anterolisthesis at C7-T1 and T1-T2. Alignment is otherwise normal. VERTEBRAE: No acute fracture. Vertebral stature is maintained. Endplate osteophytosis and uncovertebral hypertrophy in conjunction with moderate to severe disc height loss, worst at C6-C7. Moderate to severe multilevel hypertrophic facet osteoarthropathy. SPINAL CANAL: No critical spinal canal stenosis. PREVERTEBRAL SOFT TISSUES: No prevertebral soft tissue swelling. LUNG APICES: Partially imaged right pneumothorax.   OTHER FINDINGS: None.       No evidence of acute intracranial abnormality.   No acute cervical spine fracture or malalignment.   Partially imaged right pneumothorax.     MACRO: None   Signed by: Rigoberto Varma 5/4/2024 3:24 AM Dictation workstation:   [id number]    XR ribs right 2 views w chest pa or ap    Result Date: 5/4/2024  Interpreted By:  Polly Weinberg, STUDY: XR RIBS RIGHT 2 VIEWS WITH CHEST PA OR AP;  5/4/2024 2:31 am   INDICATION: Signs/Symptoms:Trauma.   COMPARISON: None.   ACCESSION NUMBER(S): [id number]   ORDERING CLINICIAN: SHANDRA MARTINEZ   FINDINGS: Right-sided single lead pacer is present with lead overlying the right ventricle. Multiple surgical clips overlie the left axilla   CARDIOMEDIASTINAL SILHOUETTE: Cardiac silhouette is enlarged. Mild interstitial prominence. Atherosclerotic calcification of the aorta.   LUNGS: There is a subtle linear density along the right lateral  chest wall adjacent to site of fractures described above. Tiny right-sided pneumothorax can not be excluded. Right basilar atelectasis. No sizeable effusion.   ABDOMEN: No remarkable upper abdominal findings.   BONES: Generalized diffuse osteopenia. There are acute mildly displaced fractures of the right 7th, 8th and 9th ribs posterolaterally. Multilevel degenerative changes of the spine.       Cardiomegaly with mild interstitial prominence which could be chronic or relate to component developing interstitial edema. Correlate clinically.   Acute fractures of the right 7th through 9th ribs as described above. There is a subtle linear density subjacent to the site of fractures. A tiny right-sided pneumothorax can not be excluded. Attention on continued short-term follow-up or further evaluation with CT is suggested.   MACRO: Polly Weinberg discussed the significance and urgency of this critical finding by telephone with  SHANDRA MARTINEZ on 5/4/2024 at 2:43 am. (**-RCF-**) Findings:  See findings.   Signed by: Polly Weinberg 5/4/2024 2:44 AM Dictation workstation:   [id number]    Assessment/Plan      Principal Problem:    Pneumothorax    MARIA [place] ROSARIO-resolved  Creatinine 1.1 this morning, baseline is between 0.9-1  Morales catheter has been inserted  Consult nephrology, recommendations appreciated  IV fluids  Renally dose meds, avoid nephrotoxic medications  Monitor BMP closely     Acute hypoxic respiratory failure  Pneumothorax  Currently on 2L oxygen via NC, wean as tolerated  Secondary to above  Monitor aspiratory status  S/p chest tube placement  CT to water seal without air leak  General surgery managing  Incentive spirometer      Fall   Multiple rib fractures   Imaging as above  Pain management, incentive spirometer    Atrial fibrillation  S/p recent pacemaker placement 4/19/24  Have restarted Coumadin   Monitor on tele  Daily PT/INR  Continue metoprolol     Fall  Fall precautions  PT/OT/OOB  Patient and daughter would like  patient to go home with home health care on discharge     Diabetes Mellitus  Monitor blood glucose  Sliding scale insulin  Hypoglycemia protocol  [id number]     Chronic Diastolic Heart Failure  Was given IV Lasix by ICU NP  Daily weights  Monitor I/O  Resume home metoprolol with parameters    DVT/GI  SCDs, Coumadin, H2 blocker       5/6/2024: With sudden increase in creatinine and phosphorus today.  Low urine output overnight, bladder scan demonstrated 120 mLs but per nursing was unable to visualize bladder completely.  Will insert Morales catheter and consult nephrology.  Also with distended, generalized lower abdominal/suprapubic pain.  CT abdomen pelvis has been ordered.  CBC demonstrates leukocytosis this morning, UA/culture, blood cultures have been ordered.  Consulted infectious disease.  Will continue to follow.  5/7/24: Restarted coumadin yesterday, monitoring INR. MARIA DEL ROSARIO and leukocytosis improving from yesterday. Family declining SNF. Will continue to follow.  5/8/2024: MARIA DEL ROSARIO resolved.  Resolved leukocytosis.  Patient still complaining of weakness, declining SNF placement.  Still with chest tube.  Overall continues to improve clinically.  Will continue to follow.      Ruth Ann Marsh, APRN-CNP

## 2024-05-08 NOTE — PROGRESS NOTES
Patient not medically clear. Patient with chest tube. TCC met with patient's family. They are thinking that patient will now need to go to SNF. TCC provided them a list, waiting on choices. Will follow.      05/08/24 1201   Discharge Planning   Home or Post Acute Services Other (Comment)  (TBD)   Patient expects to be discharged to: TBD   Does the patient need discharge transport arranged? No

## 2024-05-08 NOTE — NURSING NOTE
Assumed care of patient. Bedside Report done. Resting when received with Right Chest tube to Water Seal  on. Denies pain or short of breath. IVFluid @ 75 ml/hr on. Assessment as charted.

## 2024-05-09 ENCOUNTER — APPOINTMENT (OUTPATIENT)
Dept: RADIOLOGY | Facility: HOSPITAL | Age: 89
DRG: 200 | End: 2024-05-09
Payer: MEDICARE

## 2024-05-09 LAB
ALBUMIN SERPL-MCNC: 3 G/DL (ref 3.5–5)
ANION GAP SERPL CALC-SCNC: 12 MMOL/L
BASOPHILS # BLD AUTO: 0.07 X10*3/UL (ref 0–0.1)
BASOPHILS NFR BLD AUTO: 0.6 %
BUN SERPL-MCNC: 46 MG/DL (ref 8–25)
CALCIUM SERPL-MCNC: 9.4 MG/DL (ref 8.5–10.4)
CHLORIDE SERPL-SCNC: 104 MMOL/L (ref 97–107)
CO2 SERPL-SCNC: 24 MMOL/L (ref 24–31)
CREAT SERPL-MCNC: 0.8 MG/DL (ref 0.4–1.6)
EGFRCR SERPLBLD CKD-EPI 2021: 68 ML/MIN/1.73M*2
EOSINOPHIL # BLD AUTO: 0.05 X10*3/UL (ref 0–0.4)
EOSINOPHIL NFR BLD AUTO: 0.4 %
ERYTHROCYTE [DISTWIDTH] IN BLOOD BY AUTOMATED COUNT: 14.4 % (ref 11.5–14.5)
GLUCOSE BLD MANUAL STRIP-MCNC: 102 MG/DL (ref 74–99)
GLUCOSE BLD MANUAL STRIP-MCNC: 108 MG/DL (ref 74–99)
GLUCOSE BLD MANUAL STRIP-MCNC: 112 MG/DL (ref 74–99)
GLUCOSE BLD MANUAL STRIP-MCNC: 91 MG/DL (ref 74–99)
GLUCOSE SERPL-MCNC: 100 MG/DL (ref 65–99)
HCT VFR BLD AUTO: 32.7 % (ref 36–46)
HGB BLD-MCNC: 11.2 G/DL (ref 12–16)
IMM GRANULOCYTES # BLD AUTO: 0.08 X10*3/UL (ref 0–0.5)
IMM GRANULOCYTES NFR BLD AUTO: 0.6 % (ref 0–0.9)
INR PPP: 2.3 (ref 0.9–1.2)
LYMPHOCYTES # BLD AUTO: 1.05 X10*3/UL (ref 0.8–3)
LYMPHOCYTES NFR BLD AUTO: 8.4 %
MCH RBC QN AUTO: 31.4 PG (ref 26–34)
MCHC RBC AUTO-ENTMCNC: 34.3 G/DL (ref 32–36)
MCV RBC AUTO: 92 FL (ref 80–100)
MONOCYTES # BLD AUTO: 1.7 X10*3/UL (ref 0.05–0.8)
MONOCYTES NFR BLD AUTO: 13.5 %
NEUTROPHILS # BLD AUTO: 9.6 X10*3/UL (ref 1.6–5.5)
NEUTROPHILS NFR BLD AUTO: 76.5 %
NRBC BLD-RTO: 0 /100 WBCS (ref 0–0)
PHOSPHATE SERPL-MCNC: 2.5 MG/DL (ref 2.5–4.5)
PLATELET # BLD AUTO: 169 X10*3/UL (ref 150–450)
POTASSIUM SERPL-SCNC: 3.8 MMOL/L (ref 3.4–5.1)
PROTHROMBIN TIME: 23.3 SECONDS (ref 9.3–12.7)
RBC # BLD AUTO: 3.57 X10*6/UL (ref 4–5.2)
SODIUM SERPL-SCNC: 140 MMOL/L (ref 133–145)
WBC # BLD AUTO: 12.6 X10*3/UL (ref 4.4–11.3)

## 2024-05-09 PROCEDURE — 85610 PROTHROMBIN TIME: CPT

## 2024-05-09 PROCEDURE — 2500000001 HC RX 250 WO HCPCS SELF ADMINISTERED DRUGS (ALT 637 FOR MEDICARE OP): Performed by: SURGERY

## 2024-05-09 PROCEDURE — 97535 SELF CARE MNGMENT TRAINING: CPT | Mod: GO,CO

## 2024-05-09 PROCEDURE — 2500000001 HC RX 250 WO HCPCS SELF ADMINISTERED DRUGS (ALT 637 FOR MEDICARE OP): Performed by: STUDENT IN AN ORGANIZED HEALTH CARE EDUCATION/TRAINING PROGRAM

## 2024-05-09 PROCEDURE — 85025 COMPLETE CBC W/AUTO DIFF WBC: CPT

## 2024-05-09 PROCEDURE — 74018 RADEX ABDOMEN 1 VIEW: CPT | Performed by: RADIOLOGY

## 2024-05-09 PROCEDURE — 74018 RADEX ABDOMEN 1 VIEW: CPT

## 2024-05-09 PROCEDURE — 97110 THERAPEUTIC EXERCISES: CPT | Mod: GP,CQ

## 2024-05-09 PROCEDURE — 97530 THERAPEUTIC ACTIVITIES: CPT | Mod: GO,CO

## 2024-05-09 PROCEDURE — 71045 X-RAY EXAM CHEST 1 VIEW: CPT | Performed by: RADIOLOGY

## 2024-05-09 PROCEDURE — 2500000001 HC RX 250 WO HCPCS SELF ADMINISTERED DRUGS (ALT 637 FOR MEDICARE OP)

## 2024-05-09 PROCEDURE — 71046 X-RAY EXAM CHEST 2 VIEWS: CPT

## 2024-05-09 PROCEDURE — 80069 RENAL FUNCTION PANEL: CPT | Performed by: HOSPITALIST

## 2024-05-09 PROCEDURE — 82947 ASSAY GLUCOSE BLOOD QUANT: CPT

## 2024-05-09 PROCEDURE — 71046 X-RAY EXAM CHEST 2 VIEWS: CPT | Performed by: RADIOLOGY

## 2024-05-09 PROCEDURE — 97116 GAIT TRAINING THERAPY: CPT | Mod: GP,CQ

## 2024-05-09 PROCEDURE — 71045 X-RAY EXAM CHEST 1 VIEW: CPT

## 2024-05-09 PROCEDURE — 2060000001 HC INTERMEDIATE ICU ROOM DAILY

## 2024-05-09 PROCEDURE — 36415 COLL VENOUS BLD VENIPUNCTURE: CPT

## 2024-05-09 RX ORDER — WARFARIN 2 MG/1
4 TABLET ORAL DAILY
Status: DISCONTINUED | OUTPATIENT
Start: 2024-05-09 | End: 2024-05-10

## 2024-05-09 RX ADMIN — WARFARIN SODIUM 4 MG: 2 TABLET ORAL at 18:10

## 2024-05-09 RX ADMIN — ALLOPURINOL 100 MG: 100 TABLET ORAL at 20:29

## 2024-05-09 RX ADMIN — LOSARTAN POTASSIUM 12.5 MG: 25 TABLET, FILM COATED ORAL at 09:15

## 2024-05-09 RX ADMIN — METOPROLOL TARTRATE 12.5 MG: 25 TABLET, FILM COATED ORAL at 09:15

## 2024-05-09 RX ADMIN — METOPROLOL TARTRATE 12.5 MG: 25 TABLET, FILM COATED ORAL at 20:28

## 2024-05-09 RX ADMIN — TRAMADOL HYDROCHLORIDE 50 MG: 50 TABLET ORAL at 20:29

## 2024-05-09 RX ADMIN — ACETAMINOPHEN 650 MG: 325 TABLET ORAL at 14:52

## 2024-05-09 ASSESSMENT — COGNITIVE AND FUNCTIONAL STATUS - GENERAL
DRESSING REGULAR LOWER BODY CLOTHING: A LOT
TURNING FROM BACK TO SIDE WHILE IN FLAT BAD: A LOT
HELP NEEDED FOR BATHING: A LOT
DAILY ACTIVITIY SCORE: 14
TURNING FROM BACK TO SIDE WHILE IN FLAT BAD: A LOT
PERSONAL GROOMING: A LITTLE
TOILETING: TOTAL
MOVING TO AND FROM BED TO CHAIR: A LOT
WALKING IN HOSPITAL ROOM: A LOT
MOVING FROM LYING ON BACK TO SITTING ON SIDE OF FLAT BED WITH BEDRAILS: A LOT
DAILY ACTIVITIY SCORE: 14
MOVING TO AND FROM BED TO CHAIR: A LOT
CLIMB 3 TO 5 STEPS WITH RAILING: A LOT
DRESSING REGULAR UPPER BODY CLOTHING: A LITTLE
STANDING UP FROM CHAIR USING ARMS: A LOT
MOBILITY SCORE: 12
MOBILITY SCORE: 12
MOVING FROM LYING ON BACK TO SITTING ON SIDE OF FLAT BED WITH BEDRAILS: A LOT
EATING MEALS: A LITTLE
WALKING IN HOSPITAL ROOM: A LOT
CLIMB 3 TO 5 STEPS WITH RAILING: A LOT
STANDING UP FROM CHAIR USING ARMS: A LOT
EATING MEALS: A LITTLE
DRESSING REGULAR LOWER BODY CLOTHING: A LOT
PERSONAL GROOMING: A LITTLE
DRESSING REGULAR UPPER BODY CLOTHING: A LITTLE
TOILETING: TOTAL
HELP NEEDED FOR BATHING: A LOT

## 2024-05-09 ASSESSMENT — PAIN - FUNCTIONAL ASSESSMENT
PAIN_FUNCTIONAL_ASSESSMENT: FLACC (FACE, LEGS, ACTIVITY, CRY, CONSOLABILITY)
PAIN_FUNCTIONAL_ASSESSMENT: 0-10
PAIN_FUNCTIONAL_ASSESSMENT: FLACC (FACE, LEGS, ACTIVITY, CRY, CONSOLABILITY)
PAIN_FUNCTIONAL_ASSESSMENT: 0-10
PAIN_FUNCTIONAL_ASSESSMENT: FLACC (FACE, LEGS, ACTIVITY, CRY, CONSOLABILITY)

## 2024-05-09 ASSESSMENT — PAIN SCALES - GENERAL
PAINLEVEL_OUTOF10: 0 - NO PAIN
PAINLEVEL_OUTOF10: 7
PAINLEVEL_OUTOF10: 5 - MODERATE PAIN
PAINLEVEL_OUTOF10: 0 - NO PAIN
PAINLEVEL_OUTOF10: 0 - NO PAIN
PAINLEVEL_OUTOF10: 4
PAINLEVEL_OUTOF10: 0 - NO PAIN
PAINLEVEL_OUTOF10: 4

## 2024-05-09 ASSESSMENT — PAIN SCALES - PAIN ASSESSMENT IN ADVANCED DEMENTIA (PAINAD)
FACIALEXPRESSION: SMILING OR INEXPRESSIVE
TOTALSCORE: 0
BREATHING: NORMAL
CONSOLABILITY: NO NEED TO CONSOLE
BODYLANGUAGE: RELAXED

## 2024-05-09 ASSESSMENT — ACTIVITIES OF DAILY LIVING (ADL): HOME_MANAGEMENT_TIME_ENTRY: 14

## 2024-05-09 ASSESSMENT — PAIN DESCRIPTION - DESCRIPTORS: DESCRIPTORS: ACHING;SHARP

## 2024-05-09 ASSESSMENT — PAIN DESCRIPTION - LOCATION: LOCATION: LEG

## 2024-05-09 ASSESSMENT — PAIN DESCRIPTION - ORIENTATION: ORIENTATION: LEFT

## 2024-05-09 NOTE — PROGRESS NOTES
Occupational Therapy    OT Treatment    Patient Name: Audelia Virgen  MRN: [id number]  Today's Date: 5/9/2024  Time Calculation  Start Time: 0810  Stop Time: 0833  Time Calculation (min): 23 min         Assessment:  OT Assessment: Tolerated session fairly, demonstrating improved functional tolerance, progressing towards POC. Pt would benefit from continued skilled OT services to improve strength, balance, and functional tolerance to increase independence with ADL tasks  End of Session Communication: Bedside nurse  End of Session Patient Position: Up in chair, Alarm on  OT Assessment Results: Decreased ADL status, Decreased upper extremity range of motion, Decreased upper extremity strength, Decreased endurance, Decreased functional mobility  Plan:  Treatment Interventions: ADL retraining, Functional transfer training, UE strengthening/ROM, Endurance training, Patient/family training, Neuromuscular reeducation, Compensatory technique education  OT Frequency: 5 times per week  OT Discharge Recommendations: Moderate intensity level of continued care  Equipment Recommended upon Discharge: Wheeled walker  OT Recommended Transfer Status: Assist of 2, Maximum assist  OT - OK to Discharge: Yes  Treatment Interventions: ADL retraining, Functional transfer training, UE strengthening/ROM, Endurance training, Patient/family training, Neuromuscular reeducation, Compensatory technique education    Subjective   General:  General  Family/Caregiver Present: Yes  Caregiver Feedback: daughter present at end of tx  Prior to Session Communication: Bedside nurse  Patient Position Received: Bed, 3 rail up, Alarm off, not on at start of session  General Comment: Cleared for therapy per RN. Pt supine in bed upon arrival and agreeable to tx  Precautions:  Hearing/Visual Limitations: glasses; B hearing aids  Medical Precautions: Fall precautions, Oxygen therapy device and L/min (2L O2 nc)    Pain:  Pain Assessment  Pain Assessment: FLACC  (Face, Legs, Activity, Cry, Consolability)  Pain Score: 4  Pain Type: Acute pain  Pain Location: Leg  Pain Orientation: Right    Objective    Cognition:  Cognition  Overall Cognitive Status: Within Functional Limits  Insight: Mild    Activities of Daily Living:    Grooming  Grooming Level of Assistance: Setup, Close supervision  Grooming Where Assessed: Chair  Grooming Comments: face washing and oral hygiene tasks    UE Dressing  UE Dressing Level of Assistance: Minimum assistance  UE Dressing Where Assessed: Chair  UE Dressing Comments: assist to don/doff gown    Toileting  Toileting Level of Assistance: Dependent  Toileting Comments: demonstrating bowel incontinence upon stand, requiring dependent rear pericare in standing at FWW  Functional Standing Tolerance:  Time: 3min  Functional Standing Tolerance Comments: tolerated standing at FWW during rear pericare hygiene  Bed Mobility/Transfers: Bed Mobility  Bed Mobility: Yes  Bed Mobility 1  Bed Mobility 1: Supine to sitting  Level of Assistance 1: Moderate assistance  Bed Mobility Comments 1: assist for BLE advancement off EOB and for trunk elevation with cues to scoot hips to EOB    Transfers  Transfer: Yes  Transfer 1  Technique 1: Sit to stand  Transfer Device 1: Walker  Transfer Level of Assistance 1: Moderate assistance  Trials/Comments 1: assist for trunk elevation off EOB with cues for forward weightshifting and proper hand placement  Transfers 2  Technique 2: Stand to sit  Transfer Device 2: Walker  Transfer Level of Assistance 2: Moderate assistance  Trials/Comments 2: assist for eccentric lowering with cues for proper hand placement    Functional Mobility:  Functional Mobility  Functional Mobility Performed: Yes  Functional Mobility 1  Device 1: Rolling walker  Assistance 1: Moderate assistance, Minimal verbal cues  Comments 1: tolerated taking ~6 steps to chair at FWW with cues for step/walker sequencing, demonstrating luana- balance throughout  task    Standing Balance:  Static Standing Balance  Static Standing-Level of Assistance: Moderate assistance  Static Standing-Comment/Number of Minutes: fair- balance at FWW during jessie hygiene  Outcome Measures:[facility] Daily Activity  Putting on and taking off regular lower body clothing: A lot  Bathing (including washing, rinsing, drying): A lot  Putting on and taking off regular upper body clothing: A little  Toileting, which includes using toilet, bedpan or urinal: Total  Taking care of personal grooming such as brushing teeth: A little  Eating Meals: A little  Daily Activity - Total Score: 14    Education Documentation  ADL Training, taught by CHERRI Stevens at 5/9/2024  9:53 AM.  Learner: Patient  Readiness: Acceptance  Method: Explanation  Response: Verbalizes Understanding    Education Comments  No comments found.      Problem: Functional Balance  Goal: STG - Maintains static sitting balance with upper extremity support and close supervision during functional activity  Description: I  Outcome: Progressing     Problem: Bathing  Goal: STG - Patient will bathe upper body with close vsupervision  Outcome: Progressing     Problem: Dressing Upper Extremities  Goal: LTG - Patient will complete upper body dressing with close supervision.  Outcome: Progressing     Problem: Eating  Goal: LTG - Patient will feed self independently  Outcome: Progressing     Problem: Grooming  Goal: STG - Patient will perform  grooming independent with set up  Outcome: Progressing     Problem: Toileting  Goal: STG - Patient will complete toileting tasks with close supervision and 2ww  Outcome: Progressing     Problem: OT Transfers  Goal: LTG - Patient will transfer from one surface to another with close supervision and 2ww  Outcome: Progressing

## 2024-05-09 NOTE — NURSING NOTE
Calls and needs anticipated. Ice pack applied to Right Leg per patient Moaning at times, Uncomfortable as claimed, Repositioned and Reassured. Will continue to monitor. Assessment same as previous. Call light in reach.

## 2024-05-09 NOTE — CARE PLAN
Problem: Pain - Adult  Goal: Verbalizes/displays adequate comfort level or baseline comfort level  Outcome: Progressing     Problem: Safety - Adult  Goal: Free from fall injury  Outcome: Progressing     Problem: Discharge Planning  Goal: Discharge to home or other facility with appropriate resources  Outcome: Progressing     Problem: Chronic Conditions and Co-morbidities  Goal: Patient's chronic conditions and co-morbidity symptoms are monitored and maintained or improved  Outcome: Progressing     Problem: Fall/Injury  Goal: Not fall by end of shift  Outcome: Progressing  Goal: Be free from injury by end of the shift  Outcome: Progressing  Goal: Verbalize understanding of personal risk factors for fall in the hospital  Outcome: Progressing  Goal: Verbalize understanding of risk factor reduction measures to prevent injury from fall in the home  Outcome: Progressing  Goal: Pace activities to prevent fatigue by end of the shift  Outcome: Progressing     Problem: Skin  Goal: Decreased wound size/increased tissue granulation at next dressing change  Outcome: Progressing  Goal: Participates in plan/prevention/treatment measures  Outcome: Progressing  Goal: Prevent/manage excess moisture  Outcome: Progressing  Goal: Prevent/minimize sheer/friction injuries  Outcome: Progressing  Goal: Promote/optimize nutrition  Outcome: Progressing  Goal: Promote skin healing  Outcome: Progressing     Problem: Pain  Goal: Takes deep breaths with improved pain control throughout the shift  Outcome: Progressing  Goal: Turns in bed with improved pain control throughout the shift  Outcome: Progressing  Goal: Walks with improved pain control throughout the shift  Outcome: Progressing  Goal: Performs ADL's with improved pain control throughout shift  Outcome: Progressing  Goal: Participates in PT with improved pain control throughout the shift  Outcome: Progressing  Goal: Free from opioid side effects throughout the shift  Outcome:  Progressing  Goal: Free from acute confusion related to pain meds throughout the shift  Outcome: Progressing     Problem: Pain  Goal: STG - Patients pain is managed to allow active participation in daily activities  Outcome: Progressing     Problem: Bathing  Goal: STG - Patient will bathe upper body with close vsupervision  Outcome: Progressing     Problem: Dressing Upper Extremities  Goal: LTG - Patient will complete upper body dressing with close supervision.  Outcome: Progressing     Problem: Eating  Goal: LTG - Patient will feed self independently  Outcome: Progressing     Problem: Grooming  Goal: STG - Patient will perform  grooming independent with set up  Outcome: Progressing     Problem: Toileting  Goal: STG - Patient will complete toileting tasks with close supervision and 2ww  Outcome: Progressing   The patient's goals for the shift include      The clinical goals for the shift include monitor VS, comfort    Over the shift, the patient did not make progress toward the following goals. Barriers to progression include . Recommendations to address these barriers include .

## 2024-05-09 NOTE — PROGRESS NOTES
Physical Therapy    Physical Therapy Treatment    Patient Name: Audelia Virgen  MRN: [id number]  Today's Date: 5/9/2024  IP Time Calculation  Start Time: 0811  Stop Time: 0834  Time Calculation (min): 23 min    Assessment/Plan   PT Assessment  End of Session Communication: Bedside nurse  End of Session Patient Position: Up in chair, Alarm on  PT Plan  Inpatient/Swing Bed or Outpatient: Inpatient  PT Plan  Treatment/Interventions: Bed mobility, Transfer training, Gait training, Balance training, Stair training, Neuromuscular re-education, Strengthening, Endurance training, Therapeutic exercise, Therapeutic activity  PT Plan: Skilled PT  PT Frequency: 5 times per week  PT Discharge Recommendations: Moderate intensity level of continued care  Equipment Recommended upon Discharge: Wheeled walker  PT Recommended Transfer Status: Assist x2  PT - OK to Discharge: Yes      General Visit Information:   PT  Visit  PT Received On: 05/09/24  General  Family/Caregiver Present: Yes  Caregiver Feedback: Daughter present at end of tx.  Prior to Session Communication: Bedside nurse  Patient Position Received: Bed, 3 rail up, Alarm off, not on at start of session  General Comment: Cleared by nursing to be seen for therapy, pt agreeable with tx, supine in bed upon arrival.    Subjective        Objective   Pain:  Pain Assessment  Pain Assessment: 0-10  Pain Score: 4  Pain Type: Acute pain  Pain Location: Hip  Pain Orientation: Right     Postural Control:  Static Sitting Balance  Static Sitting-Balance Support: Feet supported, Bilateral upper extremity supported  Static Sitting-Level of Assistance: Close supervision  Static Sitting-Comment/Number of Minutes: Seated EOB x5 minutes  Static Standing Balance  Static Standing-Balance Support: Bilateral upper extremity supported  Static Standing-Level of Assistance: Moderate assistance     Treatments:  Therapeutic Exercise  Therapeutic Exercise Performed: Yes  Therapeutic Exercise Activity 1:  Bilateral ankle pumps x15  Therapeutic Exercise Activity 2: Bilateral hip flexion x15  Therapeutic Exercise Activity 3: Bilateral knee extension x15  Therapeutic Exercise Activity 4: Resisted hip abd/add 15    Bed Mobility  Bed Mobility: Yes  Bed Mobility 1  Bed Mobility 1: Supine to sitting  Level of Assistance 1: Moderate assistance  Bed Mobility Comments 1: Mod assist for trunk/RLE during supine to sit, mod assist to scoot EOB with use of draw sheet.    Ambulation/Gait Training  Ambulation/Gait Training Performed: Yes  Ambulation/Gait Training 1  Surface 1: Level tile  Device 1: Rolling walker  Assistance 1: Moderate assistance  Quality of Gait 1: Narrow base of support, Decreased step length, Shuffling gait, Forward flexed posture  Comments/Distance (ft) 1: 5-6 steps (bed to chair) with wheeled walker, requires assist to manuever walker, mod assist for balance.    Transfers  Transfer: Yes  Transfer 1  Transfer From 1: Sit to  Transfer to 1: Stand  Technique 1: Sit to stand, Stand to sit  Transfer Level of Assistance 1: Moderate assistance  Trials/Comments 1: Mod assist for trunk up during sit to stand, decreased eccentric control in sitting.    Outcome Measures:  [facility] Basic Mobility  Turning from your back to your side while in a flat bed without using bedrails: A lot  Moving from lying on your back to sitting on the side of a flat bed without using bedrails: A lot  Moving to and from bed to chair (including a wheelchair): A lot  Standing up from a chair using your arms (e.g. wheelchair or bedside chair): A lot  To walk in hospital room: A lot  Climbing 3-5 steps with railing: A lot  Basic Mobility - Total Score: 12       Encounter Problems       Encounter Problems (Active)       Mobility       STG - Patient will ambulate 150' with LRAD and modified independence. (Progressing)       Start:  05/05/24    Expected End:  05/10/24            STG - Patient will ascend and descend two stairs with use of B handrails  and modified independence. (Progressing)       Start:  05/05/24    Expected End:  05/10/24               PT Transfers       STG - Patient to transfer to and from sit to supine with independence. (Progressing)       Start:  05/05/24    Expected End:  05/10/24            STG - Patient will transfer sit to and from stand with LRAD and modified independence. (Progressing)       Start:  05/05/24    Expected End:  05/10/24               Pain          Pain - Adult

## 2024-05-09 NOTE — PROGRESS NOTES
"Audelia Virgen is a 89 y.o. female on day 5 of admission presenting with Pneumothorax.    Subjective   Patient reports she feels \"a lot better\". Currently laying in bed, was up in chair for breakfast. Denies shortness of breath    Objective     Physical Exam  Vitals and nursing note reviewed.   Constitutional:       General: She is sleeping.      Appearance: Normal appearance.   HENT:      Head: Normocephalic and atraumatic.      Nose: Nose normal.      Mouth/Throat:      Mouth: Mucous membranes are dry.   Cardiovascular:      Rate and Rhythm: Normal rate.   Pulmonary:      Effort: Pulmonary effort is normal. No respiratory distress.   Chest:      Comments:     Abdominal:      General: Bowel sounds are normal. There is distension.      Palpations: Abdomen is soft.      Tenderness: There is no abdominal tenderness.      Comments: Mild distension, soft   Musculoskeletal:         General: Normal range of motion.      Cervical back: Normal range of motion.   Skin:     General: Skin is warm and dry.   Neurological:      General: No focal deficit present.      Motor: Weakness present.         Last Recorded Vitals  Blood pressure (!) 160/97, pulse 91, temperature 36.8 °C (98.2 °F), temperature source Oral, resp. rate 17, height 1.626 m (5' 4.02\"), weight 64.4 kg (141 lb 15.6 oz), SpO2 94%.  Intake/Output last 3 Shifts:  I/O last 3 completed shifts:  In: 940 (14.6 mL/kg) [P.O.:940]  Out: 3200 (49.7 mL/kg) [Urine:3200 (1.4 mL/kg/hr)]  Weight: 64.4 kg     Relevant Results  Lab Results   Component Value Date    WBC 12.6 (H) 05/09/2024    HGB 11.2 (L) 05/09/2024    HCT 32.7 (L) 05/09/2024    MCV 92 05/09/2024     05/09/2024     Lab Results   Component Value Date    GLUCOSE 100 (H) 05/09/2024    CALCIUM 9.4 05/09/2024     05/09/2024    K 3.8 05/09/2024    CO2 24 05/09/2024     05/09/2024    BUN 46 (H) 05/09/2024    CREATININE 0.80 05/09/2024       Assessment/Plan   Principal Problem:    Pneumothorax  5/9: " Post chest tube removal xray completed this morning, results pending. Patient and family agreeable for SNF. Care coordination waiting on facility choices from family. WBC slightly elevated -patient afebrile - recheck labs tomorrow.     5/8: Chest tube placed to waterseal yesterday. CXR 2 views to be completed this morning. If no hemothorax/pneumothorax visualized on imaging, tube to be pulled today. Repeat imaging tomorrow morning.     Addendum 1425: Chest tube pulled. Patient tolerated procedure without pain or difficulty. Repeat imaging ordered for tomorrow morning.     5/7: Chest tube placed to waterseal this morning will obtain 2 view chest x-ray this morning to evaluate hemothorax, pneumothorax.  Patient maintaining O2 saturation at 97% on 2 L.  Creatinine improved today and urine output improved.  Planning on stopping IV fluids tomorrow per nephrology.  Coumadin restarted, bridging with Lovenox.   I had a long discussion with patient's daughter today regarding plans for discharge.  The daughter states that patient will refuse nursing facility and in the past has signed herself out after 2 days.  She wants the patient to be discharged home, I explained my concerns with patient discharging home after recent pacemaker being placed and a recent fall.  Daughter reports that the patient will not be alone because either her or her  will be present.  Daughter is concerned because she feels the patient is steadily declining while inpatient.  We have stopped narcotics.  Encourage out of bed when possible and continue working with therapy.  Patient is moving her bowels without issues.   If patient and daughter do refuse skilled nursing facility, would like to get home health care set up. Will follow-up on chest x-ray results and see if we can get the chest tube removed today.    5/6: Chest xray ordered for this morning. If no pneumo, will plan to remove CT today. Continue with PT/OT. Nephrology consulted for MARIA DEL ROSARIO.  OK to restart coumadin    89 yo woman s/p fall with right sided rib fractures and PTX s/p pigtail chest tube placement  - Chest tube to water seal  - Repeat xray in morning, if no PTX, then chest tube can be removed  - PT/OT  - Patient and daughter would prefer that she discharge to home         I spent 45 minutes in the professional and overall care of this patient.      Giovanni Nelson, APRN-CNP

## 2024-05-09 NOTE — PROGRESS NOTES
05/09/24 1414   Discharge Planning   Patient expects to be discharged to: referrals sent for SNF   Does the patient need discharge transport arranged? Yes   RoundTrip coordination needed? Yes     Choices received from patients daughter   [facility]  [place]   Referrals sent at this time

## 2024-05-09 NOTE — CARE PLAN
Problem: Pain - Adult  Goal: Verbalizes/displays adequate comfort level or baseline comfort level  5/9/2024 0134 by Katie Montiel RN  Outcome: Progressing  5/9/2024 0132 by Kaite Montiel RN  Outcome: Progressing     Problem: Safety - Adult  Goal: Free from fall injury  5/9/2024 0134 by Katie Montiel RN  Outcome: Progressing  5/9/2024 0132 by Katie Montiel RN  Outcome: Progressing     Problem: Discharge Planning  Goal: Discharge to home or other facility with appropriate resources  5/9/2024 0134 by Katie Montiel RN  Outcome: Progressing  5/9/2024 0132 by Katie Montiel RN  Outcome: Progressing     Problem: Chronic Conditions and Co-morbidities  Goal: Patient's chronic conditions and co-morbidity symptoms are monitored and maintained or improved  5/9/2024 0134 by Katie Montiel RN  Outcome: Progressing  5/9/2024 0132 by Katie Montiel RN  Outcome: Progressing     Problem: Fall/Injury  Goal: Not fall by end of shift  5/9/2024 0134 by Katie Montiel, RN  Outcome: Progressing  5/9/2024 0132 by Katie Montiel RN  Outcome: Progressing  Goal: Be free from injury by end of the shift  5/9/2024 0134 by Katie Montiel, RN  Outcome: Progressing  5/9/2024 0132 by Katie Montiel RN  Outcome: Progressing  Goal: Verbalize understanding of personal risk factors for fall in the hospital  5/9/2024 0134 by Katie Montiel RN  Outcome: Progressing  5/9/2024 0132 by Katie Montiel RN  Outcome: Progressing  Goal: Verbalize understanding of risk factor reduction measures to prevent injury from fall in the home  5/9/2024 0134 by Katie Montiel, RN  Outcome: Progressing  5/9/2024 0132 by Katie Montiel RN  Outcome: Progressing  Goal: Pace activities to prevent fatigue by end of the shift  5/9/2024 0134 by Katie Montiel RN  Outcome: Progressing  5/9/2024 0132 by Katie Montiel,  RN  Outcome: Progressing     Problem: Skin  Goal: Decreased wound size/increased tissue granulation at next dressing change  5/9/2024 0134 by Katie Montiel RN  Outcome: Progressing  Flowsheets (Taken 5/8/2024 1945)  Decreased wound size/increased tissue granulation at next dressing change:   Promote sleep for wound healing   Protective dressings over bony prominences  5/9/2024 0132 by Katie Montiel RN  Outcome: Progressing  Flowsheets (Taken 5/8/2024 1945)  Decreased wound size/increased tissue granulation at next dressing change:   Promote sleep for wound healing   Protective dressings over bony prominences  Goal: Participates in plan/prevention/treatment measures  5/9/2024 0134 by Katie Montiel RN  Outcome: Progressing  Flowsheets (Taken 5/8/2024 1930)  Participates in plan/prevention/treatment measures: Discuss with provider PT/OT consult  5/9/2024 0132 by Katie Montiel RN  Outcome: Progressing  Flowsheets (Taken 5/8/2024 1930)  Participates in plan/prevention/treatment measures: Discuss with provider PT/OT consult  Goal: Prevent/manage excess moisture  5/9/2024 0134 by Katie Montiel RN  Outcome: Progressing  Flowsheets (Taken 5/8/2024 1930)  Prevent/manage excess moisture:   Cleanse incontinence/protect with barrier cream   Monitor for/manage infection if present   Moisturize dry skin  5/9/2024 0132 by Katie Montiel RN  Outcome: Progressing  Flowsheets (Taken 5/8/2024 1930)  Prevent/manage excess moisture:   Cleanse incontinence/protect with barrier cream   Monitor for/manage infection if present   Moisturize dry skin  Goal: Prevent/minimize sheer/friction injuries  5/9/2024 0134 by Katie Montiel RN  Outcome: Progressing  Flowsheets (Taken 5/8/2024 1930)  Prevent/minimize sheer/friction injuries:   Use pull sheet   HOB 30 degrees or less   Turn/reposition every 2 hours/use positioning/transfer devices  5/9/2024 0132 by Katie Montiel  RN  Outcome: Progressing  Flowsheets (Taken 5/8/2024 1930)  Prevent/minimize sheer/friction injuries:   Use pull sheet   HOB 30 degrees or less   Turn/reposition every 2 hours/use positioning/transfer devices  Goal: Promote/optimize nutrition  5/9/2024 0134 by Katie Montiel RN  Outcome: Progressing  Flowsheets (Taken 5/8/2024 1930)  Promote/optimize nutrition: Monitor/record intake including meals  5/9/2024 0132 by Katie Montiel RN  Outcome: Progressing  Flowsheets (Taken 5/8/2024 1930)  Promote/optimize nutrition: Monitor/record intake including meals  Goal: Promote skin healing  5/9/2024 0134 by Katie Montiel RN  Outcome: Progressing  Flowsheets (Taken 5/8/2024 1930)  Promote skin healing:   Assess skin/pad under line(s)/device(s)   Protective dressings over bony prominences   Turn/reposition every 2 hours/use positioning/transfer devices  5/9/2024 0132 by Katie Montiel RN  Outcome: Progressing  Flowsheets (Taken 5/8/2024 1930)  Promote skin healing:   Assess skin/pad under line(s)/device(s)   Protective dressings over bony prominences   Turn/reposition every 2 hours/use positioning/transfer devices     Problem: Pain  Goal: Takes deep breaths with improved pain control throughout the shift  5/9/2024 0134 by Katie Montiel RN  Outcome: Progressing  5/9/2024 0132 by Katie Montiel RN  Outcome: Progressing  Goal: Turns in bed with improved pain control throughout the shift  5/9/2024 0134 by Katie Montiel RN  Outcome: Progressing  5/9/2024 0132 by Katie Montiel RN  Outcome: Progressing  Goal: Walks with improved pain control throughout the shift  5/9/2024 0134 by Katie Montiel RN  Outcome: Progressing  5/9/2024 0132 by Katie Montiel RN  Outcome: Progressing  Goal: Performs ADL's with improved pain control throughout shift  5/9/2024 0134 by Katie Montiel RN  Outcome: Progressing  5/9/2024 0132 by Katie ORR  Tiana, KANU  Outcome: Progressing  Goal: Participates in PT with improved pain control throughout the shift  5/9/2024 0134 by Katie Montiel, RN  Outcome: Progressing  5/9/2024 0132 by Katie Montiel RN  Outcome: Progressing  Goal: Free from opioid side effects throughout the shift  5/9/2024 0134 by Katie Montiel, RN  Outcome: Progressing  5/9/2024 0132 by Katie Montiel RN  Outcome: Progressing  Goal: Free from acute confusion related to pain meds throughout the shift  5/9/2024 0134 by Katie Montiel, RN  Outcome: Progressing  5/9/2024 0132 by Katie Montiel RN  Outcome: Progressing     Problem: Pain  Goal: STG - Patients pain is managed to allow active participation in daily activities  5/9/2024 0134 by Katie Montiel, RN  Outcome: Progressing  5/9/2024 0132 by Katie Montiel RN  Outcome: Progressing     Problem: Bathing  Goal: STG - Patient will bathe upper body with close vsupervision  5/9/2024 0134 by aKtie Montiel, RN  Outcome: Progressing  5/9/2024 0132 by Katie Montiel RN  Outcome: Progressing     Problem: Dressing Upper Extremities  Goal: LTG - Patient will complete upper body dressing with close supervision.  5/9/2024 0134 by Katie Montiel, RN  Outcome: Progressing  5/9/2024 0132 by Katie Montiel RN  Outcome: Progressing     Problem: Eating  Goal: LTG - Patient will feed self independently  5/9/2024 0134 by Katie Montiel, RN  Outcome: Progressing  5/9/2024 0132 by Katie Montiel RN  Outcome: Progressing     Problem: Grooming  Goal: STG - Patient will perform  grooming independent with set up  5/9/2024 0134 by Katie Montiel, RN  Outcome: Progressing  5/9/2024 0132 by Katie Montiel RN  Outcome: Progressing     Problem: Toileting  Goal: STG - Patient will complete toileting tasks with close supervision and 2ww  5/9/2024 0134 by Katie Montiel RN  Outcome:  Progressing  5/9/2024 0132 by Katie Montiel RN  Outcome: Progressing   The patient's goals for the shift include      The clinical goals for the shift include monitor VS, comfort    Over the shift, the patient did not make progress toward the following goals. Barriers to progression include . Recommendations to address these barriers include .

## 2024-05-09 NOTE — PROGRESS NOTES
Audelia Virgen is a 89 y.o. female on day 5 of admission presenting with Pneumothorax.      Subjective   Patient seen and examined.  Daughter at bedside.  Chest tube was removed yesterday.  Patient states she is feeling better.  Denies chest pain or shortness of breath.  No acute distress.       Objective     Last Recorded Vitals  BP (!) 160/97 (BP Location: Right arm, Patient Position: Sitting)   Pulse 91   Temp 36.8 °C (98.2 °F) (Oral)   Resp 17   Wt 64.4 kg (141 lb 15.6 oz)   SpO2 94%   Intake/Output last 3 Shifts:    Intake/Output Summary (Last 24 hours) at 5/9/2024 1040  Last data filed at 5/9/2024 0441  Gross per 24 hour   Intake 600 ml   Output 1500 ml   Net -900 ml       Admission Weight  Weight: 58.7 kg (129 lb 6.6 oz) (05/04/24 0751)    Daily Weight  05/09/24 : 64.4 kg (141 lb 15.6 oz)    Image Results  XR chest 2 views  Narrative: Interpreted By:  Mariana Jones,   STUDY:  XR CHEST 2 VIEWS 5/8/2024 10:34 am      INDICATION:  Signs/Symptoms:Eval of pneumothorax, hemothorax      COMPARISON:  05/06/2024      ACCESSION NUMBER(S):  [id number]      ORDERING CLINICIAN:  JULIA BENAVIDES      TECHNIQUE:  AP erect and lateral views of the chest were acquired.      FINDINGS:  There is stable enlargement of the heart with pacemaker lead  identified in the right ventricle.      The right pigtail thoracostomy tube is unchanged in placement. On the  current study, no residual pneumothorax is seen. There is a small  hemothorax identified which is unchanged.      Displaced right rib fractures are once again noted. There is  postoperative change from left mastectomy and lymph node removal from  left axilla      Impression: No residual pneumothorax is seen but there is a stable small right  hemothorax noted with no change in the placement of the right  thoracostomy tube.      Signed by: Mariana Jones 5/8/2024 1:10 PM  Dictation workstation:   [id number]      Physical Exam  Vitals and nursing note reviewed.    Constitutional:       Appearance: Normal appearance.   HENT:      Head: Normocephalic and atraumatic.      Mouth/Throat:      Mouth: Mucous membranes are moist.   Eyes:      Extraocular Movements: Extraocular movements intact.      Pupils: Pupils are equal, round, and reactive to light.   Cardiovascular:      Rate and Rhythm: Normal rate. Rhythm irregular.      Pulses: Normal pulses.      Heart sounds: Normal heart sounds.   Pulmonary:      Effort: Pulmonary effort is normal.      Breath sounds: Normal breath sounds.      Comments: 2 L of oxygen via nasal cannula  Abdominal:      General: Bowel sounds are normal.   Musculoskeletal:         General: Normal range of motion.      Cervical back: Normal range of motion.      Comments: No edema, no erythema no tenderness, range of motion normal   Skin:     General: Skin is warm and dry.      Capillary Refill: Capillary refill takes less than 2 seconds.   Neurological:      General: No focal deficit present.      Mental Status: She is alert and oriented to person, place, and time.   Psychiatric:         Mood and Affect: Mood normal.         Relevant Results  Results for orders placed or performed during the hospital encounter of 05/04/24 (from the past 24 hour(s))   POCT GLUCOSE   Result Value Ref Range    POCT Glucose 85 74 - 99 mg/dL   POCT GLUCOSE   Result Value Ref Range    POCT Glucose 74 74 - 99 mg/dL   POCT GLUCOSE   Result Value Ref Range    POCT Glucose 104 (H) 74 - 99 mg/dL   CBC and Auto Differential   Result Value Ref Range    WBC 12.6 (H) 4.4 - 11.3 x10*3/uL    nRBC 0.0 0.0 - 0.0 /100 WBCs    RBC 3.57 (L) 4.00 - 5.20 x10*6/uL    Hemoglobin 11.2 (L) 12.0 - 16.0 g/dL    Hematocrit 32.7 (L) 36.0 - 46.0 %    MCV 92 80 - 100 fL    MCH 31.4 26.0 - 34.0 pg    MCHC 34.3 32.0 - 36.0 g/dL    RDW 14.4 11.5 - 14.5 %    Platelets 169 150 - 450 x10*3/uL    Neutrophils % 76.5 40.0 - 80.0 %    Immature Granulocytes %, Automated 0.6 0.0 - 0.9 %    Lymphocytes % 8.4 13.0 -  44.0 %    Monocytes % 13.5 2.0 - 10.0 %    Eosinophils % 0.4 0.0 - 6.0 %    Basophils % 0.6 0.0 - 2.0 %    Neutrophils Absolute 9.60 (H) 1.60 - 5.50 x10*3/uL    Immature Granulocytes Absolute, Automated 0.08 0.00 - 0.50 x10*3/uL    Lymphocytes Absolute 1.05 0.80 - 3.00 x10*3/uL    Monocytes Absolute 1.70 (H) 0.05 - 0.80 x10*3/uL    Eosinophils Absolute 0.05 0.00 - 0.40 x10*3/uL    Basophils Absolute 0.07 0.00 - 0.10 x10*3/uL   Renal Function Panel   Result Value Ref Range    Glucose 100 (H) 65 - 99 mg/dL    Sodium 140 133 - 145 mmol/L    Potassium 3.8 3.4 - 5.1 mmol/L    Chloride 104 97 - 107 mmol/L    Bicarbonate 24 24 - 31 mmol/L    Urea Nitrogen 46 (H) 8 - 25 mg/dL    Creatinine 0.80 0.40 - 1.60 mg/dL    eGFR 68 >60 mL/min/1.73m*2    Calcium 9.4 8.5 - 10.4 mg/dL    Phosphorus 2.5 2.5 - 4.5 mg/dL    Albumin 3.0 (L) 3.5 - 5.0 g/dL    Anion Gap 12 <=19 mmol/L   Protime-INR   Result Value Ref Range    Protime 23.3 (H) 9.3 - 12.7 seconds    INR 2.3 (H) 0.9 - 1.2   POCT GLUCOSE   Result Value Ref Range    POCT Glucose 91 74 - 99 mg/dL      Assessment/Plan      Principal Problem:    Pneumothorax    MARIA DEL ROSARIO-resolved  Creatinine 0.8 this morning, baseline is between 0.9-1  Consult nephrology, recommendations appreciated  Renally dose meds, avoid nephrotoxic medications  Monitor BMP closely     Acute hypoxic respiratory failure  Pneumothorax  Currently on 2L oxygen via NC, wean as tolerated  Secondary to above  Monitor respiratory status  Chest tube removed on 5/8/2024  General surgery managing  Encourage incentive spirometer      Fall   Multiple rib fractures   Fall precautions  Pain management, incentive spirometer    Atrial fibrillation  S/p recent pacemaker placement 4/19/24  Have restarted Coumadin   Monitor on tele  Daily PT/INR, today 2.3  Continue metoprolol     Fall  Fall precautions  PT/OT/OOB  Patient and daughter now agreeable to SNF placement      Diabetes Mellitus  POC blood sugar checks ACHS  Sliding scale  insulin  Hypoglycemia protocol    Chronic Diastolic Heart Failure  No evidence of acute exacerbation at this time  Daily weights  Monitor I/O  Resume home metoprolol with parameters    DVT/GI  SCDs, Coumadin, H2 blocker       5/6/2024: With sudden increase in creatinine and phosphorus today.  Low urine output overnight, bladder scan demonstrated 120 mLs but per nursing was unable to visualize bladder completely.  Will insert Morales catheter and consult nephrology.  Also with distended, generalized lower abdominal/suprapubic pain.  CT abdomen pelvis has been ordered.  CBC demonstrates leukocytosis this morning, UA/culture, blood cultures have been ordered.  Consulted infectious disease.  Will continue to follow.  5/7/24: Restarted coumadin yesterday, monitoring INR. MARIA DEL ROSARIO and leukocytosis improving from yesterday. Family declining SNF. Will continue to follow.  5/8/2024: MARIA DEL ROSARIO resolved.  Resolved leukocytosis.  Patient still complaining of weakness, declining SNF placement.  Still with chest tube.  Overall continues to improve clinically.  Will continue to follow.  5/9/2024: Chest tube removed yesterday.  Continues to improve clinically.  Will attempt to wean off oxygen today.  Have encouraged IC/OOB with nursing and patient/family. Patient and family now agreeable to SNF placement, case management managing.  Will continue to follow.      Ruth Ann Marsh, JAIMIE-CNP

## 2024-05-09 NOTE — CARE PLAN
The patient's goals for the shift include  feel better    The clinical goals for the shift include Pain Free

## 2024-05-09 NOTE — NURSING NOTE
Care continued. Rounded, Patient asleep at this time. Pain free. Possible discharge home with home health care per family request.

## 2024-05-10 ENCOUNTER — PHARMACY VISIT (OUTPATIENT)
Dept: PHARMACY | Facility: CLINIC | Age: 89
End: 2024-05-10

## 2024-05-10 VITALS
DIASTOLIC BLOOD PRESSURE: 69 MMHG | RESPIRATION RATE: 18 BRPM | TEMPERATURE: 97.7 F | OXYGEN SATURATION: 92 % | HEIGHT: 64 IN | BODY MASS INDEX: 23.98 KG/M2 | HEART RATE: 95 BPM | WEIGHT: 140.43 LBS | SYSTOLIC BLOOD PRESSURE: 162 MMHG

## 2024-05-10 PROBLEM — J93.9 PNEUMOTHORAX: Status: RESOLVED | Noted: 2024-05-04 | Resolved: 2024-05-10

## 2024-05-10 LAB
ALBUMIN SERPL-MCNC: 2.9 G/DL (ref 3.5–5)
ANION GAP SERPL CALC-SCNC: 11 MMOL/L
BACTERIA BLD CULT: NORMAL
BACTERIA BLD CULT: NORMAL
BASOPHILS # BLD AUTO: 0.06 X10*3/UL (ref 0–0.1)
BASOPHILS NFR BLD AUTO: 0.5 %
BUN SERPL-MCNC: 30 MG/DL (ref 8–25)
CALCIUM SERPL-MCNC: 9.1 MG/DL (ref 8.5–10.4)
CHLORIDE SERPL-SCNC: 102 MMOL/L (ref 97–107)
CO2 SERPL-SCNC: 25 MMOL/L (ref 24–31)
CREAT SERPL-MCNC: 0.7 MG/DL (ref 0.4–1.6)
EGFRCR SERPLBLD CKD-EPI 2021: 80 ML/MIN/1.73M*2
EOSINOPHIL # BLD AUTO: 0.07 X10*3/UL (ref 0–0.4)
EOSINOPHIL NFR BLD AUTO: 0.5 %
ERYTHROCYTE [DISTWIDTH] IN BLOOD BY AUTOMATED COUNT: 14.6 % (ref 11.5–14.5)
EST. AVERAGE GLUCOSE BLD GHB EST-MCNC: 117 MG/DL
GLUCOSE BLD MANUAL STRIP-MCNC: 111 MG/DL (ref 74–99)
GLUCOSE BLD MANUAL STRIP-MCNC: 122 MG/DL (ref 74–99)
GLUCOSE SERPL-MCNC: 103 MG/DL (ref 65–99)
HBA1C MFR BLD: 5.7 %
HCT VFR BLD AUTO: 33.7 % (ref 36–46)
HGB BLD-MCNC: 11.6 G/DL (ref 12–16)
IMM GRANULOCYTES # BLD AUTO: 0.2 X10*3/UL (ref 0–0.5)
IMM GRANULOCYTES NFR BLD AUTO: 1.5 % (ref 0–0.9)
INR PPP: 3.2 (ref 0.9–1.2)
LYMPHOCYTES # BLD AUTO: 1.14 X10*3/UL (ref 0.8–3)
LYMPHOCYTES NFR BLD AUTO: 8.7 %
MCH RBC QN AUTO: 31 PG (ref 26–34)
MCHC RBC AUTO-ENTMCNC: 34.4 G/DL (ref 32–36)
MCV RBC AUTO: 90 FL (ref 80–100)
MONOCYTES # BLD AUTO: 1.81 X10*3/UL (ref 0.05–0.8)
MONOCYTES NFR BLD AUTO: 13.8 %
NEUTROPHILS # BLD AUTO: 9.81 X10*3/UL (ref 1.6–5.5)
NEUTROPHILS NFR BLD AUTO: 75 %
NRBC BLD-RTO: 0 /100 WBCS (ref 0–0)
PHOSPHATE SERPL-MCNC: 2.2 MG/DL (ref 2.5–4.5)
PLATELET # BLD AUTO: 175 X10*3/UL (ref 150–450)
POTASSIUM SERPL-SCNC: 3.6 MMOL/L (ref 3.4–5.1)
PROTHROMBIN TIME: 31.8 SECONDS (ref 9.3–12.7)
RBC # BLD AUTO: 3.74 X10*6/UL (ref 4–5.2)
SODIUM SERPL-SCNC: 138 MMOL/L (ref 133–145)
WBC # BLD AUTO: 13.1 X10*3/UL (ref 4.4–11.3)

## 2024-05-10 PROCEDURE — 83036 HEMOGLOBIN GLYCOSYLATED A1C: CPT

## 2024-05-10 PROCEDURE — 2500000001 HC RX 250 WO HCPCS SELF ADMINISTERED DRUGS (ALT 637 FOR MEDICARE OP): Performed by: STUDENT IN AN ORGANIZED HEALTH CARE EDUCATION/TRAINING PROGRAM

## 2024-05-10 PROCEDURE — RXMED WILLOW AMBULATORY MEDICATION CHARGE

## 2024-05-10 PROCEDURE — 85025 COMPLETE CBC W/AUTO DIFF WBC: CPT

## 2024-05-10 PROCEDURE — 2500000004 HC RX 250 GENERAL PHARMACY W/ HCPCS (ALT 636 FOR OP/ED): Performed by: STUDENT IN AN ORGANIZED HEALTH CARE EDUCATION/TRAINING PROGRAM

## 2024-05-10 PROCEDURE — 84520 ASSAY OF UREA NITROGEN: CPT | Mod: 91 | Performed by: HOSPITALIST

## 2024-05-10 PROCEDURE — 2500000001 HC RX 250 WO HCPCS SELF ADMINISTERED DRUGS (ALT 637 FOR MEDICARE OP)

## 2024-05-10 PROCEDURE — 97530 THERAPEUTIC ACTIVITIES: CPT | Mod: GP,CQ

## 2024-05-10 PROCEDURE — 97110 THERAPEUTIC EXERCISES: CPT | Mod: GP,CQ

## 2024-05-10 PROCEDURE — 2500000005 HC RX 250 GENERAL PHARMACY W/O HCPCS: Performed by: STUDENT IN AN ORGANIZED HEALTH CARE EDUCATION/TRAINING PROGRAM

## 2024-05-10 PROCEDURE — 97116 GAIT TRAINING THERAPY: CPT | Mod: GP,CQ

## 2024-05-10 PROCEDURE — 36415 COLL VENOUS BLD VENIPUNCTURE: CPT

## 2024-05-10 PROCEDURE — 82947 ASSAY GLUCOSE BLOOD QUANT: CPT | Mod: 59

## 2024-05-10 PROCEDURE — 85610 PROTHROMBIN TIME: CPT

## 2024-05-10 RX ORDER — LIDOCAINE 560 MG/1
1 PATCH PERCUTANEOUS; TOPICAL; TRANSDERMAL DAILY
Qty: 10 PATCH | Refills: 0 | Status: SHIPPED | OUTPATIENT
Start: 2024-05-11

## 2024-05-10 RX ORDER — LOSARTAN POTASSIUM 25 MG/1
25 TABLET ORAL DAILY
Status: DISCONTINUED | OUTPATIENT
Start: 2024-05-10 | End: 2024-05-10 | Stop reason: HOSPADM

## 2024-05-10 RX ORDER — ACETAMINOPHEN 325 MG/1
650 TABLET ORAL EVERY 4 HOURS PRN
Qty: 30 TABLET | Refills: 0 | Status: SHIPPED | OUTPATIENT
Start: 2024-05-10

## 2024-05-10 RX ADMIN — LOSARTAN POTASSIUM 25 MG: 25 TABLET, FILM COATED ORAL at 09:00

## 2024-05-10 RX ADMIN — LIDOCAINE 1 PATCH: 4 PATCH TOPICAL at 08:51

## 2024-05-10 RX ADMIN — FAMOTIDINE 20 MG: 20 TABLET, FILM COATED ORAL at 08:51

## 2024-05-10 RX ADMIN — ACETAMINOPHEN 650 MG: 325 TABLET ORAL at 06:50

## 2024-05-10 RX ADMIN — METOPROLOL TARTRATE 12.5 MG: 25 TABLET, FILM COATED ORAL at 08:51

## 2024-05-10 ASSESSMENT — PAIN - FUNCTIONAL ASSESSMENT
PAIN_FUNCTIONAL_ASSESSMENT: 0-10

## 2024-05-10 ASSESSMENT — COGNITIVE AND FUNCTIONAL STATUS - GENERAL
CLIMB 3 TO 5 STEPS WITH RAILING: A LOT
MOVING TO AND FROM BED TO CHAIR: A LITTLE
STANDING UP FROM CHAIR USING ARMS: A LITTLE
WALKING IN HOSPITAL ROOM: A LITTLE
MOVING FROM LYING ON BACK TO SITTING ON SIDE OF FLAT BED WITH BEDRAILS: A LOT
TURNING FROM BACK TO SIDE WHILE IN FLAT BAD: A LOT
MOBILITY SCORE: 15

## 2024-05-10 ASSESSMENT — PAIN SCALES - GENERAL
PAINLEVEL_OUTOF10: 0 - NO PAIN
PAINLEVEL_OUTOF10: 0 - NO PAIN
PAINLEVEL_OUTOF10: 1
PAINLEVEL_OUTOF10: 3

## 2024-05-10 ASSESSMENT — PAIN DESCRIPTION - ORIENTATION: ORIENTATION: RIGHT

## 2024-05-10 ASSESSMENT — PAIN DESCRIPTION - LOCATION
LOCATION: BACK
LOCATION: ABDOMEN

## 2024-05-10 NOTE — PROGRESS NOTES
05/10/24 1318   Discharge Planning   Patient expects to be discharged to: [facility] will have team start precert     UPDATE 1330: approved 5/10-5/16 NRD 5/14 auth#[id number]

## 2024-05-10 NOTE — CARE PLAN
Problem: Pain - Adult  Goal: Verbalizes/displays adequate comfort level or baseline comfort level  Outcome: Progressing     Problem: Safety - Adult  Goal: Free from fall injury  Outcome: Progressing     Problem: Fall/Injury  Goal: Not fall by end of shift  Outcome: Progressing  Goal: Be free from injury by end of the shift  Outcome: Progressing  Goal: Verbalize understanding of personal risk factors for fall in the hospital  Outcome: Progressing  Goal: Verbalize understanding of risk factor reduction measures to prevent injury from fall in the home  Outcome: Progressing  Goal: Pace activities to prevent fatigue by end of the shift  Outcome: Progressing  Goal: Use assistive devices by end of the shift  Outcome: Progressing   The patient's goals for the shift include      The clinical goals for the shift include remain pain free    Over the shift, the patient did not make progress toward the following goals. Barriers to progression include chronic leg pain. Recommendations to address these barriers include medicate as prescribed.

## 2024-05-10 NOTE — DISCHARGE SUMMARY
Discharge Diagnosis  Pneumothorax    Issues Requiring Follow-Up  Patient should follow up with her primary care physician as normal.     Test Results Pending At Discharge  Pending Labs       Order Current Status    Osmolality, Urine Collected (05/06/24 1107)    Blood Culture Preliminary result    Blood Culture Preliminary result            Hospital Course  Patient reported to [place] ED after experiencing a fall at home. She developed hemopneumothorax and a right sided chest tube was placed. She had interval resolution of pneumothorax and chest tube was removed. She developed an acute kidney injury while inpatient and that has also resolved. She is being discharged to SNF.    Pertinent Physical Exam At Time of Discharge  Physical Exam  Vitals and nursing note reviewed.   Constitutional:       Appearance: Normal appearance.   HENT:      Head: Normocephalic and atraumatic.   Cardiovascular:      Rate and Rhythm: Normal rate.   Pulmonary:      Effort: Pulmonary effort is normal.   Chest:      Comments: Dressing right chest with shadowing  Abdominal:      General: Abdomen is flat. Bowel sounds are normal.      Palpations: Abdomen is soft.   Musculoskeletal:         General: No swelling or tenderness.      Right hand: Swelling present.      Comments: Right hand swelling and ecchymosis   Skin:     General: Skin is warm and dry.      Comments: Generalized bruising   Neurological:      Mental Status: She is alert and oriented to person, place, and time.         Home Medications     Medication List      ASK your doctor about these medications     allopurinol 100 mg tablet; Commonly known as: Zyloprim   calcium carbonate 500 mg calcium (1,250 mg) tablet; Commonly known as:   Oscal   losartan 25 mg tablet; Commonly known as: Cozaar   metFORMIN 500 mg tablet; Commonly known as: Glucophage   metoprolol tartrate 25 mg tablet; Commonly known as: Lopressor   warfarin 5 mg tablet; Commonly known as: Coumadin       Outpatient  Follow-Up  No future appointments.    Giovanni Nelson, APRN-CNP

## 2024-05-10 NOTE — DISCHARGE INSTRUCTIONS
HOLD COUMADIN TODAY 5/10/24  Repeat PT INR tomorrow, 5/11/24 and qdaily.   Repeat CBC with differential tomorrow 5/11/24.

## 2024-05-10 NOTE — PROGRESS NOTES
Physical Therapy    Physical Therapy Treatment    Patient Name: Audelia Virgen  MRN: [id number]  Today's Date: 5/10/2024  Time Calculation  Start Time: 0912  Stop Time: 0935  Time Calculation (min): 23 min    Assessment/Plan   PT Assessment  End of Session Communication: Bedside nurse  End of Session Patient Position: Up in chair, Alarm on  PT Plan  Inpatient/Swing Bed or Outpatient: Inpatient  PT Plan  Treatment/Interventions: Bed mobility, Transfer training, Gait training, Balance training, Stair training, Neuromuscular re-education, Strengthening, Endurance training, Therapeutic exercise, Therapeutic activity  PT Plan: Skilled PT  PT Frequency: 5 times per week  PT Discharge Recommendations: Moderate intensity level of continued care  Equipment Recommended upon Discharge: Wheeled walker  PT Recommended Transfer Status: Assist x2  PT - OK to Discharge: Yes      General Visit Information:   PT  Visit  PT Received On: 05/10/24  General  Prior to Session Communication: Bedside nurse  Patient Position Received: Up in chair, Alarm on  General Comment: Cleared by nursing to be seen for therapy, pt agreeable with tx, seated in chair upon arrival.    Subjective        Objective   Pain:  Pain Assessment  Pain Assessment: 0-10  Pain Score: 0 - No pain     Postural Control:  Static Sitting Balance  Static Sitting-Balance Support: Feet supported, Bilateral upper extremity supported  Static Sitting-Level of Assistance: Close supervision  Static Standing Balance  Static Standing-Balance Support: Bilateral upper extremity supported  Static Standing-Level of Assistance: Minimum assistance     Treatments:  Therapeutic Exercise  Therapeutic Exercise Performed: Yes  Therapeutic Exercise Activity 1: Bilateral ankle pumps x15  Therapeutic Exercise Activity 2: Bilateral hip flexion x15  Therapeutic Exercise Activity 3: Bilateral knee extension x15  Therapeutic Exercise Activity 4: Resisted hip abd/add 15    Therapeutic  Activity  Therapeutic Activity Performed: Yes  Therapeutic Activity 1: During sit to stand noted patient incontinent of large amount of urine, pt required dependent cleanse and sock change.    Bed Mobility  Bed Mobility: No    Ambulation/Gait Training  Ambulation/Gait Training Performed: Yes  Ambulation/Gait Training 1  Surface 1: Level tile  Device 1: Rolling walker  Assistance 1: Minimum assistance  Quality of Gait 1: Narrow base of support, Decreased step length, Shuffling gait, Forward flexed posture  Comments/Distance (ft) 1: 8' with wheeled walker, min assist for balance.    Transfers  Transfer: Yes  Transfer 1  Transfer From 1: Sit to  Transfer to 1: Stand  Technique 1: Sit to stand, Stand to sit  Transfer Level of Assistance 1: Minimum assistance  Trials/Comments 1: Min assist for trunk up during sit to stand, cues for proper hand placement, decreased eccentric control in sitting.    Outcome Measures:  [facility] Basic Mobility  Turning from your back to your side while in a flat bed without using bedrails: A lot  Moving from lying on your back to sitting on the side of a flat bed without using bedrails: A lot  Moving to and from bed to chair (including a wheelchair): A little  Standing up from a chair using your arms (e.g. wheelchair or bedside chair): A little  To walk in hospital room: A little  Climbing 3-5 steps with railing: A lot  Basic Mobility - Total Score: 15         Encounter Problems       Encounter Problems (Active)       Mobility       STG - Patient will ambulate 150' with LRAD and modified independence. (Progressing)       Start:  05/05/24    Expected End:  05/24/24            STG - Patient will ascend and descend two stairs with use of B handrails and modified independence. (Progressing)       Start:  05/05/24    Expected End:  05/24/24               PT Transfers       STG - Patient to transfer to and from sit to supine with independence. (Progressing)       Start:  05/05/24    Expected End:   05/24/24            STG - Patient will transfer sit to and from stand with LRAD and modified independence. (Progressing)       Start:  05/05/24    Expected End:  05/24/24               Pain          Pain - Adult

## 2024-05-10 NOTE — PROGRESS NOTES
"Audelia Virgen is a 89 y.o. female on day 6 of admission presenting with Pneumothorax.      Subjective   Patient seen and examined, sitting up in chair.  States she is feeling \"better\".  Daughter at bedside.  Denies acute events overnight.  Was complaining of mild generalized abdominal pain yesterday, unremarkable KUB and chest x-ray.  Currently denies any pain; denies shortness of breath.  No acute distress.       Objective     Last Recorded Vitals  BP (!) 158/94 (BP Location: Right arm, Patient Position: Lying)   Pulse 95   Temp 36.8 °C (98.2 °F) (Temporal)   Resp 19   Wt 63.7 kg (140 lb 6.9 oz)   SpO2 93%   Intake/Output last 3 Shifts:    Intake/Output Summary (Last 24 hours) at 5/10/2024 1108  Last data filed at 5/10/2024 0434  Gross per 24 hour   Intake 480 ml   Output 700 ml   Net -220 ml       Admission Weight  Weight: 58.7 kg (129 lb 6.6 oz) (05/04/24 0751)    Daily Weight  05/10/24 : 63.7 kg (140 lb 6.9 oz)    Image Results  XR abdomen 1 view  Narrative: Interpreted By:  Clement Villalba,   STUDY:  XR ABDOMEN 1 VIEW;  5/9/2024 4:53 pm      INDICATION:  Signs/Symptoms:abdominal pain.      COMPARISON:  None.      ACCESSION NUMBER(S):  [id number]      ORDERING CLINICIAN:  KURTIS BURNHAM      FINDINGS:  Please see the impression      Impression: 1.  No radiographic evidence of high-grade bowel obstruction.  2. Multilevel thoracolumbar spondylosis and levoscoliosis.  Osteoarthritis of the bilateral hip joints.      MACRO:  None      Signed by: Clement Villalba 5/9/2024 5:11 PM  Dictation workstation:   [id number]  XR chest 1 view  Narrative: Interpreted By:  Mariana Jones,   STUDY:  XR CHEST 1 VIEW 5/9/2024 4:53 pm      INDICATION:  Pain. Pneumothorax.      COMPARISON:  Study done earlier today at 11:21 a.m.      ACCESSION NUMBER(S):  [id number]      ORDERING CLINICIAN:  KURTIS BURNHAM      TECHNIQUE:  AP erect view of the chest      FINDINGS:  No delayed pneumothorax is seen following chest tube removal. There  is " stable small right hemothorax with displaced right rib fractures  unchanged.      The heart remains enlarged. Small left pleural effusion is present.      Unipolar pacemaker lead terminates in right ventricle. There is  postoperative change from left mastectomy.      Impression: Stable small right hemothorax with no delayed pneumothorax following  chest tube removal.      Small left pleural effusion.      Stable enlargement of the cardiac silhouette.      Signed by: Mariana Jones 5/9/2024 4:58 PM  Dictation workstation:   [id number]  XR chest 2 views  Narrative: Interpreted By:  Mariana Jones,   STUDY:  XR CHEST 2 VIEWS 5/9/2024 11:28 am      INDICATION:  Signs/Symptoms:chest tube removal      COMPARISON:  05/08/2024      ACCESSION NUMBER(S):  [id number]      ORDERING CLINICIAN:  LEAH FOSTER      TECHNIQUE:  AP erect and lateral views of the chest      FINDINGS:  There is interval removal of the pigtail thoracostomy tube on the  right. No pneumothorax is seen following its removal. There is stable  right hemothorax with displaced right rib fractures again noted.      There is enlargement of the cardiac silhouette unchanged with  pacemaker lead in right ventricle. Postoperative change from left  mastectomy and lymph node removal from the left axilla is seen. A  small left pleural effusion is present.      Impression: No pneumothorax following right chest tube removal with stable right  hemothorax.      Cardiomegaly unchanged with small left pleural effusion.      Signed by: Mariana Jones 5/9/2024 2:56 PM  Dictation workstation:   [id number]      Physical Exam  Vitals and nursing note reviewed.   Constitutional:       Appearance: Normal appearance.   HENT:      Head: Normocephalic and atraumatic.      Mouth/Throat:      Mouth: Mucous membranes are moist.   Eyes:      Extraocular Movements: Extraocular movements intact.      Pupils: Pupils are equal, round, and reactive to light.   Cardiovascular:      Rate and  Rhythm: Normal rate. Rhythm irregular.      Pulses: Normal pulses.      Heart sounds: Normal heart sounds.   Pulmonary:      Effort: Pulmonary effort is normal.      Breath sounds: Normal breath sounds.      Comments: 2 L of oxygen via nasal cannula  Abdominal:      General: Bowel sounds are normal.   Musculoskeletal:         General: Normal range of motion.      Cervical back: Normal range of motion.      Comments: No edema, no erythema no tenderness, range of motion normal   Skin:     General: Skin is warm and dry.      Capillary Refill: Capillary refill takes less than 2 seconds.   Neurological:      General: No focal deficit present.      Mental Status: She is alert and oriented to person, place, and time.   Psychiatric:         Mood and Affect: Mood normal.         Relevant Results  Results for orders placed or performed during the hospital encounter of 05/04/24 (from the past 24 hour(s))   POCT GLUCOSE   Result Value Ref Range    POCT Glucose 112 (H) 74 - 99 mg/dL   POCT GLUCOSE   Result Value Ref Range    POCT Glucose 108 (H) 74 - 99 mg/dL   POCT GLUCOSE   Result Value Ref Range    POCT Glucose 102 (H) 74 - 99 mg/dL   CBC and Auto Differential   Result Value Ref Range    WBC 13.1 (H) 4.4 - 11.3 x10*3/uL    nRBC 0.0 0.0 - 0.0 /100 WBCs    RBC 3.74 (L) 4.00 - 5.20 x10*6/uL    Hemoglobin 11.6 (L) 12.0 - 16.0 g/dL    Hematocrit 33.7 (L) 36.0 - 46.0 %    MCV 90 80 - 100 fL    MCH 31.0 26.0 - 34.0 pg    MCHC 34.4 32.0 - 36.0 g/dL    RDW 14.6 (H) 11.5 - 14.5 %    Platelets 175 150 - 450 x10*3/uL    Neutrophils % 75.0 40.0 - 80.0 %    Immature Granulocytes %, Automated 1.5 (H) 0.0 - 0.9 %    Lymphocytes % 8.7 13.0 - 44.0 %    Monocytes % 13.8 2.0 - 10.0 %    Eosinophils % 0.5 0.0 - 6.0 %    Basophils % 0.5 0.0 - 2.0 %    Neutrophils Absolute 9.81 (H) 1.60 - 5.50 x10*3/uL    Immature Granulocytes Absolute, Automated 0.20 0.00 - 0.50 x10*3/uL    Lymphocytes Absolute 1.14 0.80 - 3.00 x10*3/uL    Monocytes Absolute 1.81  (H) 0.05 - 0.80 x10*3/uL    Eosinophils Absolute 0.07 0.00 - 0.40 x10*3/uL    Basophils Absolute 0.06 0.00 - 0.10 x10*3/uL   Renal Function Panel   Result Value Ref Range    Glucose 103 (H) 65 - 99 mg/dL    Sodium 138 133 - 145 mmol/L    Potassium 3.6 3.4 - 5.1 mmol/L    Chloride 102 97 - 107 mmol/L    Bicarbonate 25 24 - 31 mmol/L    Urea Nitrogen 30 (H) 8 - 25 mg/dL    Creatinine 0.70 0.40 - 1.60 mg/dL    eGFR 80 >60 mL/min/1.73m*2    Calcium 9.1 8.5 - 10.4 mg/dL    Phosphorus 2.2 (L) 2.5 - 4.5 mg/dL    Albumin 2.9 (L) 3.5 - 5.0 g/dL    Anion Gap 11 <=19 mmol/L   Protime-INR   Result Value Ref Range    Protime 31.8 (H) 9.3 - 12.7 seconds    INR 3.2 (H) 0.9 - 1.2   Hemoglobin A1c   Result Value Ref Range    Hemoglobin A1C 5.7 (H) See below %    Estimated Average Glucose 117 Not Established mg/dL   POCT GLUCOSE   Result Value Ref Range    POCT Glucose 111 (H) 74 - 99 mg/dL      Assessment/Plan      Principal Problem:    Pneumothorax    MARIA DEL ROSARIO-resolved  Creatinine 0.7 this morning, baseline is between 0.9-1  Nephrology have signed off  Renally dose meds, avoid nephrotoxic medications  Monitor BMP      Acute hypoxic respiratory failure  Pneumothorax  Currently on 2L oxygen via NC, wean as tolerated  Secondary to above  Monitor respiratory status  Chest tube removed on 5/8/2024  General surgery managing  Continue to encourage incentive spirometer      Fall   Multiple rib fractures   Fall precautions  PT/OT/out of bed  Patient and daughter now agreeable to SNF placement  Pain management, incentive spirometer    Atrial fibrillation  S/p recent pacemaker placement 4/19/24  Have restarted Coumadin   Monitor on tele  Daily PT/INR, today 3.2, will hold today's Coumadin and recheck tomorrow  Continue metoprolol     Diabetes Mellitus  A1c pending  POC blood sugar checks ACHS  Holding home metformin for now  Sliding scale insulin  Hypoglycemia protocol    Chronic Diastolic Heart Failure  No evidence of acute exacerbation at this  time  Daily weights  Monitor I/O  Resume home metoprolol with parameters    DVT/GI  SCDs, H2 blocker       5/6/2024: With sudden increase in creatinine and phosphorus today.  Low urine output overnight, bladder scan demonstrated 120 mLs but per nursing was unable to visualize bladder completely.  Will insert Morales catheter and consult nephrology.  Also with distended, generalized lower abdominal/suprapubic pain.  CT abdomen pelvis has been ordered.  CBC demonstrates leukocytosis this morning, UA/culture, blood cultures have been ordered.  Consulted infectious disease.  Will continue to follow.  5/7/24: Restarted coumadin yesterday, monitoring INR. MARIA DEL ROSARIO and leukocytosis improving from yesterday. Family declining SNF. Will continue to follow.  5/8/2024: MARIA DEL ROSARIO resolved.  Resolved leukocytosis.  Patient still complaining of weakness, declining SNF placement.  Still with chest tube.  Overall continues to improve clinically.  Will continue to follow.  5/9/2024: Chest tube removed yesterday.  Continues to improve clinically.  Will attempt to wean off oxygen today.  Have encouraged IC/OOB with nursing and patient/family. Patient and family now agreeable to SNF placement, case management managing.  Will continue to follow.  5/10/2024: Continues to improve clinically.  Mild leukocytosis noted on today's labs, patient afebrile.  Blood cultures negative to date.  Urine culture shows no significant growth.  Chest x-ray and KUB unremarkable.  Patient states that she is feeling better.  Have communicated with nursing to wean off oxygen today.  Have continued to encourage patient to use incentive spirometry.  Continue to encourage out of bed/activity as tolerated.  Patient's daughter has provided a list of skilled nursing facilities to case management.  No absolute objection to discharge at this time.    Ruth Ann Marsh, APRN-CNP

## 2024-05-10 NOTE — PROGRESS NOTES
05/10/24 1516   Discharge Planning   Living Arrangements Spouse/significant other   Has discharge transport been arranged? Yes   What day is the transport expected? 05/10/24   What time is the transport expected? 1700     7000 Completed   AVS and goldenrod sent to facility   Daughter  updated   RN made aware and given report number

## 2024-05-15 DIAGNOSIS — R00.1 BRADYCARDIA: ICD-10-CM

## 2024-05-15 DIAGNOSIS — Z95.0 PACEMAKER: Primary | ICD-10-CM

## 2024-05-16 ENCOUNTER — NURSING HOME VISIT (OUTPATIENT)
Dept: POST ACUTE CARE | Facility: EXTERNAL LOCATION | Age: 89
End: 2024-05-16
Payer: MEDICARE

## 2024-05-16 VITALS
HEART RATE: 85 BPM | DIASTOLIC BLOOD PRESSURE: 88 MMHG | WEIGHT: 129.2 LBS | OXYGEN SATURATION: 96 % | SYSTOLIC BLOOD PRESSURE: 164 MMHG | RESPIRATION RATE: 18 BRPM | TEMPERATURE: 98 F | BODY MASS INDEX: 22.17 KG/M2

## 2024-05-16 DIAGNOSIS — S27.0XXD TRAUMATIC PNEUMOTHORAX, SUBSEQUENT ENCOUNTER: Primary | ICD-10-CM

## 2024-05-16 DIAGNOSIS — S22.41XD CLOSED FRACTURE OF MULTIPLE RIBS OF RIGHT SIDE WITH ROUTINE HEALING, SUBSEQUENT ENCOUNTER: ICD-10-CM

## 2024-05-16 DIAGNOSIS — I48.91 ATRIAL FIBRILLATION BY ELECTROCARDIOGRAM (MULTI): ICD-10-CM

## 2024-05-16 DIAGNOSIS — I10 HYPERTENSION, UNSPECIFIED TYPE: ICD-10-CM

## 2024-05-16 DIAGNOSIS — E11.9 TYPE 2 DIABETES MELLITUS WITHOUT COMPLICATION, WITHOUT LONG-TERM CURRENT USE OF INSULIN (MULTI): ICD-10-CM

## 2024-05-16 DIAGNOSIS — E87.6 HYPOKALEMIA: ICD-10-CM

## 2024-05-16 PROCEDURE — 99309 SBSQ NF CARE MODERATE MDM 30: CPT | Performed by: PHYSICIAN ASSISTANT

## 2024-05-16 ASSESSMENT — ENCOUNTER SYMPTOMS
VOMITING: 0
DIZZINESS: 0
WHEEZING: 0
FEVER: 0
CONSTIPATION: 0
COUGH: 0
DYSURIA: 0
NAUSEA: 0
NERVOUS/ANXIOUS: 0
HEADACHES: 0
ABDOMINAL PAIN: 0
HEMATURIA: 0
FREQUENCY: 0
DIARRHEA: 0
CONFUSION: 0
WEAKNESS: 0
SHORTNESS OF BREATH: 0
TREMORS: 0
APPETITE CHANGE: 0
CHILLS: 0

## 2024-05-16 NOTE — ASSESSMENT & PLAN NOTE
Treated with right sided chest tube and has resolved.  She has weaned off oxygen and is stable on room air.

## 2024-05-16 NOTE — ASSESSMENT & PLAN NOTE
Rate controlled with metoprolol and anticoagulated with coumadin.  INR 1.8 yesterday.  Coumadin had been on hold.  Recheck PT/INR in am. Monitor inr and adjust coumadin as needed.

## 2024-05-16 NOTE — LETTER
Patient: Audelia Virgen  : 3/24/1930    Encounter Date: 2024      Subjective  [id number] : Audelia Virgen is a 89 y.o. female admitted to [facility] for rehab. No chief complaint on file..  HPI  Pt with h/o afib, pacemaker, diabetes and CHF treated for a fall with multiple right sided rib fractures and acute pneumonthorax.  She was treated with right sided chest tube and symptoms resolved.   She was on oxygen for acute respiratory failure but has since weaned off oxygen and is stable on room air.  She denies any shortness of breath or cough.   She reports that her pain is well controlled.  She no longer wants the tylenol routine and has asked for it to be changed back to prn.  She offers no new complaints or concerns.  She had routine labs today which showed her potassium is low at 3.2.  She states that her appetite has improved.  She denies any abdominal pain.  No n/v/d/c.   She has no lower leg edema.  She lives alone.  Code status is full code.   Review of Systems   Constitutional:  Negative for appetite change, chills and fever.   Respiratory:  Negative for cough, shortness of breath and wheezing.    Cardiovascular:  Negative for chest pain and leg swelling.   Gastrointestinal:  Negative for abdominal pain, constipation, diarrhea, nausea and vomiting.   Genitourinary:  Negative for dysuria, frequency and hematuria.   Neurological:  Negative for dizziness, tremors, weakness and headaches.   Psychiatric/Behavioral:  Negative for confusion. The patient is not nervous/anxious.    All other systems reviewed and are negative.      Objective  /88   Pulse 85   Temp 36.7 °C (98 °F)   Resp 18   Wt 58.6 kg (129 lb 3.2 oz)   SpO2 96%   BMI 22.17 kg/m²    Physical Exam  Constitutional:       General: She is not in acute distress.  Eyes:      Conjunctiva/sclera: Conjunctivae normal.      Pupils: Pupils are equal, round, and reactive to light.   Cardiovascular:      Rate and Rhythm: Normal rate and  "regular rhythm.      Heart sounds: No murmur heard.  Pulmonary:      Effort: Pulmonary effort is normal.      Breath sounds: No wheezing, rhonchi or rales.   Abdominal:      General: Abdomen is flat. Bowel sounds are normal. There is no distension.      Palpations: Abdomen is soft. There is no mass.      Tenderness: There is no abdominal tenderness.   Musculoskeletal:         General: No swelling. Normal range of motion.   Skin:     General: Skin is warm and dry.      Findings: No rash.   Neurological:      General: No focal deficit present.      Mental Status: She is alert and oriented to person, place, and time. Mental status is at baseline.       No lab exists for component: \"CBC BMP\"  Assessment/Plan  Problem List Items Addressed This Visit             ICD-10-CM    Atrial fibrillation by electrocardiogram (Multi) I48.91     Rate controlled with metoprolol and anticoagulated with coumadin.  INR 1.8 yesterday.  Coumadin had been on hold.  Recheck PT/INR in am. Monitor inr and adjust coumadin as needed.          Multiple closed fractures of ribs of right side S22.41XA     Pain has improved.  Was on routine tylenol - pt requesting tylenol be changed to prn.          Pneumothorax, traumatic - Primary S27.0XXA     Treated with right sided chest tube and has resolved.  She has weaned off oxygen and is stable on room air.          Type 2 diabetes mellitus without complication, without long-term current use of insulin (Multi) E11.9     Continue metformin.  Monitor blood sugars and adjust meds as needed         HTN (hypertension) I10     Continue losartan.  Monitor blood pressures and adjust meds as needed.          Hypokalemia E87.6     Potassium 3.2 on today's labs.  Start potassium chloride 20 MEQ daily  monitor bmp                Time spent: 30 min in review of chart, labs and orders, consultation with pt and documentation.       Electronically Signed By: Sandra Sams PA-C   5/16/24  8:03 PM  "

## 2024-05-16 NOTE — PROGRESS NOTES
Subjective   [id number] : Audelia Virgen is a 89 y.o. female admitted to [facility] for rehab. No chief complaint on file..  HPI  Pt with h/o afib, pacemaker, diabetes and CHF treated for a fall with multiple right sided rib fractures and acute pneumonthorax.  She was treated with right sided chest tube and symptoms resolved.   She was on oxygen for acute respiratory failure but has since weaned off oxygen and is stable on room air.  She denies any shortness of breath or cough.   She reports that her pain is well controlled.  She no longer wants the tylenol routine and has asked for it to be changed back to prn.  She offers no new complaints or concerns.  She had routine labs today which showed her potassium is low at 3.2.  She states that her appetite has improved.  She denies any abdominal pain.  No n/v/d/c.   She has no lower leg edema.  She lives alone.  Code status is full code.   Review of Systems   Constitutional:  Negative for appetite change, chills and fever.   Respiratory:  Negative for cough, shortness of breath and wheezing.    Cardiovascular:  Negative for chest pain and leg swelling.   Gastrointestinal:  Negative for abdominal pain, constipation, diarrhea, nausea and vomiting.   Genitourinary:  Negative for dysuria, frequency and hematuria.   Neurological:  Negative for dizziness, tremors, weakness and headaches.   Psychiatric/Behavioral:  Negative for confusion. The patient is not nervous/anxious.    All other systems reviewed and are negative.      Objective   /88   Pulse 85   Temp 36.7 °C (98 °F)   Resp 18   Wt 58.6 kg (129 lb 3.2 oz)   SpO2 96%   BMI 22.17 kg/m²    Physical Exam  Constitutional:       General: She is not in acute distress.  Eyes:      Conjunctiva/sclera: Conjunctivae normal.      Pupils: Pupils are equal, round, and reactive to light.   Cardiovascular:      Rate and Rhythm: Normal rate and regular rhythm.      Heart sounds: No murmur heard.  Pulmonary:       "Effort: Pulmonary effort is normal.      Breath sounds: No wheezing, rhonchi or rales.   Abdominal:      General: Abdomen is flat. Bowel sounds are normal. There is no distension.      Palpations: Abdomen is soft. There is no mass.      Tenderness: There is no abdominal tenderness.   Musculoskeletal:         General: No swelling. Normal range of motion.   Skin:     General: Skin is warm and dry.      Findings: No rash.   Neurological:      General: No focal deficit present.      Mental Status: She is alert and oriented to person, place, and time. Mental status is at baseline.       No lab exists for component: \"CBC BMP\"  Assessment/Plan   Problem List Items Addressed This Visit             ICD-10-CM    Atrial fibrillation by electrocardiogram (Multi) I48.91     Rate controlled with metoprolol and anticoagulated with coumadin.  INR 1.8 yesterday.  Coumadin had been on hold.  Recheck PT/INR in am. Monitor inr and adjust coumadin as needed.          Multiple closed fractures of ribs of right side S22.41XA     Pain has improved.  Was on routine tylenol - pt requesting tylenol be changed to prn.          Pneumothorax, traumatic - Primary S27.0XXA     Treated with right sided chest tube and has resolved.  She has weaned off oxygen and is stable on room air.          Type 2 diabetes mellitus without complication, without long-term current use of insulin (Multi) E11.9     Continue metformin.  Monitor blood sugars and adjust meds as needed         HTN (hypertension) I10     Continue losartan.  Monitor blood pressures and adjust meds as needed.          Hypokalemia E87.6     Potassium 3.2 on today's labs.  Start potassium chloride 20 MEQ daily  monitor bmp                Time spent: 30 min in review of chart, labs and orders, consultation with pt and documentation.   "

## 2024-05-17 NOTE — DOCUMENTATION CLARIFICATION NOTE
"    PATIENT:               KOLBY PHAN  ACCT #:                  [id number]  MRN:                       [id number]  :                       3/24/1930  ADMIT DATE:       2024 1:17 AM  DISCH DATE:        2024 7:16 AM  RESPONDING PROVIDER #:        [id number]          PROVIDER RESPONSE TEXT:    Acute hypoxic respiratory insufficiency without respiratory failure as evidenced by    CDI QUERY TEXT:    Clarification    Instruction:    Based on your assessment of the patient and the clinical information, please provide the requested documentation by clicking on the appropriate radio button and enter any additional information if prompted.    Question: Please clarify diagnosis of respiratory failure as      When answering this query, please exercise your independent professional judgment. The fact that a question is being asked, does not imply that any particular answer is desired or expected.    The patient's clinical indicators include:  Clinical Information:  89 year old with Pneumothorax/Hemothorax, Multiple rib fractures  after mechanical fall    Documented Diagnosis:  24- H/P \"Acute hypoxic respiratory insufficiency without respiratory failure\".  24- Hospitalist Consult- \"Acute hypoxic respiratory failure\".    Clinical Indicators and Documentation: 24 - ER Record  -Vital Signs: 36.9, 91, 18, 174/105, 89% on room air  -ABG results: not ordered  -Physical Exam Findings:  -Breath sounds: \"Lungs: clear to ausculatation bilaterally\".  -Distress: \"No acute respiratory distress, speaking in full sentences without difficulty\".  -Cyanosis: None noted  -Oxygen Therapy: Room Air- 2LNC-4L NC  -Pulmonary Consult: Not ordered    Treatment:  Chest tube insertion  02 Therapy  Pulmonary Hygeine    Risk Factors:  Elderly  Pneumothorax/Hemothorax, Rib Fractures  Options provided:  -- Acute Respiratory Failure ruled out after further workup  -- Acute Respiratory Failure as evidenced by, Please specify additional " information below  -- Acute hypoxic respiratory insufficiency without respiratory failure was Ruled Out  -- Acute hypoxic respiratory insufficiency without respiratory failure, Please specify  -- Other - I will add my own diagnosis  -- Refer to Clinical Documentation Reviewer    Query created by: Elva Veronica on 5/13/2024 2:49 PM      Electronically signed by:  JULIA WINSTON 5/17/2024 9:15 AM

## 2024-05-22 ENCOUNTER — TELEPHONE (OUTPATIENT)
Dept: ADMISSION | Facility: HOSPITAL | Age: 89
End: 2024-05-22

## 2024-05-23 ENCOUNTER — NURSING HOME VISIT (OUTPATIENT)
Dept: POST ACUTE CARE | Facility: EXTERNAL LOCATION | Age: 89
End: 2024-05-23
Payer: MEDICARE

## 2024-05-23 VITALS
BODY MASS INDEX: 21.72 KG/M2 | HEART RATE: 84 BPM | WEIGHT: 126.6 LBS | OXYGEN SATURATION: 97 % | TEMPERATURE: 98.7 F | SYSTOLIC BLOOD PRESSURE: 139 MMHG | DIASTOLIC BLOOD PRESSURE: 87 MMHG | RESPIRATION RATE: 18 BRPM

## 2024-05-23 DIAGNOSIS — S22.41XD CLOSED FRACTURE OF MULTIPLE RIBS OF RIGHT SIDE WITH ROUTINE HEALING, SUBSEQUENT ENCOUNTER: ICD-10-CM

## 2024-05-23 DIAGNOSIS — I10 HYPERTENSION, UNSPECIFIED TYPE: ICD-10-CM

## 2024-05-23 DIAGNOSIS — S27.0XXD TRAUMATIC PNEUMOTHORAX, SUBSEQUENT ENCOUNTER: Primary | ICD-10-CM

## 2024-05-23 DIAGNOSIS — E11.9 TYPE 2 DIABETES MELLITUS WITHOUT COMPLICATION, WITHOUT LONG-TERM CURRENT USE OF INSULIN (MULTI): ICD-10-CM

## 2024-05-23 DIAGNOSIS — E87.6 HYPOKALEMIA: ICD-10-CM

## 2024-05-23 DIAGNOSIS — J18.9 PNEUMONIA DUE TO INFECTIOUS ORGANISM, UNSPECIFIED LATERALITY, UNSPECIFIED PART OF LUNG: ICD-10-CM

## 2024-05-23 DIAGNOSIS — I48.91 ATRIAL FIBRILLATION BY ELECTROCARDIOGRAM (MULTI): ICD-10-CM

## 2024-05-23 PROCEDURE — 99309 SBSQ NF CARE MODERATE MDM 30: CPT | Performed by: PHYSICIAN ASSISTANT

## 2024-05-23 ASSESSMENT — ENCOUNTER SYMPTOMS
ABDOMINAL PAIN: 0
SHORTNESS OF BREATH: 0
CONFUSION: 0
NERVOUS/ANXIOUS: 0
FREQUENCY: 0
NAUSEA: 0
APPETITE CHANGE: 0
CHILLS: 0
HEMATURIA: 0
DIZZINESS: 0
HEADACHES: 0
TREMORS: 0
COUGH: 0
DYSURIA: 0
FEVER: 0
DIARRHEA: 0
CONSTIPATION: 0
WEAKNESS: 0
WHEEZING: 0
VOMITING: 0

## 2024-05-23 NOTE — PROGRESS NOTES
Subjective   [id number] : Audelia Virgen is a 89 y.o. female admitted to [facility] for rehab. No chief complaint on file..  HPI  Pt with h/o afib, pacemaker, diabetes and CHF treated for a fall with multiple right sided rib fractures and acute pneumonthorax.   Pt currently on course of levaquin for pneumonia with leukocytosis.   She was not feeling well on Monday and was found to be hypoxic.  Chest x-ray was done and she was seen by Dr Rehman.   She is seen while sitting up in wheelchair and she reports that she feels fine today.  She denies any shortness of breath or cough.  She has been on 2 liters of oxygen since monday and is stable with a pulse ox of 97%.   She has requested to discharge home tomorrow.  Nursing did attempt to wean off oxygen today.  Pt pulse ox dropped to 85% on room air.  She will require oxygen for home use.  She reports no pain.  She is upset that she is still on oxygen.  She offers no new complaints or concerns.  She had routine labs today which were reviewed.  Her WBC is back in the normal range.   She denies any abdominal pain.  No n/v/d/c.   She has no lower leg edema.  She lives alone.  Code status is full code.   Review of Systems   Constitutional:  Negative for appetite change, chills and fever.   Respiratory:  Negative for cough, shortness of breath and wheezing.    Cardiovascular:  Negative for chest pain and leg swelling.   Gastrointestinal:  Negative for abdominal pain, constipation, diarrhea, nausea and vomiting.   Genitourinary:  Negative for dysuria, frequency and hematuria.   Neurological:  Negative for dizziness, tremors, weakness and headaches.   Psychiatric/Behavioral:  Negative for confusion. The patient is not nervous/anxious.    All other systems reviewed and are negative.      Objective   /87   Pulse 84   Temp 37.1 °C (98.7 °F)   Resp 18   Wt 57.4 kg (126 lb 9.6 oz)   SpO2 97%   BMI 21.72 kg/m²    Physical Exam  Constitutional:       General: She is not  "in acute distress.  Eyes:      Conjunctiva/sclera: Conjunctivae normal.      Pupils: Pupils are equal, round, and reactive to light.   Cardiovascular:      Rate and Rhythm: Normal rate and regular rhythm.      Heart sounds: No murmur heard.  Pulmonary:      Effort: Pulmonary effort is normal.      Breath sounds: Wheezing (left upper lobe) present. No rhonchi or rales.   Abdominal:      General: Abdomen is flat. Bowel sounds are normal. There is no distension.      Palpations: Abdomen is soft. There is no mass.      Tenderness: There is no abdominal tenderness.   Musculoskeletal:         General: No swelling. Normal range of motion.   Skin:     General: Skin is warm and dry.      Findings: No rash.   Neurological:      General: No focal deficit present.      Mental Status: She is alert and oriented to person, place, and time. Mental status is at baseline.       No lab exists for component: \"CBC BMP\"  Assessment/Plan   Atrial fibrillation by electrocardiogram (Multi) I48.91        Rate controlled with metoprolol and anticoagulated with coumadin.  INR 1.8 today  She is on coumadin 5mg daily.   Coumadin was just increased and she is on antibiotic.  Recheck PT/INR in am. Monitor inr and adjust coumadin as needed.            Multiple closed fractures of ribs of right side S22.41XA       Pain has improved.  Was on routine tylenol - pt requesting tylenol be changed to prn.            Pneumothorax, traumatic - Primary S27.0XXA       Treated with right sided chest tube and has resolved.  Discharge planning- she has requested discharge home tomorrow 5/24.  Oxygen has been ordered for home use.   Home health for RN/PT/OT recommended.           Type 2 diabetes mellitus without complication, without long-term current use of insulin (Multi) E11.9       Continue metformin.  Monitor blood sugars and adjust meds as needed           HTN (hypertension) I10       Continue losartan.  Monitor blood pressures and adjust meds as needed.     "        Hypokalemia E87.6       Potassium 4.5 on today's labs.  On potassium chloride 20 MEQ daily  monitor bmp           Pneumonia:   continues on course of levaquin.  Currently stable on 2liters of oxygen.   She will require oxygen for home use.  Order for oxygen written.  Leukocytosis has resolved.           Time spent: 30 min in review of chart, labs and orders, consultation with pt and documentation.

## 2024-05-23 NOTE — LETTER
Patient: Audelia Virgen  : 3/24/1930    Encounter Date: 2024      Subjective   [id number] : Audelia Virgen is a 89 y.o. female admitted to [facility] for rehab. No chief complaint on file..  HPI  Pt with h/o afib, pacemaker, diabetes and CHF treated for a fall with multiple right sided rib fractures and acute pneumonthorax.   Pt currently on course of levaquin for pneumonia with leukocytosis.   She was not feeling well on Monday and was found to be hypoxic.  Chest x-ray was done and she was seen by Dr Rehman.   She is seen while sitting up in wheelchair and she reports that she feels fine today.  She denies any shortness of breath or cough.  She has been on 2 liters of oxygen since monday and is stable with a pulse ox of 97%.   She has requested to discharge home tomorrow.  Nursing did attempt to wean off oxygen today.  Pt pulse ox dropped to 85% on room air.  She will require oxygen for home use.  She reports no pain.  She is upset that she is still on oxygen.  She offers no new complaints or concerns.  She had routine labs today which were reviewed.  Her WBC is back in the normal range.   She denies any abdominal pain.  No n/v/d/c.   She has no lower leg edema.  She lives alone.  Code status is full code.   Review of Systems   Constitutional:  Negative for appetite change, chills and fever.   Respiratory:  Negative for cough, shortness of breath and wheezing.    Cardiovascular:  Negative for chest pain and leg swelling.   Gastrointestinal:  Negative for abdominal pain, constipation, diarrhea, nausea and vomiting.   Genitourinary:  Negative for dysuria, frequency and hematuria.   Neurological:  Negative for dizziness, tremors, weakness and headaches.   Psychiatric/Behavioral:  Negative for confusion. The patient is not nervous/anxious.    All other systems reviewed and are negative.      Objective   /87   Pulse 84   Temp 37.1 °C (98.7 °F)   Resp 18   Wt 57.4 kg (126 lb 9.6 oz)   SpO2 97%   BMI  "21.72 kg/m²    Physical Exam  Constitutional:       General: She is not in acute distress.  Eyes:      Conjunctiva/sclera: Conjunctivae normal.      Pupils: Pupils are equal, round, and reactive to light.   Cardiovascular:      Rate and Rhythm: Normal rate and regular rhythm.      Heart sounds: No murmur heard.  Pulmonary:      Effort: Pulmonary effort is normal.      Breath sounds: Wheezing (left upper lobe) present. No rhonchi or rales.   Abdominal:      General: Abdomen is flat. Bowel sounds are normal. There is no distension.      Palpations: Abdomen is soft. There is no mass.      Tenderness: There is no abdominal tenderness.   Musculoskeletal:         General: No swelling. Normal range of motion.   Skin:     General: Skin is warm and dry.      Findings: No rash.   Neurological:      General: No focal deficit present.      Mental Status: She is alert and oriented to person, place, and time. Mental status is at baseline.       No lab exists for component: \"CBC BMP\"  Assessment/Plan   Atrial fibrillation by electrocardiogram (Multi) I48.91        Rate controlled with metoprolol and anticoagulated with coumadin.  INR 1.8 today  She is on coumadin 5mg daily.   Coumadin was just increased and she is on antibiotic.  Recheck PT/INR in am. Monitor inr and adjust coumadin as needed.            Multiple closed fractures of ribs of right side S22.41XA       Pain has improved.  Was on routine tylenol - pt requesting tylenol be changed to prn.            Pneumothorax, traumatic - Primary S27.0XXA       Treated with right sided chest tube and has resolved.  Discharge planning- she has requested discharge home tomorrow 5/24.  Oxygen has been ordered for home use.   Home health for RN/PT/OT recommended.           Type 2 diabetes mellitus without complication, without long-term current use of insulin (Multi) E11.9       Continue metformin.  Monitor blood sugars and adjust meds as needed           HTN (hypertension) I10       " Continue losartan.  Monitor blood pressures and adjust meds as needed.            Hypokalemia E87.6       Potassium 4.5 on today's labs.  On potassium chloride 20 MEQ daily  monitor bmp           Pneumonia:   continues on course of levaquin.  Currently stable on 2liters of oxygen.   She will require oxygen for home use.  Order for oxygen written.  Leukocytosis has resolved.           Time spent: 30 min in review of chart, labs and orders, consultation with pt and documentation.       Electronically Signed By: Sandra Sams PA-C   5/23/24  1:21 PM

## 2024-12-11 ENCOUNTER — HOSPITAL ENCOUNTER (OUTPATIENT)
Dept: CARDIOLOGY | Facility: CLINIC | Age: 89
Discharge: HOME | End: 2024-12-11
Payer: MEDICARE

## 2024-12-11 DIAGNOSIS — Z95.0 PACEMAKER: ICD-10-CM

## 2024-12-11 DIAGNOSIS — R00.1 BRADYCARDIA: ICD-10-CM

## 2024-12-11 PROCEDURE — 93296 REM INTERROG EVL PM/IDS: CPT

## 2025-01-01 ENCOUNTER — APPOINTMENT (OUTPATIENT)
Dept: RADIOLOGY | Facility: HOSPITAL | Age: OVER 89
DRG: 064 | End: 2025-01-01
Payer: MEDICARE

## 2025-01-01 ENCOUNTER — HOSPITAL ENCOUNTER (INPATIENT)
Facility: HOSPITAL | Age: OVER 89
LOS: 1 days | DRG: 064 | End: 2025-05-21
Attending: EMERGENCY MEDICINE | Admitting: INTERNAL MEDICINE
Payer: MEDICARE

## 2025-01-01 VITALS
OXYGEN SATURATION: 87 % | SYSTOLIC BLOOD PRESSURE: 137 MMHG | HEIGHT: 64 IN | HEART RATE: 101 BPM | BODY MASS INDEX: 23.71 KG/M2 | RESPIRATION RATE: 34 BRPM | WEIGHT: 138.89 LBS | DIASTOLIC BLOOD PRESSURE: 110 MMHG | TEMPERATURE: 99 F

## 2025-01-01 DIAGNOSIS — R41.89 UNRESPONSIVE: ICD-10-CM

## 2025-01-01 DIAGNOSIS — J96.01 ACUTE RESPIRATORY FAILURE WITH HYPOXIA: Primary | ICD-10-CM

## 2025-01-01 DIAGNOSIS — I63.59 CEREBROVASCULAR ACCIDENT (CVA) DUE TO OCCLUSION OF OTHER CEREBRAL ARTERY: ICD-10-CM

## 2025-01-01 LAB
ALBUMIN SERPL BCP-MCNC: 4.4 G/DL (ref 3.4–5)
ALP SERPL-CCNC: 59 U/L (ref 33–136)
ALT SERPL W P-5'-P-CCNC: 20 U/L (ref 7–45)
ANION GAP BLDV CALCULATED.4IONS-SCNC: 10 MMOL/L (ref 10–25)
ANION GAP SERPL CALCULATED.3IONS-SCNC: 18 MMOL/L (ref 10–20)
AST SERPL W P-5'-P-CCNC: 48 U/L (ref 9–39)
BASE EXCESS BLDV CALC-SCNC: 5.7 MMOL/L (ref -2–3)
BASOPHILS # BLD AUTO: 0.03 X10*3/UL (ref 0–0.1)
BASOPHILS NFR BLD AUTO: 0.2 %
BILIRUB SERPL-MCNC: 1.5 MG/DL (ref 0–1.2)
BODY TEMPERATURE: 37 DEGREES CELSIUS
BUN SERPL-MCNC: 31 MG/DL (ref 6–23)
CA-I BLDV-SCNC: 1.19 MMOL/L (ref 1.1–1.33)
CALCIUM SERPL-MCNC: 9.7 MG/DL (ref 8.6–10.3)
CHLORIDE BLDV-SCNC: 100 MMOL/L (ref 98–107)
CHLORIDE SERPL-SCNC: 100 MMOL/L (ref 98–107)
CK SERPL-CCNC: 1224 U/L (ref 0–215)
CO2 SERPL-SCNC: 25 MMOL/L (ref 21–32)
CREAT SERPL-MCNC: 0.96 MG/DL (ref 0.5–1.05)
EGFRCR SERPLBLD CKD-EPI 2021: 55 ML/MIN/1.73M*2
EOSINOPHIL # BLD AUTO: 0.01 X10*3/UL (ref 0–0.4)
EOSINOPHIL NFR BLD AUTO: 0.1 %
ERYTHROCYTE [DISTWIDTH] IN BLOOD BY AUTOMATED COUNT: 14.5 % (ref 11.5–14.5)
GLUCOSE BLDV-MCNC: 152 MG/DL (ref 74–99)
GLUCOSE SERPL-MCNC: 149 MG/DL (ref 74–99)
HCO3 BLDV-SCNC: 30.6 MMOL/L (ref 22–26)
HCT VFR BLD AUTO: 44.8 % (ref 36–46)
HCT VFR BLD EST: 47 % (ref 36–46)
HGB BLD-MCNC: 15 G/DL (ref 12–16)
HGB BLDV-MCNC: 15.6 G/DL (ref 12–16)
IMM GRANULOCYTES # BLD AUTO: 0.09 X10*3/UL (ref 0–0.5)
IMM GRANULOCYTES NFR BLD AUTO: 0.5 % (ref 0–0.9)
INHALED O2 CONCENTRATION: 0 %
INR PPP: 2.1 (ref 0.9–1.2)
LACTATE BLDV-SCNC: 3.4 MMOL/L (ref 0.4–2)
LACTATE BLDV-SCNC: 3.4 MMOL/L (ref 0.4–2)
LACTATE SERPL-SCNC: 3.1 MMOL/L (ref 0.4–2)
LYMPHOCYTES # BLD AUTO: 1.33 X10*3/UL (ref 0.8–3)
LYMPHOCYTES NFR BLD AUTO: 6.9 %
MCH RBC QN AUTO: 31.7 PG (ref 26–34)
MCHC RBC AUTO-ENTMCNC: 33.5 G/DL (ref 32–36)
MCV RBC AUTO: 95 FL (ref 80–100)
MONOCYTES # BLD AUTO: 1.65 X10*3/UL (ref 0.05–0.8)
MONOCYTES NFR BLD AUTO: 8.6 %
NEUTROPHILS # BLD AUTO: 16.17 X10*3/UL (ref 1.6–5.5)
NEUTROPHILS NFR BLD AUTO: 83.7 %
NRBC BLD-RTO: 0 /100 WBCS (ref 0–0)
OXYHGB MFR BLDV: 57.9 % (ref 45–75)
PCO2 BLDV: 44 MM HG (ref 41–51)
PH BLDV: 7.45 PH (ref 7.33–7.43)
PLATELET # BLD AUTO: 178 X10*3/UL (ref 150–450)
PO2 BLDV: 35 MM HG (ref 35–45)
POTASSIUM BLDV-SCNC: 3.6 MMOL/L (ref 3.5–5.3)
POTASSIUM SERPL-SCNC: 3.5 MMOL/L (ref 3.5–5.3)
PROT SERPL-MCNC: 7.4 G/DL (ref 6.4–8.2)
PROTHROMBIN TIME: 21.4 SECONDS (ref 9.3–12.7)
RBC # BLD AUTO: 4.73 X10*6/UL (ref 4–5.2)
SAO2 % BLDV: 60 % (ref 45–75)
SODIUM BLDV-SCNC: 137 MMOL/L (ref 136–145)
SODIUM SERPL-SCNC: 139 MMOL/L (ref 136–145)
WBC # BLD AUTO: 19.3 X10*3/UL (ref 4.4–11.3)

## 2025-01-01 PROCEDURE — 83605 ASSAY OF LACTIC ACID: CPT | Performed by: CLINICAL NURSE SPECIALIST

## 2025-01-01 PROCEDURE — 83605 ASSAY OF LACTIC ACID: CPT | Performed by: EMERGENCY MEDICINE

## 2025-01-01 PROCEDURE — 87205 SMEAR GRAM STAIN: CPT | Mod: TRILAB | Performed by: CLINICAL NURSE SPECIALIST

## 2025-01-01 PROCEDURE — 96365 THER/PROPH/DIAG IV INF INIT: CPT

## 2025-01-01 PROCEDURE — 1210000001 HC SEMI-PRIVATE ROOM DAILY

## 2025-01-01 PROCEDURE — 71045 X-RAY EXAM CHEST 1 VIEW: CPT

## 2025-01-01 PROCEDURE — 71045 X-RAY EXAM CHEST 1 VIEW: CPT | Performed by: RADIOLOGY

## 2025-01-01 PROCEDURE — 36415 COLL VENOUS BLD VENIPUNCTURE: CPT | Performed by: EMERGENCY MEDICINE

## 2025-01-01 PROCEDURE — 36415 COLL VENOUS BLD VENIPUNCTURE: CPT | Performed by: CLINICAL NURSE SPECIALIST

## 2025-01-01 PROCEDURE — 82330 ASSAY OF CALCIUM: CPT | Performed by: EMERGENCY MEDICINE

## 2025-01-01 PROCEDURE — 2500000004 HC RX 250 GENERAL PHARMACY W/ HCPCS (ALT 636 FOR OP/ED): Mod: JZ | Performed by: EMERGENCY MEDICINE

## 2025-01-01 PROCEDURE — 82435 ASSAY OF BLOOD CHLORIDE: CPT | Performed by: EMERGENCY MEDICINE

## 2025-01-01 PROCEDURE — 85610 PROTHROMBIN TIME: CPT | Performed by: EMERGENCY MEDICINE

## 2025-01-01 PROCEDURE — 99291 CRITICAL CARE FIRST HOUR: CPT | Mod: 25 | Performed by: EMERGENCY MEDICINE

## 2025-01-01 PROCEDURE — 82550 ASSAY OF CK (CPK): CPT | Performed by: EMERGENCY MEDICINE

## 2025-01-01 PROCEDURE — 70450 CT HEAD/BRAIN W/O DYE: CPT

## 2025-01-01 PROCEDURE — 99291 CRITICAL CARE FIRST HOUR: CPT | Performed by: EMERGENCY MEDICINE

## 2025-01-01 PROCEDURE — 70450 CT HEAD/BRAIN W/O DYE: CPT | Performed by: RADIOLOGY

## 2025-01-01 PROCEDURE — 85025 COMPLETE CBC W/AUTO DIFF WBC: CPT | Performed by: EMERGENCY MEDICINE

## 2025-01-01 PROCEDURE — 96366 THER/PROPH/DIAG IV INF ADDON: CPT

## 2025-01-01 RX ORDER — MORPHINE SULFATE 10 MG/.5ML
5 SOLUTION ORAL EVERY 4 HOURS PRN
Status: DISCONTINUED | OUTPATIENT
Start: 2025-01-01 | End: 2025-01-01 | Stop reason: HOSPADM

## 2025-01-01 RX ORDER — PANTOPRAZOLE SODIUM 40 MG/10ML
40 INJECTION, POWDER, LYOPHILIZED, FOR SOLUTION INTRAVENOUS DAILY
Status: DISCONTINUED | OUTPATIENT
Start: 2025-01-01 | End: 2025-01-01 | Stop reason: HOSPADM

## 2025-01-01 RX ORDER — ONDANSETRON HYDROCHLORIDE 2 MG/ML
4 INJECTION, SOLUTION INTRAVENOUS EVERY 8 HOURS PRN
Status: DISCONTINUED | OUTPATIENT
Start: 2025-01-01 | End: 2025-01-01 | Stop reason: HOSPADM

## 2025-01-01 RX ORDER — VANCOMYCIN 1 G/200ML
1000 INJECTION, SOLUTION INTRAVENOUS ONCE
Status: DISCONTINUED | OUTPATIENT
Start: 2025-01-01 | End: 2025-01-01 | Stop reason: HOSPADM

## 2025-01-01 RX ORDER — ACETAMINOPHEN 325 MG/1
650 TABLET ORAL EVERY 4 HOURS PRN
Status: DISCONTINUED | OUTPATIENT
Start: 2025-01-01 | End: 2025-01-01 | Stop reason: HOSPADM

## 2025-01-01 RX ORDER — ONDANSETRON HYDROCHLORIDE 2 MG/ML
4 INJECTION, SOLUTION INTRAVENOUS ONCE
Status: DISCONTINUED | OUTPATIENT
Start: 2025-01-01 | End: 2025-01-01 | Stop reason: HOSPADM

## 2025-01-01 RX ORDER — ONDANSETRON 4 MG/1
4 TABLET, ORALLY DISINTEGRATING ORAL EVERY 8 HOURS PRN
Status: DISCONTINUED | OUTPATIENT
Start: 2025-01-01 | End: 2025-01-01 | Stop reason: HOSPADM

## 2025-01-01 RX ORDER — IPRATROPIUM BROMIDE AND ALBUTEROL SULFATE 2.5; .5 MG/3ML; MG/3ML
3 SOLUTION RESPIRATORY (INHALATION) EVERY 6 HOURS PRN
Status: DISCONTINUED | OUTPATIENT
Start: 2025-01-01 | End: 2025-01-01 | Stop reason: HOSPADM

## 2025-01-01 RX ORDER — DEXTROSE 50 % IN WATER (D50W) INTRAVENOUS SYRINGE
12.5
Status: DISCONTINUED | OUTPATIENT
Start: 2025-01-01 | End: 2025-01-01 | Stop reason: HOSPADM

## 2025-01-01 RX ORDER — DEXTROSE 50 % IN WATER (D50W) INTRAVENOUS SYRINGE
25
Status: DISCONTINUED | OUTPATIENT
Start: 2025-01-01 | End: 2025-01-01 | Stop reason: HOSPADM

## 2025-01-01 RX ORDER — LORAZEPAM 2 MG/ML
1 INJECTION INTRAMUSCULAR ONCE
Status: DISCONTINUED | OUTPATIENT
Start: 2025-01-01 | End: 2025-01-01 | Stop reason: HOSPADM

## 2025-01-01 RX ORDER — GUAIFENESIN 600 MG/1
600 TABLET, EXTENDED RELEASE ORAL EVERY 12 HOURS PRN
Status: DISCONTINUED | OUTPATIENT
Start: 2025-01-01 | End: 2025-01-01 | Stop reason: HOSPADM

## 2025-01-01 RX ORDER — ACETAMINOPHEN 160 MG/5ML
650 SOLUTION ORAL EVERY 4 HOURS PRN
Status: DISCONTINUED | OUTPATIENT
Start: 2025-01-01 | End: 2025-01-01 | Stop reason: HOSPADM

## 2025-01-01 RX ORDER — SODIUM CHLORIDE 9 MG/ML
50 INJECTION, SOLUTION INTRAVENOUS CONTINUOUS
Status: DISCONTINUED | OUTPATIENT
Start: 2025-01-01 | End: 2025-01-01 | Stop reason: HOSPADM

## 2025-01-01 RX ORDER — ACETAMINOPHEN 650 MG/1
650 SUPPOSITORY RECTAL EVERY 4 HOURS PRN
Status: DISCONTINUED | OUTPATIENT
Start: 2025-01-01 | End: 2025-01-01 | Stop reason: HOSPADM

## 2025-01-01 RX ORDER — GLYCOPYRROLATE 0.2 MG/ML
0.2 INJECTION INTRAMUSCULAR; INTRAVENOUS EVERY 4 HOURS PRN
Status: DISCONTINUED | OUTPATIENT
Start: 2025-01-01 | End: 2025-01-01 | Stop reason: HOSPADM

## 2025-01-01 RX ORDER — VANCOMYCIN HYDROCHLORIDE 1 G/20ML
INJECTION, POWDER, LYOPHILIZED, FOR SOLUTION INTRAVENOUS DAILY PRN
Status: DISCONTINUED | OUTPATIENT
Start: 2025-01-01 | End: 2025-01-01 | Stop reason: HOSPADM

## 2025-01-01 RX ORDER — HYOSCYAMINE SULFATE 0.12 MG/1
0.12 TABLET, ORALLY DISINTEGRATING ORAL EVERY 4 HOURS PRN
Status: DISCONTINUED | OUTPATIENT
Start: 2025-01-01 | End: 2025-01-01 | Stop reason: HOSPADM

## 2025-01-01 RX ORDER — MORPHINE SULFATE 4 MG/ML
4 INJECTION, SOLUTION INTRAMUSCULAR; INTRAVENOUS EVERY 4 HOURS PRN
Status: DISCONTINUED | OUTPATIENT
Start: 2025-01-01 | End: 2025-01-01 | Stop reason: HOSPADM

## 2025-01-01 RX ORDER — GUAIFENESIN/DEXTROMETHORPHAN 100-10MG/5
5 SYRUP ORAL EVERY 4 HOURS PRN
Status: DISCONTINUED | OUTPATIENT
Start: 2025-01-01 | End: 2025-01-01 | Stop reason: HOSPADM

## 2025-01-01 RX ORDER — HYDRALAZINE HYDROCHLORIDE 20 MG/ML
5 INJECTION INTRAMUSCULAR; INTRAVENOUS EVERY 6 HOURS PRN
Status: DISCONTINUED | OUTPATIENT
Start: 2025-01-01 | End: 2025-01-01 | Stop reason: HOSPADM

## 2025-01-01 RX ADMIN — PIPERACILLIN SODIUM AND TAZOBACTAM SODIUM 3.38 G: 3; .375 INJECTION, SOLUTION INTRAVENOUS at 23:11

## 2025-05-20 PROBLEM — R41.89 UNRESPONSIVE: Status: ACTIVE | Noted: 2025-01-01

## 2025-05-20 PROBLEM — J96.01 ACUTE RESPIRATORY FAILURE WITH HYPOXIA: Status: ACTIVE | Noted: 2025-01-01

## 2025-05-21 PROBLEM — I63.312 CEREBROVASCULAR ACCIDENT (CVA) DUE TO THROMBOSIS OF LEFT MIDDLE CEREBRAL ARTERY (MULTI): Status: ACTIVE | Noted: 2025-01-01

## 2025-05-21 PROBLEM — Z51.5 END OF LIFE CARE: Status: ACTIVE | Noted: 2025-01-01

## 2025-05-21 PROBLEM — M62.82 RHABDOMYOLYSIS: Status: ACTIVE | Noted: 2025-01-01

## 2025-05-21 PROBLEM — D72.829 LEUKOCYTOSIS: Status: ACTIVE | Noted: 2025-01-01

## 2025-05-21 NOTE — ED PROCEDURE NOTE
Procedure  Critical Care    Performed by: Young Blankenship DO  Authorized by: Young Blankenship DO    Critical care provider statement:     Critical care time (minutes):  45    Critical care time was exclusive of:  Separately billable procedures and treating other patients    Critical care was necessary to treat or prevent imminent or life-threatening deterioration of the following conditions:  Respiratory failure    Critical care was time spent personally by me on the following activities:  Evaluation of patient's response to treatment, examination of patient, obtaining history from patient or surrogate, interpretation of cardiac output measurements, review of old charts, re-evaluation of patient's condition, pulse oximetry, ordering and review of radiographic studies, ordering and review of laboratory studies and ordering and performing treatments and interventions    Care discussed with: admitting provider                 Young Blankenship DO  05/21/25 0154

## 2025-05-21 NOTE — ED PROVIDER NOTES
Department of Emergency Medicine   ED  Provider Note  Admit Date/RoomTime: 5/20/2025 10:15 PM  ED Room: OTF03/OTF03        History of Present Illness:  Chief Complaint   Patient presents with    Altered Mental Status     Patient was found at [facility] in her bed when family requested a well check on the patient. Patient was unresponsive on arrival and put on a NRB with agonal respirations. Patient has unknown downtime and not able to communicate at this time. Patient had a round of emesis just prior to arrival. Family was notified by provider who are en route but just want the patient to be comfortable until they can arrive.          Audelia Virgen is a 89 y.o. female history of atrial fibrillation on Coumadin, diabetes, hypertension presents to the unresponsive COVID and coffee ground emesis.  Per EMS report, welfare check was done on patient after she was texting did not respond.  Last known well last night.    Review of Systems:   Patient unable to provide any information        --------------------------------------------- PAST HISTORY ---------------------------------------------  Past Medical History:  has a past medical history of Diabetes mellitus (Multi) and Hypertension.  Past Surgical History:  has a past surgical history that includes Mastectomy; Hysterectomy; and Cardiac electrophysiology procedure (N/A, 4/19/2024).  Social History:  reports that she has never smoked. She has never used smokeless tobacco. She reports current alcohol use. She reports that she does not use drugs.  Family History: family history includes No Known Problems in her father and mother.. Unless otherwise noted, family history is non contributory  The patient’s home medications have been reviewed.  Allergies: Patient has no known allergies.        ---------------------------------------------------PHYSICAL EXAM--------------------------------------    GENERAL APPEARANCE: Eyes open moving left upper extremity coffee-ground emesis  "noted to the cheek dried smells of urine underwear saturated with urine  VITAL SIGNS: Hypoxic 86%, nonrebreather respiratory rate 30 heart rate 95 blood pressure 188/91  HEENT: Normocephalic, atraumatic. Extraocular muscles are intact.  Right pupil 3 nonreactive left pupil 2 nonreactive.  Conjunctiva are pink. Negative scleral icterus. Mucous membranes are moist. Tongue in the midline. Pharynx was without erythema or exudates, uvula midline  NECK: Soft head turn to the left  CHEST: Nontender to palpation.  Coarse rhonchi tachypnea   HEART: S1, S2. Regular rate and rhythm. No murmurs, gallops or rubs.  Strong and equal pulses in the extremities.   ABDOMEN: Soft, , nondistended, positive bowel sounds, no palpable masses.  MUSCULCSKELETAL:   Peripheral edema +1 peripheral pulses intact.  Nonpurposeful  movement noted in the left arm  NEUROLOGICAL: Unresponsive nonpurposeful movement  IMMUNOLOGICAL: No lymphatic streaking noted   DERM: Pale          ------------------------- NURSING NOTES AND VITALS REVIEWED ---------------------------  The nursing notes within the ED encounter and vital signs as below have been reviewed by myself  BP (!) 137/110   Pulse (!) 101   Temp 37.2 °C (99 °F) (Skin)   Resp (!) 34   Ht 1.626 m (5' 4\")   Wt 63 kg (138 lb 14.2 oz)   SpO2 (!) 87%   BMI 23.84 kg/m²     Oxygen Saturation Interpretation: 86% on 10 L placed on nonrebreather 92%    The cardiac monitor revealed sinus tachycardia with a heart rate in the 100s as interpreted by me. The cardiac monitor was ordered secondary to the patient's heart rate and to monitor the patient for dysrhythmia.       The patient’s available past medical records and past encounters were reviewed.          -----------------------DIAGNOSTIC RESULTS------------------------  LABS:    Labs Reviewed   BLOOD CULTURE - Abnormal       Result Value    Blood Culture        Value: Identification and susceptibility testing to follow    Gram Stain Gram positive " cocci, clusters (*)    BLOOD CULTURE - Abnormal    Blood Culture        Value: Identification and susceptibility testing to follow    Gram Stain Gram positive cocci, clusters (*)    CBC WITH AUTO DIFFERENTIAL - Abnormal    WBC 19.3 (*)     nRBC 0.0      RBC 4.73      Hemoglobin 15.0      Hematocrit 44.8      MCV 95      MCH 31.7      MCHC 33.5      RDW 14.5      Platelets 178      Neutrophils % 83.7      Immature Granulocytes %, Automated 0.5      Lymphocytes % 6.9      Monocytes % 8.6      Eosinophils % 0.1      Basophils % 0.2      Neutrophils Absolute 16.17 (*)     Immature Granulocytes Absolute, Automated 0.09      Lymphocytes Absolute 1.33      Monocytes Absolute 1.65 (*)     Eosinophils Absolute 0.01      Basophils Absolute 0.03     PROTIME-INR - Abnormal    Protime 21.4 (*)     INR 2.1 (*)     Narrative:     INR Therapeutic Range: 2.0-3.5   COMPREHENSIVE METABOLIC PANEL - Abnormal    Glucose 149 (*)     Sodium 139      Potassium 3.5      Chloride 100      Bicarbonate 25      Anion Gap 18      Urea Nitrogen 31 (*)     Creatinine 0.96      eGFR 55 (*)     Calcium 9.7      Albumin 4.4      Alkaline Phosphatase 59      Total Protein 7.4      AST 48 (*)     Bilirubin, Total 1.5 (*)     ALT 20     BLOOD GAS VENOUS FULL PANEL - Abnormal    POCT pH, Venous 7.45 (*)     POCT pCO2, Venous 44      POCT pO2, Venous 35      POCT SO2, Venous 60      POCT Oxy Hemoglobin, Venous 57.9      POCT Hematocrit Calculated, Venous 47.0 (*)     POCT Sodium, Venous 137      POCT Potassium, Venous 3.6      POCT Chloride, Venous 100      POCT Ionized Calicum, Venous 1.19      POCT Glucose, Venous 152 (*)     POCT Lactate, Venous 3.4 (*)     POCT Base Excess, Venous 5.7 (*)     POCT HCO3 Calculated, Venous 30.6 (*)     POCT Hemoglobin, Venous 15.6      POCT Anion Gap, Venous 10.0      Patient Temperature 37.0      FiO2 0     BLOOD GAS LACTIC ACID, VENOUS - Abnormal    POCT Lactate, Venous 3.4 (*)    LACTATE - Abnormal    Lactate 3.1 (*)      Narrative:     Venipuncture immediately after or during the administration of Metamizole may lead to falsely low results. Testing should be performed immediately prior to Metamizole dosing.   CREATINE KINASE - Abnormal    Creatine Kinase 1,224 (*)    MRSA SURVEILLANCE FOR VANCOMYCIN DE-ESCALATION, PCR       As interpreted by me, the above displayed labs are abnormal. All other labs obtained during this visit were within normal range or not returned as of this dictation.        CT head wo IV contrast   Final Result   New, acute or acute to subacute large left MCA territory infarct.   Hyperdensity of the left middle cerebral artery M1 and M2 branches,   suggestive of thrombosis.        No acute intracranial hemorrhage.             MACRO:   Raquel Arteaga discussed the significance and urgency of this critical   finding by BUKA chat with read acknowledgement with  SHANDRA MARTINEZ on 5/20/2025 at 11:47 pm.  (**-RCF-**) Findings:  See   findings.        Signed by: Raquel Arteaga 5/20/2025 11:47 PM   Dictation workstation:   [id number]      XR chest 1 view   Final Result   New right perihilar and basilar airspace disease, suspicious for   pneumonia.        Small right pleural effusion.        Limited evaluation of the left lung base due to underpenetration.        MACRO:   None        Signed by: Raquel Arteaga 5/20/2025 11:41 PM   Dictation workstation:   [id number]              CT head wo IV contrast   Final Result   New, acute or acute to subacute large left MCA territory infarct.   Hyperdensity of the left middle cerebral artery M1 and M2 branches,   suggestive of thrombosis.        No acute intracranial hemorrhage.             MACRO:   Raquel Arteaga discussed the significance and urgency of this critical   finding by EPIC secure chat with read acknowledgement with  SHANDRA MARTINEZ on 5/20/2025 at 11:47 pm.  (**-RCF-**) Findings:  See   findings.        Signed by: Raquel Arteaga 5/20/2025 11:47 PM   Dictation  workstation:   [id number]      XR chest 1 view   Final Result   New right perihilar and basilar airspace disease, suspicious for   pneumonia.        Small right pleural effusion.        Limited evaluation of the left lung base due to underpenetration.        MACRO:   None        Signed by: Raquel Arteaga 5/20/2025 11:41 PM   Dictation workstation:   [id number]              ------------------------------ ED COURSE/MEDICAL DECISION MAKING----------------------  Medical Decision Making:   Exam: A medically appropriate exam performed, outlined above, given the known history and presentation.    History obtained from: Review of medical record EMS patient unable to provide any information due to critical state      Social Determinants of Health considered during this visit: Lives at home      PAST MEDICAL HISTORY/Chronic Conditions Affecting Care     has a past medical history of Diabetes mellitus (Multi) and Hypertension.       CC/HPI Summary, Social Determinants of health, Records Reviewed, DDx, testing done/not done, ED Course, Reassessment, disposition considerations/shared decision making with patient, consults, disposition:   Presents to the emergency department unresponsive coffee-ground emesis tachypnea hypoxia after well check  Plan  CBC, CMP, PT/INR, CT head, chest x-ray  DNR comfort measures confirmed with patient's family by attending physician    Medical Decision Making/Differential Diagnosis:  Differentials include not limited to upper versus lower GI bleed versus intracranial bleed versus viral illness versus pneumonia versus aspiration  Review lactate 3.4  Coag panel within normal limits  CK elevated 1.224  White blood cell count 19.3  Hemoglobin 15  Electrolytes unremarkable glucose 149  ST elevated  Normal creatinine  Patient presented for unresponsive episode with coffee-ground emesis.  Concern for sepsis started on antibiotic therapy lactate elevated elevated white blood cell count fluids and IV  antibiotics ordered.  CT of the head showed New, acute or acute to subacute large left MCA territory infarct.Hyperdensity of the left middle cerebral artery M1 and M2 branches, suggestive of thrombosis.  No acute intracranial hemorrhage patient is not anemic.  Electrolytes unremarkable.  Creatinine within normal limits.  AST and bili total slightly elevated.  CK elevated consistent with rhabdomyolysis.  Venous pH of 7.45 no signs of DKA.  Chest x-ray showedNew right perihilar and basilar airspace disease, suspicious for  pneumonia.Small right pleural effusion.  Limited evaluation of the left lung base due to underpenetration.  Patient changed to DNR comfort care measures only.  Patient seen evaluated with attending physician Dr. Blankenship   Impression  Acute respiratory failure with hypoxia  Pneumonia  Stroke  PROCEDURES  Unless otherwise noted below, none      CONSULTS:   None      ED Course as of 05/22/25 0339   Tue May 20, 2025   2222 I spoke with the patient's daughter after evaluating the patient.  She did confirm the patient is a DNR would not want to be on a ventilator.  I did let the daughter know that she is in critical condition at this time.  Based on this information patient will not be intubated she will be made comfortable in the emergency department.  I will still obtain imaging and laboratory studies to obtain more information. [KW]   2327 WBC(!): 19.3  Antibiotics ordered lactate 3.4, [TB]   2328 Concern for sepsis at this time white blood cell count elevated at 19.3 lactate elevated IV fluids ordered 1 L will do fluid resuscitation cautiously secondary to patient's age and concern for fluid overload she is tachycardic increased respiratory rate hypoxic concerning for aspiration.  Also Protonix ordered for concern of GI bleed [TB]   Wed May 21, 2025   0000 I performed a sepsis reperfusion exam on Audelia Virgen on 5/21/2025 000 AM    A targeted fluid bolus of 1 L was given, if adult patient did not  receive a 30cc/kg fluid bolus, the clinical rationale is concerns for fluid overload    CATRACHITO Gleason     No change in patient's clinical presentation with fluid resuscitation [TB]      ED Course User Index  [KW] Young PELLETIER DO Pattie  [TB] CATRACHITO Gleason         Diagnoses as of 25 0339   Acute respiratory failure with hypoxia   Unresponsive   Cerebrovascular accident (CVA) due to occlusion of other cerebral artery         This patient has remained hemodynamically stable during their ED course.      Critical Care: none       Counseling:  The emergency provider has spoken with the patient and discussed today’s results, in addition to providing specific details for the plan of care and counseling regarding the diagnosis and prognosis.  Questions are answered at this time and they are agreeable with the plan.         --------------------------------- IMPRESSION AND DISPOSITION ---------------------------------    IMPRESSION  1. Acute respiratory failure with hypoxia    2. Unresponsive    3. Cerebrovascular accident (CVA) due to occlusion of other cerebral artery        DISPOSITION  Disposition:   Patient condition is         NOTE: This report was transcribed using voice recognition software. Every effort was made to ensure accuracy; however, inadvertent computerized transcription errors may be present      CATRACHITO Gleason  25 0343

## 2025-05-21 NOTE — ED PROVIDER NOTES
This patient was seen by the advanced practice provider.  I have personally performed a substantive portion of the encounter.      I have seen and examined the patient; agree with the workup, evaluation, MDM, management and diagnosis.  The care plan has been discussed.       I personally saw the patient and made/approved the management plan and take responsibility for the patient management.       HPI:  89-year-old female presents from home.  Family normally talks to her daily we could not get of hold her for today so a well check was performed and she was found at home unresponsive.  No other history of provide the patient cannot provide any additional information.    Physical exam:  General: Unresponsive, moderate acute distress.  Head: Normocephalic, Atraumatic, evidence of coffee-ground emesis on cheek  Neck: Supple, trachea midline, no stridor  Skin: Warm and dry, no rashes   Lungs: Rales bilaterally moderate respiratory distress,   CV: Regular Rate Rhythm with no obvious murmurs gallops rubs noted, no jugular venous distention, no pedal edema   Abdomen: Soft, nondistended,   Neuro: Unresponsive    MDM:  Patient was seen immediate upon arrival to the ED.  Patient is 89-year-old.  She is in respiratory distress and would require intubation.  I contacted the patient's daughter who states that the patient is a DNR comfort care and does not want to be intubated.  In light of this she was placed on a nonrebreather mask and supportive care.  She was administered Zosyn and vancomycin.    Patient's head CT reveals a left-sided subacute CVA.  Family arrived to the ED I discussed the findings with them at bedside.  I contacted the hospitalist for admission.  Prior to the patient being admitted to the hospital she .    Time of death      Young Blankenship DO  [id number] [phone]

## 2025-05-24 LAB
BACTERIA BLD AEROBE CULT: ABNORMAL
BACTERIA BLD CULT: ABNORMAL
GRAM STN SPEC: ABNORMAL

## 2025-05-25 LAB
BACTERIA BLD AEROBE CULT: ABNORMAL
BACTERIA BLD AEROBE CULT: ABNORMAL
BACTERIA BLD CULT: ABNORMAL
BACTERIA BLD CULT: ABNORMAL
GRAM STN SPEC: ABNORMAL

## 2025-06-04 NOTE — DOCUMENTATION CLARIFICATION NOTE
"    PATIENT:               KOLBY PHAN  ACCT #:                  [id number]  MRN:                       [id number]  :                       3/24/1930  ADMIT DATE:       2025 10:15 PM  DISCH DATE:        2025 3:15 AM  RESPONDING PROVIDER #:        [id number]          PROVIDER RESPONSE TEXT:    Metabolic encephalopathy 2/2 CVA    CDI QUERY TEXT:    Clarification    Instruction:    Based on your assessment of the patient and the clinical information, please provide the requested documentation by clicking on the appropriate radio button and enter any additional information if prompted.    Question: Please further clarify the most likely etiology of the altered mental status as    When answering this query, please exercise your independent professional judgment. The fact that a question is being asked, does not imply that any particular answer is desired or expected.    The patient's clinical indicators include:  Clinical Information:  The patient was a 89 year old female who presented after being found unresponsive with head CT showing a new left MCA infarct.    Clinical Indicators:    ED Note  \"Patient was unresponsive on arrival and put on a NRB with agonal respirations.\"  \"Right pupil 3 nonreactive left pupil 2 nonreactive.\"  \"Unresponsive nonpurposeful movement\"  \"Presents to the emergency department unresponsive coffee-ground emesis tachypnea hypoxia after well check\"  \"    Treatment:    Risk Factors:  Options provided:  -- Metabolic encephalopathy 2/2 CVA  -- Coma 2/2 CVA  -- Other - I will add my own diagnosis  -- Refer to Clinical Documentation Reviewer    Query created by: Beatrice Wooten on 2025 10:33 AM      Electronically signed by:  SHANDRA MARTINEZ DO 2025 12:41 AM          "

## 2025-06-04 NOTE — DOCUMENTATION CLARIFICATION NOTE
"    PATIENT:               KOLBY PHAN  ACCT #:                  [id number]  MRN:                       [id number]  :                       3/24/1930  ADMIT DATE:       2025 10:15 PM  DISCH DATE:        2025 3:15 AM  RESPONDING PROVIDER #:        [id number]          PROVIDER RESPONSE TEXT:    Aspiration PNA    CDI QUERY TEXT:    Clarification    Instruction:    Based on your assessment of the patient and the clinical information, please provide the requested documentation by clicking on the appropriate radio button and enter any additional information if prompted.    Question: Please further specify the type of pneumonia being treated    When answering this query, please exercise your independent professional judgment. The fact that a question is being asked, does not imply that any particular answer is desired or expected.    The patient's clinical indicators include:  Clinical Information:  The patient presented after being found unresponsive and head CT showed a new acute left MCA infarct.    Clinical Indicators:    ED Note  \"Patient had a round of emesis just prior to arrival.\"  \"Coarse rhonchi tachypnea\"  \"Chest x-ray showed New right perihilar and basilar airspace disease, suspicious for pneumonia.\"  \"she is tachycardic increased respiratory rate hypoxic concerning for aspiration.\"    Treatment:  IV Zosyn, IV Vancomycin ordered but not administered, NRB    Risk Factors:  Stroke with coffee-ground emesis prior to arrival  Options provided:  -- Aspiration PNA  -- Gram Negative PNA  -- Other - I will add my own diagnosis  -- Refer to Clinical Documentation Reviewer    Query created by: Beatrice Wooten on 2025 10:27 AM      Electronically signed by:  SHANDRA MARTINEZ DO 2025 12:41 AM          "

## (undated) DEVICE — SUTURE, V-LOC, 4-0, 12 IN, P-12, 90 ABS

## (undated) DEVICE — Device

## (undated) DEVICE — PAD, ELECTRODE DEFIB PADPRO ADULT STRL W/ADAPTER

## (undated) DEVICE — DRESSING, MEPILEX BORDER, POST-OP AG, 4 X 6 IN

## (undated) DEVICE — COVER, EQUIPMENT, ZERO GRAVITY

## (undated) DEVICE — SUTURE, V-LOC, 3.0, 9 IN, CV-20, 90ABS UNDYED

## (undated) DEVICE — VEIN PICK, ACC SUP, 6541

## (undated) DEVICE — ENVELOPE, ANTIBACTERIAL, AIGIS RX TYRX, ABSORBABLE, MED

## (undated) DEVICE — GUIDEWIRE, ANGLE TIP,  .035 DIA, 180 CM, 3 CM TIP"

## (undated) DEVICE — INTRODUCER SYSTEM, PRELUDE SNAP, SPLITTABLE, HEMOSTATIC, 7FR